# Patient Record
Sex: FEMALE | Race: WHITE | NOT HISPANIC OR LATINO | URBAN - METROPOLITAN AREA
[De-identification: names, ages, dates, MRNs, and addresses within clinical notes are randomized per-mention and may not be internally consistent; named-entity substitution may affect disease eponyms.]

---

## 2017-07-03 ENCOUNTER — EMERGENCY (EMERGENCY)
Facility: HOSPITAL | Age: 57
LOS: 0 days | Discharge: HOME | End: 2017-07-03
Attending: EMERGENCY MEDICINE
Payer: MEDICAID

## 2017-07-03 VITALS
RESPIRATION RATE: 18 BRPM | DIASTOLIC BLOOD PRESSURE: 86 MMHG | SYSTOLIC BLOOD PRESSURE: 136 MMHG | TEMPERATURE: 98 F | OXYGEN SATURATION: 99 % | HEART RATE: 90 BPM

## 2017-07-03 VITALS
WEIGHT: 162.92 LBS | OXYGEN SATURATION: 97 % | SYSTOLIC BLOOD PRESSURE: 131 MMHG | TEMPERATURE: 98 F | HEART RATE: 87 BPM | HEIGHT: 63 IN | RESPIRATION RATE: 16 BRPM

## 2017-07-03 PROCEDURE — 70486 CT MAXILLOFACIAL W/O DYE: CPT | Mod: 26

## 2017-07-03 PROCEDURE — 70450 CT HEAD/BRAIN W/O DYE: CPT | Mod: 26

## 2017-07-03 PROCEDURE — 99284 EMERGENCY DEPT VISIT MOD MDM: CPT

## 2017-07-03 PROCEDURE — 76377 3D RENDER W/INTRP POSTPROCES: CPT | Mod: 26

## 2017-07-03 NOTE — ED ADULT NURSE NOTE - OBJECTIVE STATEMENT
Pt states foot went into pothole causing her to fall. pt denies any dizziness or chest pain prior to falling. Pt denies any loc with fall.

## 2017-07-03 NOTE — ED PROVIDER NOTE - PHYSICAL EXAMINATION
GEN: Alert, NAD  HEAD: atraumatic, EOMI, PERRL, normal lids/conjunctiva  ENT: normal hearing, patent oropharynx without erythema/exudate, uvula midline  NECK: +supple, no tenderness/meningismus, +Trachea midline  PULM: Bilateral BS, normal resp effort, no wheeze/stridor/retractions  CV: RRR, no M/R/G, +dist ext pulses  ABD: soft, NT/ND  BACK: no CVAT, no midline tenderness  MSK: no edema/erythema/cyanosis  SKIN: no rash, abrasions R forehead and L forearm/elbow  NEURO: AAOx3, no sensory/motor deficits, CN 2-12 intact
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
## 2017-07-04 DIAGNOSIS — S09.90XA UNSPECIFIED INJURY OF HEAD, INITIAL ENCOUNTER: ICD-10-CM
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
## 2018-08-08 NOTE — ED PROVIDER NOTE - PRESENTING SYMPTOMS DETAILS
56F w madina aneurysm s/p clip comes in a a head inj after tripping on the sidewalk. hit her forehead, no loc, has abrasions on the forehead and L arm, no neckpain/weakness/numbness  ROS: No fever/chills, No photophobia/eye pain/changes in vision, No ear pain/sore throat/dysphagia, No chest pain/palpitations, no SOB/cough/wheeze/stridor, No abdominal pain/N/V/D, no dysuria/frequency/discharge, No neck/back pain, no rash, no changes in neurological status/function.
Warm

## 2019-03-12 NOTE — ED ADULT TRIAGE NOTE - CCCP TRG CHIEF CMPLNT
Spoke to patient and relayed information to her from Lesly Cheng NP.    Patient stated that she is not breastfeeding.    At this time, she feels that she is on the fence regarding what direction to take in terms of scheduling a EGD and/or taking a PPI.    Patient stated that she would appreciate talking to Lesly Cheng to clarify if she has ulcers.\" If Lesly feels that I have ulcers, I will try the medication\".    Patient also stated:  \"If Lesly is concerned of something else or think there is something more serious, I will do the EGD.\"    Patient seems to be very interested in knowing if she has ulcers and to proceed from there.    Will forward to Lesly Cheng's office to call and advise.   head trauma

## 2019-04-24 ENCOUNTER — APPOINTMENT (OUTPATIENT)
Dept: PHYSICAL MEDICINE AND REHAB | Facility: CLINIC | Age: 59
End: 2019-04-24

## 2019-08-15 ENCOUNTER — APPOINTMENT (OUTPATIENT)
Dept: INTERNAL MEDICINE | Facility: CLINIC | Age: 59
End: 2019-08-15
Payer: MEDICARE

## 2019-08-15 VITALS
SYSTOLIC BLOOD PRESSURE: 110 MMHG | DIASTOLIC BLOOD PRESSURE: 78 MMHG | HEART RATE: 64 BPM | BODY MASS INDEX: 22.02 KG/M2 | HEIGHT: 66 IN | WEIGHT: 137 LBS

## 2019-08-15 DIAGNOSIS — G43.909 MIGRAINE, UNSPECIFIED, NOT INTRACTABLE, W/OUT STATUS MIGRAINOSUS: ICD-10-CM

## 2019-08-15 DIAGNOSIS — G89.29 DORSALGIA, UNSPECIFIED: ICD-10-CM

## 2019-08-15 DIAGNOSIS — J45.998 OTHER ASTHMA: ICD-10-CM

## 2019-08-15 DIAGNOSIS — M54.9 DORSALGIA, UNSPECIFIED: ICD-10-CM

## 2019-08-15 PROCEDURE — 99205 OFFICE O/P NEW HI 60 MIN: CPT

## 2019-08-15 RX ORDER — SUMATRIPTAN 20 MG/1
20 SPRAY NASAL
Refills: 0 | Status: ACTIVE | COMMUNITY

## 2019-08-15 RX ORDER — TRAZODONE HYDROCHLORIDE 50 MG/1
50 TABLET ORAL
Refills: 0 | Status: ACTIVE | COMMUNITY

## 2019-08-15 RX ORDER — OXYCODONE HYDROCHLORIDE 30 MG/1
30 TABLET ORAL
Refills: 0 | Status: ACTIVE | COMMUNITY

## 2019-08-15 RX ORDER — CLONAZEPAM 1 MG/1
1 TABLET ORAL
Refills: 0 | Status: ACTIVE | COMMUNITY

## 2019-08-15 RX ORDER — TIZANIDINE HYDROCHLORIDE 4 MG/1
4 CAPSULE ORAL
Refills: 0 | Status: ACTIVE | COMMUNITY

## 2019-08-15 RX ORDER — CELECOXIB 50 MG/1
CAPSULE ORAL
Refills: 0 | Status: ACTIVE | COMMUNITY

## 2019-08-15 RX ORDER — MONTELUKAST SODIUM 10 MG/1
10 TABLET, FILM COATED ORAL
Refills: 0 | Status: ACTIVE | COMMUNITY

## 2019-08-15 RX ORDER — LEVOTHYROXINE SODIUM 175 UG/1
175 TABLET ORAL
Refills: 0 | Status: ACTIVE | COMMUNITY

## 2019-08-15 NOTE — ASSESSMENT
[FreeTextEntry1] : stop Reglan, restart omeprazole 40mg \par  Gastroparesis likely due to her slew of meds. \par  however constipation is puzzling since it is new and her pain meds are not new. So is this a true change in bowel habits. She had no relief with relistor which should have worked if this was opiod induced constipation\par Colonoscopy full risk and benefits explained\par Upper endoscopy risk and benefits fully explained\par

## 2019-08-15 NOTE — HISTORY OF PRESENT ILLNESS
[FreeTextEntry1] : , PMD  Dr. Phipps Saw Dr. Wilson in March 2019. had problems with constipation. she has a h/o IBS with diarrhea, in past. h/o barretts. She had colonoscopy 7 years .  He put her on relistor, and reglan in march. she stopped relistor, but has continued 10mg of reglan\par  She had an EGD- 3/2019 that showed gastroparesis. . She has been on oxycodone for years . and now has had a true change in bowel movements\par  She has chronic back issues for may years . \par she has early satiety

## 2019-08-15 NOTE — PHYSICAL EXAM
[General Appearance - Alert] : alert [General Appearance - In No Acute Distress] : in no acute distress [PERRL With Normal Accommodation] : pupils were equal in size, round, and reactive to light [Sclera] : the sclera and conjunctiva were normal [Extraocular Movements] : extraocular movements were intact [Outer Ear] : the ears and nose were normal in appearance [Neck Appearance] : the appearance of the neck was normal [Oropharynx] : the oropharynx was normal [Neck Cervical Mass (___cm)] : no neck mass was observed [Jugular Venous Distention Increased] : there was no jugular-venous distention [Thyroid Diffuse Enlargement] : the thyroid was not enlarged [Thyroid Nodule] : there were no palpable thyroid nodules [Auscultation Breath Sounds / Voice Sounds] : lungs were clear to auscultation bilaterally [Heart Sounds] : normal S1 and S2 [Heart Rate And Rhythm] : heart rate was normal and rhythm regular [Heart Sounds Gallop] : no gallops [Murmurs] : no murmurs [Heart Sounds Pericardial Friction Rub] : no pericardial rub [Full Pulse] : the pedal pulses are present [Edema] : there was no peripheral edema [Bowel Sounds] : normal bowel sounds [Breast Appearance] : normal in appearance [Breast Palpation Mass] : no palpable masses [Abdomen Tenderness] : non-tender [Abdomen Soft] : soft [Abdomen Mass (___ Cm)] : no abdominal mass palpated [External Female Genitalia] : normal external genitalia [Cervical Lymph Nodes Enlarged Posterior Bilaterally] : posterior cervical [Urinary Bladder Findings] : the bladder was normal on palpation [Urethral Meatus] : normal urethra [Supraclavicular Lymph Nodes Enlarged Bilaterally] : supraclavicular [Cervical Lymph Nodes Enlarged Anterior Bilaterally] : anterior cervical [Femoral Lymph Nodes Enlarged Bilaterally] : femoral [Axillary Lymph Nodes Enlarged Bilaterally] : axillary [No CVA Tenderness] : no ~M costovertebral angle tenderness [Inguinal Lymph Nodes Enlarged Bilaterally] : inguinal [No Spinal Tenderness] : no spinal tenderness [Nail Clubbing] : no clubbing  or cyanosis of the fingernails [Abnormal Walk] : normal gait [Musculoskeletal - Swelling] : no joint swelling seen [Motor Tone] : muscle strength and tone were normal [Skin Color & Pigmentation] : normal skin color and pigmentation [] : no rash [Skin Turgor] : normal skin turgor [Deep Tendon Reflexes (DTR)] : deep tendon reflexes were 2+ and symmetric [No Focal Deficits] : no focal deficits [Sensation] : the sensory exam was normal to light touch and pinprick [Oriented To Time, Place, And Person] : oriented to person, place, and time [Impaired Insight] : insight and judgment were intact [Affect] : the affect was normal

## 2019-08-15 NOTE — REVIEW OF SYSTEMS
[As Noted in HPI] : as noted in HPI [Constipation] : constipation [Negative] : Endocrine [Heartburn] : no heartburn

## 2019-10-14 ENCOUNTER — RX CHANGE (OUTPATIENT)
Age: 59
End: 2019-10-14

## 2019-10-25 ENCOUNTER — APPOINTMENT (OUTPATIENT)
Dept: INTERNAL MEDICINE | Facility: AMBULATORY MEDICAL SERVICES | Age: 59
End: 2019-10-25
Payer: MEDICARE

## 2019-10-25 PROCEDURE — G0121 COLON CA SCRN NOT HI RSK IND: CPT

## 2019-10-25 PROCEDURE — 43239 EGD BIOPSY SINGLE/MULTIPLE: CPT | Mod: 59

## 2019-11-25 ENCOUNTER — APPOINTMENT (OUTPATIENT)
Dept: INTERNAL MEDICINE | Facility: CLINIC | Age: 59
End: 2019-11-25
Payer: MEDICARE

## 2019-11-25 VITALS — DIASTOLIC BLOOD PRESSURE: 69 MMHG | SYSTOLIC BLOOD PRESSURE: 106 MMHG | HEART RATE: 69 BPM

## 2019-11-25 DIAGNOSIS — K21.9 GASTRO-ESOPHAGEAL REFLUX DISEASE W/OUT ESOPHAGITIS: ICD-10-CM

## 2019-11-25 DIAGNOSIS — T40.2X5A DRUG INDUCED CONSTIPATION: ICD-10-CM

## 2019-11-25 DIAGNOSIS — R19.4 CHANGE IN BOWEL HABIT: ICD-10-CM

## 2019-11-25 DIAGNOSIS — K59.03 DRUG INDUCED CONSTIPATION: ICD-10-CM

## 2019-11-25 DIAGNOSIS — K22.70 BARRETT'S ESOPHAGUS W/OUT DYSPLASIA: ICD-10-CM

## 2019-11-25 DIAGNOSIS — K58.1 IRRITABLE BOWEL SYNDROME WITH CONSTIPATION: ICD-10-CM

## 2019-11-25 PROCEDURE — 99214 OFFICE O/P EST MOD 30 MIN: CPT

## 2019-11-25 RX ORDER — METOCLOPRAMIDE HYDROCHLORIDE 5 MG/5ML
10 SOLUTION ORAL
Refills: 0 | Status: DISCONTINUED | COMMUNITY
End: 2019-11-25

## 2019-11-25 RX ORDER — SODIUM PICOSULFATE, MAGNESIUM OXIDE, AND ANHYDROUS CITRIC ACID 10; 3.5; 12 MG/160ML; G/160ML; G/160ML
10-3.5-12 MG-GM LIQUID ORAL TWICE DAILY
Qty: 1 | Refills: 0 | Status: DISCONTINUED | COMMUNITY
Start: 2019-10-14 | End: 2019-11-25

## 2019-11-25 RX ORDER — LORATADINE 5 MG
17 TABLET,CHEWABLE ORAL
Refills: 0 | Status: DISCONTINUED | COMMUNITY
End: 2019-11-25

## 2019-11-25 RX ORDER — OMEPRAZOLE 40 MG/1
40 CAPSULE, DELAYED RELEASE ORAL
Qty: 90 | Refills: 3 | Status: ACTIVE | COMMUNITY
Start: 1900-01-01 | End: 1900-01-01

## 2019-11-25 RX ORDER — POLYETHYLENE GLYCOL 3350, SODIUM SULFATE ANHYDROUS, SODIUM BICARBONATE, SODIUM CHLORIDE, POTASSIUM CHLORIDE 227.1; 21.5; 6.36; 5.53; .754 G/L; G/L; G/L; G/L; G/L
227.1 POWDER, FOR SOLUTION ORAL
Qty: 1 | Refills: 0 | Status: DISCONTINUED | COMMUNITY
Start: 2019-10-14 | End: 2019-11-25

## 2019-11-25 NOTE — PHYSICAL EXAM
[General Appearance - In No Acute Distress] : in no acute distress [General Appearance - Alert] : alert [Sclera] : the sclera and conjunctiva were normal [PERRL With Normal Accommodation] : pupils were equal in size, round, and reactive to light [Extraocular Movements] : extraocular movements were intact [Outer Ear] : the ears and nose were normal in appearance [Oropharynx] : the oropharynx was normal [Neck Cervical Mass (___cm)] : no neck mass was observed [Neck Appearance] : the appearance of the neck was normal [Jugular Venous Distention Increased] : there was no jugular-venous distention [Thyroid Diffuse Enlargement] : the thyroid was not enlarged [Thyroid Nodule] : there were no palpable thyroid nodules [Auscultation Breath Sounds / Voice Sounds] : lungs were clear to auscultation bilaterally [Heart Rate And Rhythm] : heart rate was normal and rhythm regular [Heart Sounds Gallop] : no gallops [Heart Sounds] : normal S1 and S2 [Murmurs] : no murmurs [Heart Sounds Pericardial Friction Rub] : no pericardial rub [Full Pulse] : the pedal pulses are present [Edema] : there was no peripheral edema [Breast Appearance] : normal in appearance [Bowel Sounds] : normal bowel sounds [Breast Palpation Mass] : no palpable masses [Abdomen Soft] : soft [] : no hepato-splenomegaly [Abdomen Mass (___ Cm)] : no abdominal mass palpated [Abdomen Tenderness] : non-tender [Urethral Meatus] : normal urethra [External Female Genitalia] : normal external genitalia [Urinary Bladder Findings] : the bladder was normal on palpation [No CVA Tenderness] : no ~M costovertebral angle tenderness [No Spinal Tenderness] : no spinal tenderness [Abnormal Walk] : normal gait [Musculoskeletal - Swelling] : no joint swelling seen [Nail Clubbing] : no clubbing  or cyanosis of the fingernails [Impaired Insight] : insight and judgment were intact [Oriented To Time, Place, And Person] : oriented to person, place, and time [Motor Tone] : muscle strength and tone were normal [Affect] : the affect was normal

## 2020-11-24 ENCOUNTER — APPOINTMENT (OUTPATIENT)
Dept: INTERNAL MEDICINE | Facility: CLINIC | Age: 60
End: 2020-11-24

## 2020-12-21 RX ORDER — NALOXEGOL OXALATE 25 MG/1
25 TABLET, FILM COATED ORAL
Qty: 30 | Refills: 0 | Status: ACTIVE | COMMUNITY
Start: 2019-10-25 | End: 1900-01-01

## 2021-04-21 NOTE — ED ADULT NURSE NOTE - CAS EDN INTEG ASSESS
[FreeTextEntry1] : 56 year old female here for follow up of possible scleroderma.\par \par 1. Possible limited systemic sclerosis\par \par -Raynaud's, positive centromere antibody, ?telangiectasias total 8 points (need 9 for definite systemic sclerosis classification)\par -Patient has GERD but episodic depending on what she eats\par -TTE done and normal\par -Overall doing well \par -Will prescribe topical nitroglycerin ointment for Raynaud's. Patient does not want to take PO mediation (amlodipine)\par \par 2. Positive anticardiolipin IgM 39\par \par -No history of VTE or pregnancy morbidity\par -Possible false positive will recheck today\par \par 3. Positive TAMMI\par \par -No symptoms of SLE, likely from SSc possibly. Check dsDNA, C3, C4, CBC, CMP\par \par 
- - -

## 2021-12-07 ENCOUNTER — INPATIENT (INPATIENT)
Facility: HOSPITAL | Age: 61
LOS: 1 days | Discharge: HOME | End: 2021-12-09
Attending: INTERNAL MEDICINE | Admitting: INTERNAL MEDICINE
Payer: MEDICARE

## 2021-12-07 VITALS
WEIGHT: 293 LBS | RESPIRATION RATE: 18 BRPM | DIASTOLIC BLOOD PRESSURE: 114 MMHG | HEART RATE: 62 BPM | TEMPERATURE: 99 F | OXYGEN SATURATION: 96 % | HEIGHT: 63 IN | SYSTOLIC BLOOD PRESSURE: 147 MMHG

## 2021-12-07 DIAGNOSIS — K21.9 GASTRO-ESOPHAGEAL REFLUX DISEASE WITHOUT ESOPHAGITIS: ICD-10-CM

## 2021-12-07 DIAGNOSIS — F31.9 BIPOLAR DISORDER, UNSPECIFIED: ICD-10-CM

## 2021-12-07 DIAGNOSIS — Z79.899 OTHER LONG TERM (CURRENT) DRUG THERAPY: ICD-10-CM

## 2021-12-07 DIAGNOSIS — R41.82 ALTERED MENTAL STATUS, UNSPECIFIED: ICD-10-CM

## 2021-12-07 DIAGNOSIS — E03.9 HYPOTHYROIDISM, UNSPECIFIED: ICD-10-CM

## 2021-12-07 DIAGNOSIS — M54.50 LOW BACK PAIN, UNSPECIFIED: ICD-10-CM

## 2021-12-07 DIAGNOSIS — R00.1 BRADYCARDIA, UNSPECIFIED: ICD-10-CM
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
## 2021-12-07 DIAGNOSIS — F06.30 MOOD DISORDER DUE TO KNOWN PHYSIOLOGICAL CONDITION, UNSPECIFIED: ICD-10-CM

## 2021-12-07 DIAGNOSIS — Z79.890 HORMONE REPLACEMENT THERAPY: ICD-10-CM

## 2021-12-07 DIAGNOSIS — R47.01 APHASIA: ICD-10-CM

## 2021-12-07 DIAGNOSIS — M54.9 DORSALGIA, UNSPECIFIED: ICD-10-CM

## 2021-12-07 DIAGNOSIS — G89.29 OTHER CHRONIC PAIN: ICD-10-CM

## 2021-12-07 DIAGNOSIS — I44.0 ATRIOVENTRICULAR BLOCK, FIRST DEGREE: ICD-10-CM

## 2021-12-07 DIAGNOSIS — T40.601A POISONING BY UNSPECIFIED NARCOTICS, ACCIDENTAL (UNINTENTIONAL), INITIAL ENCOUNTER: ICD-10-CM

## 2021-12-07 DIAGNOSIS — Z79.1 LONG TERM (CURRENT) USE OF NON-STEROIDAL ANTI-INFLAMMATORIES (NSAID): ICD-10-CM

## 2021-12-07 DIAGNOSIS — E78.5 HYPERLIPIDEMIA, UNSPECIFIED: ICD-10-CM

## 2021-12-07 DIAGNOSIS — G92.8 OTHER TOXIC ENCEPHALOPATHY: ICD-10-CM

## 2021-12-07 DIAGNOSIS — M54.10 RADICULOPATHY, SITE UNSPECIFIED: ICD-10-CM

## 2021-12-07 LAB
ALBUMIN SERPL ELPH-MCNC: 3.7 G/DL — SIGNIFICANT CHANGE UP (ref 3.3–5)
ALP SERPL-CCNC: 67 U/L — SIGNIFICANT CHANGE UP (ref 40–120)
ALT FLD-CCNC: 18 U/L — SIGNIFICANT CHANGE UP (ref 12–78)
AMMONIA BLD-MCNC: 17 UMOL/L — SIGNIFICANT CHANGE UP (ref 11–32)
AMPHET UR-MCNC: NEGATIVE — SIGNIFICANT CHANGE UP
ANION GAP SERPL CALC-SCNC: 8 MMOL/L — SIGNIFICANT CHANGE UP (ref 5–17)
APAP SERPL-MCNC: < 2 UG/ML (ref 10–30)
APTT BLD: 44.5 SEC — HIGH (ref 27.5–35.5)
AST SERPL-CCNC: 32 U/L — SIGNIFICANT CHANGE UP (ref 15–37)
BARBITURATES UR SCN-MCNC: NEGATIVE — SIGNIFICANT CHANGE UP
BASOPHILS # BLD AUTO: 0.04 K/UL — SIGNIFICANT CHANGE UP (ref 0–0.2)
BASOPHILS NFR BLD AUTO: 0.8 % — SIGNIFICANT CHANGE UP (ref 0–2)
BENZODIAZ UR-MCNC: POSITIVE — SIGNIFICANT CHANGE UP
BILIRUB SERPL-MCNC: 0.8 MG/DL — SIGNIFICANT CHANGE UP (ref 0.2–1.2)
BUN SERPL-MCNC: 17 MG/DL — SIGNIFICANT CHANGE UP (ref 7–23)
CALCIUM SERPL-MCNC: 8.7 MG/DL — SIGNIFICANT CHANGE UP (ref 8.5–10.1)
CHLORIDE SERPL-SCNC: 106 MMOL/L — SIGNIFICANT CHANGE UP (ref 96–108)
CO2 SERPL-SCNC: 23 MMOL/L — SIGNIFICANT CHANGE UP (ref 22–31)
COCAINE METAB.OTHER UR-MCNC: NEGATIVE — SIGNIFICANT CHANGE UP
CREAT SERPL-MCNC: 0.92 MG/DL — SIGNIFICANT CHANGE UP (ref 0.5–1.3)
EOSINOPHIL # BLD AUTO: 0.01 K/UL — SIGNIFICANT CHANGE UP (ref 0–0.5)
EOSINOPHIL NFR BLD AUTO: 0.2 % — SIGNIFICANT CHANGE UP (ref 0–6)
ETHANOL SERPL-MCNC: <10 MG/DL — SIGNIFICANT CHANGE UP (ref 0–10)
FLUAV AG NPH QL: SIGNIFICANT CHANGE UP
FLUBV AG NPH QL: SIGNIFICANT CHANGE UP
FOLATE SERPL-MCNC: 6.8 NG/ML — SIGNIFICANT CHANGE UP
GLUCOSE BLDC GLUCOMTR-MCNC: 88 MG/DL — SIGNIFICANT CHANGE UP (ref 70–99)
GLUCOSE SERPL-MCNC: 102 MG/DL — HIGH (ref 70–99)
HCT VFR BLD CALC: 39.5 % — SIGNIFICANT CHANGE UP (ref 34.5–45)
HGB BLD-MCNC: 13.3 G/DL — SIGNIFICANT CHANGE UP (ref 11.5–15.5)
IMM GRANULOCYTES NFR BLD AUTO: 0.4 % — SIGNIFICANT CHANGE UP (ref 0–1.5)
INR BLD: 1.07 RATIO — SIGNIFICANT CHANGE UP (ref 0.88–1.16)
LACTATE SERPL-SCNC: 1.2 MMOL/L — SIGNIFICANT CHANGE UP (ref 0.7–2)
LYMPHOCYTES # BLD AUTO: 1.05 K/UL — SIGNIFICANT CHANGE UP (ref 1–3.3)
LYMPHOCYTES # BLD AUTO: 22.2 % — SIGNIFICANT CHANGE UP (ref 13–44)
MCHC RBC-ENTMCNC: 30.7 PG — SIGNIFICANT CHANGE UP (ref 27–34)
MCHC RBC-ENTMCNC: 33.7 GM/DL — SIGNIFICANT CHANGE UP (ref 32–36)
MCV RBC AUTO: 91.2 FL — SIGNIFICANT CHANGE UP (ref 80–100)
METHADONE UR-MCNC: NEGATIVE — SIGNIFICANT CHANGE UP
MONOCYTES # BLD AUTO: 0.29 K/UL — SIGNIFICANT CHANGE UP (ref 0–0.9)
MONOCYTES NFR BLD AUTO: 6.1 % — SIGNIFICANT CHANGE UP (ref 2–14)
NEUTROPHILS # BLD AUTO: 3.31 K/UL — SIGNIFICANT CHANGE UP (ref 1.8–7.4)
NEUTROPHILS NFR BLD AUTO: 70.3 % — SIGNIFICANT CHANGE UP (ref 43–77)
NRBC # BLD: 0 /100 WBCS — SIGNIFICANT CHANGE UP (ref 0–0)
OPIATES UR-MCNC: NEGATIVE — SIGNIFICANT CHANGE UP
PCP SPEC-MCNC: SIGNIFICANT CHANGE UP
PCP UR-MCNC: NEGATIVE — SIGNIFICANT CHANGE UP
PLATELET # BLD AUTO: 185 K/UL — SIGNIFICANT CHANGE UP (ref 150–400)
POTASSIUM SERPL-MCNC: 4.5 MMOL/L — SIGNIFICANT CHANGE UP (ref 3.5–5.3)
POTASSIUM SERPL-SCNC: 4.5 MMOL/L — SIGNIFICANT CHANGE UP (ref 3.5–5.3)
PROT SERPL-MCNC: 7.4 GM/DL — SIGNIFICANT CHANGE UP (ref 6–8.3)
PROTHROM AB SERPL-ACNC: 12.4 SEC — SIGNIFICANT CHANGE UP (ref 10.6–13.6)
RBC # BLD: 4.33 M/UL — SIGNIFICANT CHANGE UP (ref 3.8–5.2)
RBC # FLD: 12.4 % — SIGNIFICANT CHANGE UP (ref 10.3–14.5)
SALICYLATES SERPL-MCNC: <1.7 MG/DL — LOW (ref 2.8–20)
SARS-COV-2 RNA SPEC QL NAA+PROBE: SIGNIFICANT CHANGE UP
SODIUM SERPL-SCNC: 137 MMOL/L — SIGNIFICANT CHANGE UP (ref 135–145)
T PALLIDUM AB TITR SER: NEGATIVE — SIGNIFICANT CHANGE UP
T3 SERPL-MCNC: 61 NG/DL — LOW (ref 80–200)
T4 FREE SERPL-MCNC: 1.3 NG/DL — SIGNIFICANT CHANGE UP (ref 0.9–1.8)
THC UR QL: POSITIVE — SIGNIFICANT CHANGE UP
TROPONIN I, HIGH SENSITIVITY RESULT: 6.8 NG/L — SIGNIFICANT CHANGE UP
TSH SERPL-MCNC: 8.39 UU/ML — HIGH (ref 0.36–3.74)
VIT B12 SERPL-MCNC: 466 PG/ML — SIGNIFICANT CHANGE UP (ref 232–1245)
WBC # BLD: 4.72 K/UL — SIGNIFICANT CHANGE UP (ref 3.8–10.5)
WBC # FLD AUTO: 4.72 K/UL — SIGNIFICANT CHANGE UP (ref 3.8–10.5)

## 2021-12-07 PROCEDURE — 0042T: CPT

## 2021-12-07 PROCEDURE — 95816 EEG AWAKE AND DROWSY: CPT | Mod: 26

## 2021-12-07 PROCEDURE — 99232 SBSQ HOSP IP/OBS MODERATE 35: CPT

## 2021-12-07 PROCEDURE — 70496 CT ANGIOGRAPHY HEAD: CPT | Mod: 26,MA

## 2021-12-07 PROCEDURE — 90792 PSYCH DIAG EVAL W/MED SRVCS: CPT

## 2021-12-07 PROCEDURE — 99285 EMERGENCY DEPT VISIT HI MDM: CPT

## 2021-12-07 PROCEDURE — 99223 1ST HOSP IP/OBS HIGH 75: CPT

## 2021-12-07 PROCEDURE — 93010 ELECTROCARDIOGRAM REPORT: CPT

## 2021-12-07 PROCEDURE — 70498 CT ANGIOGRAPHY NECK: CPT | Mod: 26,MA

## 2021-12-07 RX ORDER — DEXTROSE 50 % IN WATER 50 %
25 SYRINGE (ML) INTRAVENOUS ONCE
Refills: 0 | Status: DISCONTINUED | OUTPATIENT
Start: 2021-12-07 | End: 2021-12-09

## 2021-12-07 RX ORDER — ENOXAPARIN SODIUM 100 MG/ML
40 INJECTION SUBCUTANEOUS DAILY
Refills: 0 | Status: DISCONTINUED | OUTPATIENT
Start: 2021-12-07 | End: 2021-12-09

## 2021-12-07 RX ORDER — ETOMIDATE 2 MG/ML
5 INJECTION INTRAVENOUS ONCE
Refills: 0 | Status: DISCONTINUED | OUTPATIENT
Start: 2021-12-07 | End: 2021-12-07

## 2021-12-07 RX ORDER — LANOLIN ALCOHOL/MO/W.PET/CERES
3 CREAM (GRAM) TOPICAL AT BEDTIME
Refills: 0 | Status: DISCONTINUED | OUTPATIENT
Start: 2021-12-07 | End: 2021-12-09

## 2021-12-07 RX ORDER — ETOMIDATE 2 MG/ML
10 INJECTION INTRAVENOUS ONCE
Refills: 0 | Status: COMPLETED | OUTPATIENT
Start: 2021-12-07 | End: 2021-12-07

## 2021-12-07 RX ORDER — ATORVASTATIN CALCIUM 80 MG/1
40 TABLET, FILM COATED ORAL AT BEDTIME
Refills: 0 | Status: DISCONTINUED | OUTPATIENT
Start: 2021-12-07 | End: 2021-12-09

## 2021-12-07 RX ORDER — PANTOPRAZOLE SODIUM 20 MG/1
40 TABLET, DELAYED RELEASE ORAL
Refills: 0 | Status: DISCONTINUED | OUTPATIENT
Start: 2021-12-07 | End: 2021-12-09

## 2021-12-07 RX ORDER — DEXTROSE 50 % IN WATER 50 %
12.5 SYRINGE (ML) INTRAVENOUS ONCE
Refills: 0 | Status: DISCONTINUED | OUTPATIENT
Start: 2021-12-07 | End: 2021-12-09

## 2021-12-07 RX ORDER — MIDAZOLAM HYDROCHLORIDE 1 MG/ML
2 INJECTION, SOLUTION INTRAMUSCULAR; INTRAVENOUS ONCE
Refills: 0 | Status: DISCONTINUED | OUTPATIENT
Start: 2021-12-07 | End: 2021-12-07

## 2021-12-07 RX ORDER — HALOPERIDOL DECANOATE 100 MG/ML
5 INJECTION INTRAMUSCULAR ONCE
Refills: 0 | Status: COMPLETED | OUTPATIENT
Start: 2021-12-07 | End: 2021-12-07

## 2021-12-07 RX ORDER — OXYCODONE HYDROCHLORIDE 5 MG/1
30 TABLET ORAL EVERY 6 HOURS
Refills: 0 | Status: DISCONTINUED | OUTPATIENT
Start: 2021-12-07 | End: 2021-12-09

## 2021-12-07 RX ORDER — ASPIRIN/CALCIUM CARB/MAGNESIUM 324 MG
81 TABLET ORAL DAILY
Refills: 0 | Status: DISCONTINUED | OUTPATIENT
Start: 2021-12-07 | End: 2021-12-09

## 2021-12-07 RX ORDER — ETOMIDATE 2 MG/ML
12.5 INJECTION INTRAVENOUS ONCE
Refills: 0 | Status: COMPLETED | OUTPATIENT
Start: 2021-12-07 | End: 2021-12-07

## 2021-12-07 RX ORDER — DEXTROSE 50 % IN WATER 50 %
15 SYRINGE (ML) INTRAVENOUS ONCE
Refills: 0 | Status: DISCONTINUED | OUTPATIENT
Start: 2021-12-07 | End: 2021-12-09

## 2021-12-07 RX ORDER — MIDAZOLAM HYDROCHLORIDE 1 MG/ML
1 INJECTION, SOLUTION INTRAMUSCULAR; INTRAVENOUS ONCE
Refills: 0 | Status: DISCONTINUED | OUTPATIENT
Start: 2021-12-07 | End: 2021-12-07

## 2021-12-07 RX ORDER — GLUCAGON INJECTION, SOLUTION 0.5 MG/.1ML
1 INJECTION, SOLUTION SUBCUTANEOUS ONCE
Refills: 0 | Status: DISCONTINUED | OUTPATIENT
Start: 2021-12-07 | End: 2021-12-09

## 2021-12-07 RX ORDER — ONDANSETRON 8 MG/1
4 TABLET, FILM COATED ORAL EVERY 8 HOURS
Refills: 0 | Status: DISCONTINUED | OUTPATIENT
Start: 2021-12-07 | End: 2021-12-09

## 2021-12-07 RX ORDER — LEVOTHYROXINE SODIUM 125 MCG
75 TABLET ORAL DAILY
Refills: 0 | Status: DISCONTINUED | OUTPATIENT
Start: 2021-12-07 | End: 2021-12-08

## 2021-12-07 RX ADMIN — MIDAZOLAM HYDROCHLORIDE 2 MILLIGRAM(S): 1 INJECTION, SOLUTION INTRAMUSCULAR; INTRAVENOUS at 05:37

## 2021-12-07 RX ADMIN — HALOPERIDOL DECANOATE 5 MILLIGRAM(S): 100 INJECTION INTRAMUSCULAR at 06:57

## 2021-12-07 RX ADMIN — MIDAZOLAM HYDROCHLORIDE 2 MILLIGRAM(S): 1 INJECTION, SOLUTION INTRAMUSCULAR; INTRAVENOUS at 05:44

## 2021-12-07 RX ADMIN — ATORVASTATIN CALCIUM 40 MILLIGRAM(S): 80 TABLET, FILM COATED ORAL at 22:15

## 2021-12-07 RX ADMIN — Medication 81 MILLIGRAM(S): at 13:23

## 2021-12-07 RX ADMIN — ETOMIDATE 10 MILLIGRAM(S): 2 INJECTION INTRAVENOUS at 05:55

## 2021-12-07 RX ADMIN — OXYCODONE HYDROCHLORIDE 30 MILLIGRAM(S): 5 TABLET ORAL at 23:16

## 2021-12-07 RX ADMIN — ETOMIDATE 12.5 MILLIGRAM(S): 2 INJECTION INTRAVENOUS at 06:00

## 2021-12-07 RX ADMIN — ENOXAPARIN SODIUM 40 MILLIGRAM(S): 100 INJECTION SUBCUTANEOUS at 13:23

## 2021-12-07 NOTE — ED ADULT NURSE REASSESSMENT NOTE - NS ED NURSE REASSESS COMMENT FT1
Patient continues to be agitated, continues to kick at staff. MD Foster at bedside. No acute distress noted, patient continues to be on cardiac monitor.

## 2021-12-07 NOTE — PHYSICAL THERAPY INITIAL EVALUATION ADULT - TRANSFER TRAINING, PT EVAL
To be able to perform transfers Independently, including bed to chair, chair to bed and chair to chair In order to facilitate safety with appropriate technique in 2 weeks

## 2021-12-07 NOTE — SWALLOW BEDSIDE ASSESSMENT ADULT - SLP GENERAL OBSERVATIONS
alert and oriented to name and place; maintained awareness of eating/swallow context; followed directions; demonstrates expressive language deficits impacting communication over 50% of time

## 2021-12-07 NOTE — BH CONSULTATION LIAISON ASSESSMENT NOTE - NSBHCONSULTFOLLOWAFTERCARE_PSY_A_CORE FT
has follow up with Dr Gillespie for psychiatric medication management; scheduled for spinal fusion surgery with Dr Larkin here at [facility] January 2022.

## 2021-12-07 NOTE — BH CONSULTATION LIAISON ASSESSMENT NOTE - NSBHCHARTREVIEWLAB_PSY_A_CORE FT
12-07    137  |  106  |  17  ----------------------------<  102<H>  4.5   |  23  |  0.92    Ca    8.7      07 Dec 2021 06:27    TPro  7.4  /  Alb  3.7  /  TBili  0.8  /  DBili  x   /  AST  32  /  ALT  18  /  AlkPhos  67  12-07

## 2021-12-07 NOTE — PHYSICAL THERAPY INITIAL EVALUATION ADULT - ADDITIONAL COMMENTS
as per patient she lives with a friend in a  with 5 steps to enter with 2 HR's (widely spread). inside she has her own room. pt was independent, owns a cane but she does not use it.

## 2021-12-07 NOTE — PHYSICAL THERAPY INITIAL EVALUATION ADULT - DID THE PATIENT HAVE SURGERY?
This is the hx of CONCEPCION. a 60 y/o female patient who was admitted [place] [facility] due to complications of CVA affecting medical condition and with subsequent affection on functional mobility./n/a

## 2021-12-07 NOTE — BH CONSULTATION LIAISON ASSESSMENT NOTE - CURRENT MEDICATION
MEDICATIONS  (STANDING):  aspirin  chewable 81 milliGRAM(s) Oral daily  atorvastatin 40 milliGRAM(s) Oral at bedtime  dextrose 40% Gel 15 Gram(s) Oral once  dextrose 50% Injectable 25 Gram(s) IV Push once  dextrose 50% Injectable 12.5 Gram(s) IV Push once  dextrose 50% Injectable 25 Gram(s) IV Push once  enoxaparin Injectable 40 milliGRAM(s) SubCutaneous daily  glucagon  Injectable 1 milliGRAM(s) IntraMuscular once  levothyroxine 75 MICROGram(s) Oral daily  pantoprazole    Tablet 40 milliGRAM(s) Oral before breakfast    MEDICATIONS  (PRN):  melatonin 3 milliGRAM(s) Oral at bedtime PRN Insomnia  ondansetron Injectable 4 milliGRAM(s) IV Push every 8 hours PRN Nausea and/or Vomiting

## 2021-12-07 NOTE — SWALLOW BEDSIDE ASSESSMENT ADULT - COMMENTS
PMH hypothyroidism, bipolar disorder, chronic low back pain (scheduled for surgery  with Dr Larkin in Jan/2022), h/o brain aneurysm ( s/p clipping in her 20s), GERD;   12/7  note Depressive disorder due to separate medical condition pt denied any swallowing difficulties PMH hypothyroidism, bipolar disorder, chronic low back pain (scheduled for surgery  with Dr Larkin in Jan/2022), h/o brain aneurysm ( s/p clipping in her 20s), GERD;   12/7  note Depressive disorder due to separate medical condition  12/7 CT brain Motion limited study. No gross evidence of acute intracranial hemorrhage, midline shift or CT evidence of acute territorial infarct.

## 2021-12-07 NOTE — PHYSICAL THERAPY INITIAL EVALUATION ADULT - GENERAL OBSERVATIONS, REHAB EVAL
Pt was encountered semi-supine in bed; NAD, + telemetry +, primafit +, AXOX4, coherent, comprehension intact, cooperative, c/o pain on back 8/10.

## 2021-12-07 NOTE — PHYSICAL THERAPY INITIAL EVALUATION ADULT - GAIT TRAINING, PT EVAL
To be able to perform ambulation independently using no device for 500 feet, using proper technique using AD, with proper posture and functional distance at home in 2 weeks.

## 2021-12-07 NOTE — ED ADULT NURSE REASSESSMENT NOTE - NS ED NURSE REASSESS COMMENT FT1
Received pt this morning sleeping and arousable to touch- Friend at the bedside stated that she lives next door to the pt and she heard banging on the bathroom wall- she went to check on her and saw that the pt was on the floor and saying "no, no" pt was unable to verbalize needs at the time-EMS was called and pt came in for Stroke work up. Pt medicated for purpose of getting EKG and CT-Scan completed during the prior shift. Pt in no acute distress-pending admission.

## 2021-12-07 NOTE — SPEECH LANGUAGE PATHOLOGY EVALUATION - SLP GENERAL OBSERVATIONS
alert and oriented to name and place; maintained interactions and cooperative for exam; followed directions and demonstrated impaired verbal communication

## 2021-12-07 NOTE — PHYSICAL THERAPY INITIAL EVALUATION ADULT - BALANCE TRAINING, PT EVAL
Pt will improve static/dynamic standing balance to WFL to perform all functional mobility without LOB and increase safety, by 2 weeks

## 2021-12-07 NOTE — BH CONSULTATION LIAISON ASSESSMENT NOTE - SUMMARY
61 female with hx of Mood Disorder, suspected Borderline character traits, hx of SIB, 1 prior psychiatric admission several years ago for cutting with concurrent passive suicidal ideation; no hx of high lethality attempts; currently seeing psychiatrist Dr Gillespie for monthly medication management (trazodone and Cymbalta), denies substance abuse, reports to be medication compliant, has been psychiatrically stable for years); + with Chronic Pain not optimally managed & not seeing a Pain Management Specialist (PMD RX Oxycodone, Xanaflex), scheduled for Spine surgery (fusion) with Dr Larkin 01/22 at , admitted with presenting for AMS preceded by days of increased chronic back pain resulting in inability to get out of bed / ambulate. History and collateral most consistent with unintentional overuse of analgesic in order to combat worsened pain resulting in AMS. Patient still has residual memory/recall deficits on 12/7/21 which should clear by 12/8/21

## 2021-12-07 NOTE — ED PROVIDER NOTE - PROGRESS NOTE DETAILS
patinet altered in CT preventing time sensitive medical workup, sequential versed doses given without imrpvoement. etomidate given with good success. airway status monitored throughout with complication. patinet now awake and moving all extremities.

## 2021-12-07 NOTE — H&P ADULT - HISTORY OF PRESENT ILLNESS
Talked to Holly Mcadams ( [phone]), patient's neighbor for 50 years, who know patient medical history very well.  61 years old female with h/o hypothyroidism, bipolar disorder, chronic low back pain (scheduled for surgery  with Dr Larkin in Jan/2022), h/o brain aneurysm ( s/p clipping in her 20s), GERD present to ED with AMS. Last known normal was last night 9:30 PM. Today at around 4:30AM, neighbor heard banging noise coming from patient's apartment. Patient was seen banging the radiatory cover and appear altered. The only thing patient would response to the neighbor was " no...no" and did not recognizer her. Per neighbor, patient was admitted to a psychiatric hospital several years ago for cutting herself, has since then stable and is following with psych outpatient.   Was altered in ED. Received benzo, etomidate and haldol in ED for CT scan. EKG with juncitional marcelino, VR 49, QTc 431. No leukocytosis, Hb 13.3, Plt 185, Na 137, K 4.5, Cr 0.92, glucose 102, lactate 1.2, ammonia 17, hsTnT 6.8, ETOH < 10. CT head with motion limited study but no gross evidence of acute intracranial hemorrhage or infarct. CTA head/neck with patent intracranial circulation. Right supraclinoid aneurysm clip without evidence of recannulization. No intracranial aneurysm. Patent, ECAs, ICAs, no  hemodynamically significant stenosis at  ICA origins by NASCET criteria. Bilateral vertebral arteries are patent without flow limiting stenosisCT perfusion show areas of elevated Tmax do not follow a vascular distribution and are likely artifactual in nature. ED consulted neurology    SH: drink a glass of wine occasionally  FH: Mother has breast cancer

## 2021-12-07 NOTE — BH CONSULTATION LIAISON ASSESSMENT NOTE - NSBHCONSULTRECOMMENDOTHER_PSY_A_CORE FT
HOLD all psychiatric medications for today; Patient needs a "wash out"; MSE should be back to baseline by tomorrow if she continues this trajectory of improvement  - drank juice today with no issues as per RN; speech normal; DIET changed to full liquid / advance as tolerated

## 2021-12-07 NOTE — SWALLOW BEDSIDE ASSESSMENT ADULT - SWALLOW EVAL: RECOMMENDED FEEDING/EATING TECHNIQUES
allow for swallow between intakes/alternate food with liquid/maintain upright posture during/after eating for 30 mins/oral hygiene/small sips/bites

## 2021-12-07 NOTE — PHYSICAL THERAPY INITIAL EVALUATION ADULT - PLANNED THERAPY INTERVENTIONS, PT EVAL
To be able to perform stair negotiation with no device and R hand rails Independently to improve access and exiting from home and access to bedrooms, basement and bathrooms by 2 weeks/balance training/gait training/strengthening/transfer training

## 2021-12-07 NOTE — H&P ADULT - NSHPPHYSICALEXAM_GEN_ALL_CORE
CONSTITUTIONAL: Well developed, altered and sedated, no acute distress. BP-135/80 , HR- 45 , RR- 16  EYES: PERRL, no scleral icterus  NECK: Neck supple, trachea midline, non-tender, no masses or thyromegaly.  CARDIAC: Normal S1 and S2. Regular rate and rhythms. No murmurs. No Pedal edema. Peripheral pulses intact  LUNGS: Clear to auscultation, equal air entry both lungs. No rales, rhonchi, wheezing. Normal respiratory effort.   ABDOMEN: Soft, nondistended, nontender. No guarding or rebound tenderness. No hepatomegaly or splenomegaly.   MUSCULOSKELETAL: Normocephalic, atraumatic. No clubbing or cyanosis. No significant deformity or joint abnormality  NEUROLOGICAL: AMS, sedated and unable to reliable assess but seems to move all extremities  SKIN: no lesions or eruptions. Normal turgor  PSYCHIATRIC: A&O x 0, altered and sedated

## 2021-12-07 NOTE — PHYSICAL THERAPY INITIAL EVALUATION ADULT - ASR EQUIP NEEDS DISCH PT EVAL
Mint Green, Lavender, Blue, Yellow/Orange blood tubes collected and sent to lab.    Speckled top and Grey top urine tubes collected and sent to lab       DARREN Shipley  12/29/20 3950 Pt owns a cane

## 2021-12-07 NOTE — BH CONSULTATION LIAISON ASSESSMENT NOTE - HPI (INCLUDE ILLNESS QUALITY, SEVERITY, DURATION, TIMING, CONTEXT, MODIFYING FACTORS, ASSOCIATED SIGNS AND SYMPTOMS)
61f pmhx brain aneurysm s/p clip. bipolar disorder, chronic back pain on oxycodone and zanaflex presenting for AMS. last seen normal by her neighbor at 9:30 PM. shortly prior to ED arrival the neighbor heard banging coming from the patient's apartment, when she walked over she saw the patient banging the radiatory cover, the only thing the patinet would respond to the neighbor was "no, no" and did not recognize her. juana was admitted to a psychiatric hospital several years ago for cutting, per the neighbot at bedside the patient has been psychaitrically stable since. juana denies any pain in ED, though is confused.    [facility] no record of patient  ISTOP Reference #: [id number]  11/05/2021	11/10/2021	clonazepam 1 mg tablet	90	30	Teodoro Phipps MD	[id number]	Medicare	[id number]  11/05/2021	11/10/2021	oxycodone hcl 30 mg tablet	120	30	Teodoro Phipps MD	[id number]	Medicare	[facility]   10/11/2021	10/12/2021	oxycodone hcl 30 mg tablet	120	30	Teodoro Phipps MD	[id number]	Medicare	[facility]   10/11/2021	10/12/2021	clonazepam 1 mg tablet	90	30	Teodoro Phipps MD	[id number]	Medicare	[id number]  09/07/2021	09/13/2021	oxycodone hcl 30 mg tablet	120	30	Teodoro Phipps MD	[id number]	Medicare	[facility]   61f pmhx brain aneurysm s/p clip. bipolar disorder, chronic back pain on oxycodone and zanaflex presenting for AMS. last seen normal by her neighbor at 9:30 PM. shortly prior to ED arrival the neighbor heard banging coming from the patient's apartment, when she walked over she saw the patient banging the radiatory cover, the only thing the Patient would respond to the neighbor was "no, no" and did not recognize her. Pt was admitted to a psychiatric hospital several years ago for cutting (no hx found on PSYCKES), per the neighbor at bedside the patient has been psychiatrically stable since. CT Angio unremarkable - no acute pathology;   Right supraclinoid aneurysm clip without evidence of recannulization    < end of copied text >        [facility] no record of patient  ISTOP Reference #: [id number]  11/05/2021	11/10/2021	clonazepam 1 mg tablet	90	30	Teodoro Phipps MD	[id number]	Medicare	[id number]  11/05/2021	11/10/2021	oxycodone hcl 30 mg tablet	120	30	[facility]	[id number]	[facility]   10/11/2021	10/12/2021	oxycodone hcl 30 mg tablet	120	30	Teodoro Phipps MD	[id number]	Medicare	[facility]   10/11/2021	10/12/2021	clonazepam 1 mg tablet	90	30	Teodoro Phipps MD	[id number]	Medicare	[id number]  09/07/2021	09/13/2021	oxycodone hcl 30 mg tablet	120	30	Teodroo Phipps MD	[id number]	Medicare	[facility]   61  female, with hx of Mood Disorder, suspected Borderline character traits, hx of SIB, admitted to a psychiatric hospital several years ago for cutting with concurrent passive suicidal ideation (no hx found on PSYCKES), currently seeing psychiatrist Dr Gillespie for monthly medication management (trazodone and Cymbalta), denies substance abuse, reports to be medication compliant, has been psychiatrically stable for years, + hx of brain aneurysm s/p clip, chronic back pain on oxycodone and zanaflex, scheduled for Spine surgery (fusion) with Dr Larkin 01/22 at , admitted with presenting for AMS. last seen normal by her roommate/best friend at 9:30 PM. shortly prior to ED arrival the neighbor heard banging coming from the patient's apartment, when she walked over she saw the patient banging the radiatory cover, the only thing the Patient would respond to the neighbor was "no, no" and did not recognize her. Pt was , per the neighbor at bedside the patient has been psychiatrically stable since. CT Angio unremarkable - no acute pathology; Right supraclinoid aneurysm clip without evidence of recannulization    EXAM: calm, cooperative, polite, + impaired recall, memory and often says "why can't I remember?" Denies suicidal ideation, intent or plan. Remembers the EMS ride but not events preceding admission. Reports she has been functioning at baseline prior to this and psychiatrically stable. Denies suicidal ideation, inent or plan. Reports chronic baseline depressive sxs directly tied to chronic pain and resulting debility. Best friend / roommate Holly at bedside reports that patient has had increased back pain in the last week or so with the weather fluctuations and often could not even get out of bed due to the pain. Friend suspected patient likely took extra pain medication because of the worsened pain and hence resulting in confusion. Best friend also is Pt's roommate and denies any changes in recent behavior, no psych sxs, and has no acute safety concerns. Offered to be in charge of dispensing Pt's medications once back at home. NO substance use in the home; no access to guns. Best friend does not think this was an intentional overdose or intentional overuse to get high.     [facility]M no record of patient  ISTOP Reference #: [id number]  11/05/2021	11/10/2021	clonazepam 1 mg tablet	90	30	Teodoro Phipps MD	[id number]	Medicare	[id number]  11/05/2021	11/10/2021	oxycodone hcl 30 mg tablet	120	30	Teodoro Phipps MD	[id number]	Medicare	[facility]   10/11/2021	10/12/2021	oxycodone hcl 30 mg tablet	120	30	Teodoro Phipps MD	[id number]	Medicare	[facility]   10/11/2021	10/12/2021	clonazepam 1 mg tablet	90	30	Teodoro Phipps MD	[id number]	Medicare	[id number]  09/07/2021	09/13/2021	oxycodone hcl 30 mg tablet	120	30	Teodoro Phipps MD	[id number]	Medicare	[facility]

## 2021-12-07 NOTE — CONSULT NOTE ADULT - ASSESSMENT
62yo lady with a PMH of hypothyroidism, bipolar disorder, chronic low back pain (scheduled for surgery  with Dr Larkin in Jan/2022), h/o brain aneurysm ( s/p clipping in her 20s), GERD present to ED with AMS. Last known normal was last night 9:30 PM. Today at around 4:30AM, neighbor heard banging noise coming from patient's apartment. Patient was seen banging the radiatory cover and appear altered. The only thing patient would response to the neighbor was " no...no" and did not recognizer her. Per neighbor, patient was admitted to a psychiatric hospital several years ago for cutting herself, has since then stable and is following with psych outpatient.     In the ED, he received benzo, etomidate and haldol for CT scan.   ECG:  sinus marcelino 49bpm; long 1st degree AVB  Labs otherwise unremarkable.  CT head with motion limited study but no gross evidence of acute intracranial hemorrhage or infarct. CTA head/neck with patent intracranial circulation. Right supraclinoid aneurysm clip without evidence of recannulization. No intracranial aneurysm. Patent, ECAs, ICAs, no  hemodynamically significant stenosis at  ICA origins by NASCET criteria. Bilateral vertebral arteries are patent without flow limiting stenosisCT perfusion show areas of elevated Tmax do not follow a vascular distribution and are likely artifactual in nature.    Pt currently oriented to person; confused about location/time but aware she is making mistakes and laughing about it.    -would obtain MRI brain if ok w/ prior clips  -cont asa/statin  -monitor on tele  -check TSH  -avoid all AV gonzales blocking agents  -2D echo  -appears to have had a CVA... can often see bradyarrhythmias in the acute setting, cassandra after sedation as above; will follow

## 2021-12-07 NOTE — BH CONSULTATION LIAISON ASSESSMENT NOTE - MSE UNSTRUCTURED FT
calm, cooperative, polite, + impaired recall, memory and often says "why can't I remember?" Denies suicidal ideation, intent or plan. Remembers the EMS ride but not events preceding admission.

## 2021-12-07 NOTE — H&P ADULT - PROBLEM SELECTOR PLAN 2
ED noted some degree of aphasia  Currently sedated and unable to assess  CT head with motion limited study but no gross evidence of acute intracranial hemorrhage or infarct.   CTA head/neck with patent intracranial circulation. Right supraclinoid aneurysm clip without evidence of recannulization. No intracranial aneurysm. Patent, ECAs, ICAs, no  hemodynamically significant stenosis at  ICA origins by NASCET criteria. Bilateral vertebral arteries are patent without flow limiting stenosis  CT perfusion show areas of elevated Tmax do not follow a vascular distribution and are likely artifactual in nature  Consider repeat CT head after 24 hours as aneurysm clip is not MRI compatible per neighbor  Neurology consulted  Neuro check, aspiration/fall/seizure precaution  EEG

## 2021-12-07 NOTE — H&P ADULT - PROBLEM SELECTOR PLAN 1
present with AMS, last known normal was 9:30 PM 12/06/21  Was found banging the radiator cover with her hands and dose not recognize her neighbor of 50 years  Per neighbor, was say " no" , moving all extremities and was able to walk  On high dose of oxycodone 30mg IR q6hr prn for pain, on Trazodone, clonazepam, tizanidine, baclofen as well  Received IV sedation for CT scan in ED  Still altered, but protecting airway   CT head with motion limited study but no gross evidence of acute intracranial hemorrhage or infarct  CTA head/neck with patent intracranial circulation. Right supraclinoid aneurysm clip without evidence of recannulization. No intracranial aneurysm. Patent, ECAs, ICAs, no  hemodynamically significant stenosis at  ICA origins by NASCET criteria. Bilateral vertebral arteries are patent without flow limiting stenosis  CT perfusion show areas of elevated Tmax do not follow a vascular distribution and are likely artifactual in nature  ED consulted neurology  h/o brain aneurysm ( s/p clipping in her 20s), per neighbor, is not MRI compatible   Neuro check, aspiration/fall/seizure precaution  EEG  Check Utox  consider repeat CT head after 24 hours  aspirin, statin, ECHO, telemetry monitoring  A1c, lipid panel, TSH, B12, folate, RPR  PT/OT/Speech/Swallow ponce  Psych consulted

## 2021-12-07 NOTE — OCCUPATIONAL THERAPY INITIAL EVALUATION ADULT - PERTINENT HX OF CURRENT PROBLEM, REHAB EVAL
60 y/o female with a PMH of hypothyroidism, bipolar disorder, chronic low back pain (scheduled for surgery  with Dr Larkin in Jan/2022), h/o brain aneurysm ( s/p clipping in her 20s), GERD present to ED with AMS. CT scan of head negative for CVA.

## 2021-12-07 NOTE — SWALLOW BEDSIDE ASSESSMENT ADULT - COMMENTS
PMH hypothyroidism, bipolar disorder, chronic low back pain (scheduled for surgery  with Dr Larkin in Jan/2022), h/o brain aneurysm ( s/p clipping in her 20s), GERD Pt was admitted to a psychiatric hospital several years ago for cutting  12/7 CT brain Motion limited study. No gross evidence of acute intracranial hemorrhage, midline shift or CT evidence of acute territorial infarct.   /

## 2021-12-07 NOTE — SPEECH LANGUAGE PATHOLOGY EVALUATION - SLP PERTINENT HISTORY OF CURRENT PROBLEM
AMS; Aphasia; found in apt by neighbor banging the radiator cover and appeared altered. The only thing patient would response to the neighbor was " no...no" and did not recognizer her

## 2021-12-07 NOTE — EEG REPORT - NS EEG TEXT BOX
[facility]  [facility] Ep[facility]  Report of Routine EEG with Video    [facility]: [address], [place] [address], Phone [phone]  [facility] [place]: [address], [place] [address], Phone [phone]  [place] Office: [address], [address], [place] [address] Phone [phone]    [facility]: [address] [place] [address], Phone [phone]  [place] Office: [address] [place] [address], Phone [phone]    Patient Name: PILAR STAFFORD  Age: 61 year  : 1960  EEG #: [id number]    Study Date: 2021     Technical Information:					  On Instrument: -  Placement and Labeling of Electrodes:  The EEG was performed utilizing 20 channels referential EEG connections (coronal over temporal over parasagittal montage) using all standard 10-20 electrode placements with EKG.  Recording was at a sampling rate of 256 samples per second per channel.  Time synchronized digital video recording was done simultaneously with EEG recording.  A low light infrared camera was used for low light recording.  Wicho and seizure detection algorithms were utilized.    History:   ROUTINE PERFORMED IN LAB  COR: AWAKE, DROWSY, RESTLESS  NO HV DUE COVID  PHOTIC Performed  60 Y/O FEMALE P/W AMS  Hx: HYPOTHYROIDISM, BIPOLAR, BRAIN ANEURYSM s/p CLIPPING IN HER 20s, GERD,CHRONIC BACK PAIN. WHEN ASKED PT IF SHE'S IN PAIN, HER ANSWER IS NO.  BUT UNABLE TO STAY STILL.  R/O SEIZURE    Pertinent Medication  Lovenox  Aspirin    Study Interpretation:  Findings: The background was continuous and reactive. During wakefulness, the posterior dominant rhythm consisted of 7-8 Hz activity, with amplitude to 30 uV, that attenuated to eye opening.     Background Slowing:  Diffuse theta and polymorphic delta slowing.    Focal Slowing:   None were present.  Sleep Background:  Drowsiness was characterized by fragmentation, attenuation, and slowing of the background activity.    Stage II sleep transients were not recorded.    Other Non-Epileptiform Findings:  None were present.    Interictal Epileptiform Activity:   None were present.    Events:  Clinical events: None recorded.  Seizures: None recorded.    Activation Procedures:   Hyperventilation was not performed.    Photic stimulation was not performed.     Artifacts:  Abundant myogenic and movement artifacts were noted that impaired interpretation of the study.    EEG Summary / Classification:  Abnormal EEG   - Moderate generalized slowing.    EEG Impression / Clinical Correlate:  Abnormal technically limited EEG study.  Moderate nonspecific diffuse or multifocal cerebral dysfunction.   No epileptiform pattern or seizure seen.  Abundant muscle and movement artifact impaired interpretation of the recording.    ________________________________________    Gómez Tariq MD  Attending Physician, [facility] [place]

## 2021-12-07 NOTE — BH CONSULTATION LIAISON ASSESSMENT NOTE - RISK ASSESSMENT
Chronic risk factors: single, hx of mood disorder, suspected Cluster B, hx of SIB; Chronic Pain (not optimally managed). Protective factors: young; female gender;  medication and treatment compliant; no hx of high lethality suicide attempts; no hx of substance abuse; stable domicile; no hx of aggression/violence; no legal issues; motivated for help; articulate; + social  support; access to health services. No acute risk factors identified

## 2021-12-07 NOTE — OCCUPATIONAL THERAPY INITIAL EVALUATION ADULT - NS ASR FOLLOW COMMAND OT EVAL
expressive speech aphasia./100% of the time/able to follow multistep instructions/able to follow single-step instructions

## 2021-12-07 NOTE — ED ADULT TRIAGE NOTE - CHIEF COMPLAINT QUOTE
As per EMS pt's roommate called stating pt banging/hitting objects in apartment and incoherent. States pt complained of dizziness and nausea. As per EMS pt's roommate called stating pt banging/hitting objects in apartment and incoherent. States pt complained of dizziness and nausea. Pt awake not answering questions appropriately.

## 2021-12-07 NOTE — BH CONSULTATION LIAISON ASSESSMENT NOTE - NSBHCHARTREVIEWVS_PSY_A_CORE FT
Vital Signs Last 24 Hrs  T(C): 36.2 (07 Dec 2021 10:39), Max: 37.1 (07 Dec 2021 05:06)  T(F): 97.1 (07 Dec 2021 10:39), Max: 98.8 (07 Dec 2021 05:06)  HR: 66 (07 Dec 2021 10:39) (45 - 66)  BP: 133/76 (07 Dec 2021 10:39) (130/72 - 147/114)  BP(mean): --  RR: 22 (07 Dec 2021 10:39) (12 - 22)  SpO2: 98% (07 Dec 2021 10:39) (93% - 98%)

## 2021-12-07 NOTE — SPEECH LANGUAGE PATHOLOGY EVALUATION - SLP CONVERSATIONAL SPEECH
limited to single word and short phrase responses; effortful productions with groping behaviors at spontaneous output levels

## 2021-12-07 NOTE — SPEECH LANGUAGE PATHOLOGY EVALUATION - SLP DIAGNOSIS
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

## 2021-12-07 NOTE — BH CONSULTATION LIAISON ASSESSMENT NOTE - DETAILS
admitted to a psychiatric hospital several years ago for cutting with concurrent passive suicidal ideation; no hx of high lethality attempts

## 2021-12-07 NOTE — ED PROVIDER NOTE - OBJECTIVE STATEMENT
61f pmhx brain aneurysm s/p clip. bipolar disorder, chronic back pain on oxycodone and zanaflex presenting for AMS. last seen normal by her neighbor at 9:30 PM. shortly prior to ED arrival the neighbor heard banging coming from the patient's apartment, when she walked over she saw the patient banging the radiatory cover, the only thing the juana would respond to the neighbor was "no, no" and did not recognize her. juana was admitted to a psychiatric hospital several years ago for cutting, per the neighbot at bedside the patient has been psychaitrically stable since. juana denies any pain in ED, though is confused.

## 2021-12-07 NOTE — ED ADULT NURSE NOTE - NSIMPLEMENTINTERV_GEN_ALL_ED
Implemented All Fall Risk Interventions:  [place] to call system. Call bell, personal items and telephone within reach. Instruct patient to call for assistance. Room bathroom lighting operational. Non-slip footwear when patient is off stretcher. Physically safe environment: no spills, clutter or unnecessary equipment. Stretcher in lowest position, wheels locked, appropriate side rails in place. Provide visual cue, wrist band, yellow gown, etc. Monitor gait and stability. Monitor for mental status changes and reorient to person, place, and time. Review medications for side effects contributing to fall risk. Reinforce activity limits and safety measures with patient and family.

## 2021-12-07 NOTE — OCCUPATIONAL THERAPY INITIAL EVALUATION ADULT - ADDITIONAL COMMENTS
As per patient, pt lives with friend in a private house with 5 steps to enter with a rail. Once inside, the pt main bedroom and bathroom is on that floor when entering. The pts bathroom has a tub/shower combination, regular toilet and no DME. The pt ambulates with no device and does not own any device for ambulation.

## 2021-12-07 NOTE — ED ADULT NURSE NOTE - CHIEF COMPLAINT QUOTE
As per EMS pt's roommate called stating pt banging/hitting objects in apartment and incoherent. States pt complained of dizziness and nausea.

## 2021-12-07 NOTE — OCCUPATIONAL THERAPY INITIAL EVALUATION ADULT - GENERAL OBSERVATIONS, REHAB EVAL
Pt was encountered semi-supine in bed; NAD, portable telemetry +, primafit +, AXOX2, expressive aphasia, coherent, comprehension intact, cooperative, followed commands.

## 2021-12-07 NOTE — H&P ADULT - PROBLEM SELECTOR PLAN 3
EKG with junctional marcelino with HR in high 40s to low 50s  Avoid AB blocking agents  Cardiology consult- Dr Nugent

## 2021-12-07 NOTE — H&P ADULT - ASSESSMENT
Talked to Holly Mcadams ( [phone]), patient's neighbor for 50 years, who know patient medical history very well.  61 years old female with h/o hypothyroidism, bipolar disorder, chronic low back pain (scheduled for surgery  with Dr Larkin in Jan/2022), h/o brain aneurysm ( s/p clipping in her 20s), GERD present to ED with AMS  Was altered in ED. Received benzo, etomidate and haldol in ED for CT scan. EKG with junctional marcelino, VR 49, QTc 431. No leukocytosis, Hb 13.3, Plt 185, Na 137, K 4.5, Cr 0.92, glucose 102, lactate 1.2, ammonia 17, hsTnT 6.8, ETOH < 10. CT head with motion limited study but no gross evidence of acute intracranial hemorrhage or infarct. CTA head/neck with patent intracranial circulation. Right supraclinoid aneurysm clip without evidence of recannulization. No intracranial aneurysm. Patent, ECAs, ICAs, no  hemodynamically significant stenosis at  ICA origins by NASCET criteria. Bilateral vertebral arteries are patent without flow limiting stenosis, CT perfusion show areas of elevated Tmax do not follow a vascular distribution and are likely artifactual in nature.ED consulted neurology      Admitted with AMS

## 2021-12-07 NOTE — BH CONSULTATION LIAISON ASSESSMENT NOTE - NSBHCHARTREVIEWINVESTIGATE_PSY_A_CORE FT
CT Angio unremarkable - no acute pathology; Right supraclinoid aneurysm clip without evidence of recannulization

## 2021-12-07 NOTE — SWALLOW BEDSIDE ASSESSMENT ADULT - SPECIFY REASON(S)
Clinical assessment of swallow function
Clinical assessment of swallow function;  slurred speech, dysphagia screening

## 2021-12-07 NOTE — CONSULT NOTE ADULT - SUBJECTIVE AND OBJECTIVE BOX
To be completed
CARDIOLOGY CONSULT NOTE    Patient is a 61y Female with a known history of :  Altered mental status [R41.82]    Aphasia [R47.01]    Hypothyroidism, unspecified [E03.9]    Bipolar disorder [F31.9]    Chronic back pain [M54.9]    Junctional bradycardia [R00.1]      HPI:  60yo lady with a PMH of hypothyroidism, bipolar disorder, chronic low back pain (scheduled for surgery  with Dr Larkin in Jan/2022), h/o brain aneurysm ( s/p clipping in her 20s), GERD present to ED with AMS. Last known normal was last night 9:30 PM. Today at around 4:30AM, neighbor heard banging noise coming from patient's apartment. Patient was seen banging the radiatory cover and appear altered. The only thing patient would response to the neighbor was " no...no" and did not recognizer her. Per neighbor, patient was admitted to a psychiatric hospital several years ago for cutting herself, has since then stable and is following with psych outpatient.     In the ED, he received benzo, etomidate and haldol for CT scan.   ECG:  sinus marcelino 49bpm; long 1st degree AVB  Labs otherwise unremarkable.  CT head with motion limited study but no gross evidence of acute intracranial hemorrhage or infarct. CTA head/neck with patent intracranial circulation. Right supraclinoid aneurysm clip without evidence of recannulization. No intracranial aneurysm. Patent, ECAs, ICAs, no  hemodynamically significant stenosis at  ICA origins by NASCET criteria. Bilateral vertebral arteries are patent without flow limiting stenosisCT perfusion show areas of elevated Tmax do not follow a vascular distribution and are likely artifactual in nature.        REVIEW OF SYSTEMS:    CONSTITUTIONAL: No fever, weight loss, or fatigue  EYES: No eye pain, visual disturbances, or discharge  ENMT:  No difficulty hearing, tinnitus, vertigo; No sinus or throat pain  NECK: No pain or stiffness  BREASTS: No pain, masses, or nipple discharge  RESPIRATORY: No cough, wheezing, chills or hemoptysis; No shortness of breath  CARDIOVASCULAR: No chest pain, palpitations, dizziness, or leg swelling  GASTROINTESTINAL: No abdominal or epigastric pain. No nausea, vomiting, or hematemesis; No diarrhea or constipation. No melena or hematochezia.  GENITOURINARY: No dysuria, frequency, hematuria, or incontinence  NEUROLOGICAL: +confusion  SKIN: No itching, burning, rashes, or lesions   LYMPH NODES: No enlarged glands  ENDOCRINE: No heat or cold intolerance; No hair loss  MUSCULOSKELETAL: No joint pain or swelling; No muscle, back, or extremity pain  PSYCHIATRIC: No depression, anxiety, mood swings, or difficulty sleeping  HEME/LYMPH: No easy bruising, or bleeding gums  ALLERGY AND IMMUNOLOGIC: No hives or eczema    MEDICATIONS  (STANDING):  aspirin  chewable 81 milliGRAM(s) Oral daily  atorvastatin 40 milliGRAM(s) Oral at bedtime  enoxaparin Injectable 40 milliGRAM(s) SubCutaneous daily  glucagon  Injectable 1 milliGRAM(s) IntraMuscular once  levothyroxine 75 MICROGram(s) Oral daily  pantoprazole    Tablet 40 milliGRAM(s) Oral before breakfast    MEDICATIONS  (PRN):  melatonin 3 milliGRAM(s) Oral at bedtime PRN Insomnia  ondansetron Injectable 4 milliGRAM(s) IV Push every 8 hours PRN Nausea and/or Vomiting      ALLERGIES: No Known Allergies      FAMILY HISTORY:  FH: breast cancer (Mother)        PHYSICAL EXAMINATION:  -----------------------------  T(C): 36.4 (12-07-21 @ 12:47), Max: 37.1 (12-07-21 @ 05:06)  HR: 67 (12-07-21 @ 12:47) (45 - 67)  BP: 153/69 (12-07-21 @ 12:47) (130/72 - 153/69)  RR: 22 (12-07-21 @ 12:47) (12 - 22)  SpO2: 96% (12-07-21 @ 12:47) (93% - 98%)    Constitutional: no acute distress.   Eyes: the conjunctiva exhibited no abnormalities and the eyelids demonstrated no xanthelasmas.   HEENT: normal oral mucosa, no oral pallor and no oral cyanosis.   Neck: normal jugular venous A waves present, normal jugular venous V waves present and no jugular venous kyle A waves.   Pulmonary: no respiratory distress, normal respiratory rhythm and effort, no accessory muscle use and lungs were clear to auscultation bilaterally.   Cardiovascular: heart rate and rhythm were normal, normal S1 and S2 and no murmur, gallop, rub, heave or thrill are present.   Abdomen: soft, non-tender, no hepato-splenomegaly and no abdominal mass palpated.   Musculoskeletal: the gait could not be assessed..   Extremities: no clubbing of the fingernails, no localized cyanosis, no petechial hemorrhages and no ischemic changes.   Skin: normal skin color and pigmentation, no rash, no venous stasis, no skin lesions, no skin ulcer and no xanthoma was observed.   Psychiatric: oriented to person; confused about location/time but aware she is making mistakes and laughing about it        LABS:   --------  12-07    137  |  106  |  17  ----------------------------<  102<H>  4.5   |  23  |  0.92    Ca    8.7      07 Dec 2021 06:27    TPro  7.4  /  Alb  3.7  /  TBili  0.8  /  DBili  x   /  AST  32  /  ALT  18  /  AlkPhos  67  12-07                         13.3   4.72  )-----------( 185      ( 07 Dec 2021 06:27 )             39.5     PT/INR - ( 07 Dec 2021 06:27 )   PT: 12.4 sec;   INR: 1.07 ratio         PTT - ( 07 Dec 2021 06:27 )  PTT:44.5 sec            RADIOLOGY:  -----------------    ECG:  sinus marcelino 49bpm; long 1st degree AVB

## 2021-12-07 NOTE — PHYSICAL THERAPY INITIAL EVALUATION ADULT - PERTINENT HX OF CURRENT PROBLEM, REHAB EVAL
This is the hx of CONCEPCION. a 60 y/o female patient who was admitted to [facility] due to complications of CVA affecting medical condition and with subsequent affection on functional mobility. CT Brain stroke 12/7/21: No acute intracranial Hemorrhage.

## 2021-12-07 NOTE — PHYSICAL THERAPY INITIAL EVALUATION ADULT - CRITERIA FOR SKILLED THERAPEUTIC INTERVENTIONS
home with outpatient/impairments found/functional limitations in following categories/risk reduction/prevention/rehab potential/therapy frequency/predicted duration of therapy intervention/anticipated discharge recommendation

## 2021-12-07 NOTE — ED PROVIDER NOTE - PHYSICAL EXAMINATION
General: Awake, alert and oriented. No acute distress. Well developed, hydrated and nourished. Appears stated age.   Skin: Skin in warm, dry and intact without rashes or lesions. Appropriate color for ethnicity  HENMT: head normocephalic and atraumatic; bilateral external ears without swelling. no nasal discharge. moist oral mucosa. supple neck, trachea midline  EYES: Conjunctiva clear. nonicteric sclera. EOM intact, Eyelids are normal in appearance without swelling or lesions.  Cardiac: well perfused, s1, s2, rrr  Respiratory: breathing comfortably on room air. no audible wheezing or stridor  Abdominal: nondistended  MSK: Neck and back are without deformity, visible external skin changes, or signs of trauma. Curvature of the cervical, thoracic, and lumbar spine are within normal limits. no external signs of trauma. no apparent deficits in ROM of any extremity  Neurological: see stroke section for detailed exam  Psychiatric: Appropriate mood and affect. No evidence of visual or auditory hallucinations.

## 2021-12-07 NOTE — SPEECH LANGUAGE PATHOLOGY EVALUATION - COMMENTS
encourage use of voice and words during pt care; provide two verbal choices to facilitate responses could not name familiar events, places not assessed adequate for communication Not assessed PMH hypothyroidism, bipolar disorder, chronic low back pain (scheduled for surgery  with Dr Larkin in Jan/2022), h/o brain aneurysm ( s/p clipping in her 20s), GERD;   12/7  note Depressive disorder due to separate medical condition  12/7 CT brain Motion limited study. No gross evidence of acute intracranial hemorrhage, midline shift or CT evidence of acute territorial infarct. could not name familiar events, places; during naming function task also limited to 1-2 word responses; pt unable to elaborate

## 2021-12-07 NOTE — ED ADULT NURSE NOTE - OBJECTIVE STATEMENT
Patient presents to ED awake, alert and oriented to self in bed 1. As per roommate, patient last seen normal at 930 PM and states patient was banging on her door and could only say "no". States that patient is usually co Patient presents to ED awake, alert and oriented to self in bed 1. As per roommate, patient last seen normal at 930 PM and states patient was banging on her door and could only say "no". Roommate states that patient "is usually coherent". When patient is interviewed, patient only responds her own name to "what year is it", "where are you". Endorses patient has been taking pain killers for chronic back pain, but also "snorts painkillers". Code stroke activate by MD Foster. Placed on cardiac monitor. Roommate endorses not pertinent PMH. PSH brain clips. Endorses NKA. Given 4mg Zofran by EMS. No active nausea noted.

## 2021-12-07 NOTE — OCCUPATIONAL THERAPY INITIAL EVALUATION ADULT - TRANSFER TRAINING, PT EVAL
Patient will be able to perform functional transfers, using least restrictive device, independently within 1 week.

## 2021-12-07 NOTE — CONSULT NOTE ADULT - ASSESSMENT
To be completed    Acute alteration in mental status.  Bizarre episode at home (lives alone) banging on radiator cover.    UTOX + for benzos and THC.    Suspect acute toxic metabolic encephalopathy.      Cognitive deficits on mental status examination, raising concern that in addition to any acute alteration she may be demented.      Generalized weakness.      Psych recommendations noted.      RECOMMENDATIONS    Non-con MR brain.    If not already ordered:  B12, folate, methylmalonic acid, homocysteine, TSH, ESR, CRP, LUPIS, SPEP, RPR, copper, zinc, B1 level.        To be completed    Acute alteration in mental status.  Bizarre episode at home (lives alone) banging on radiator cover.    UTOX + for benzos and THC.    Suspect acute toxic metabolic encephalopathy.      Cognitive deficits on mental status examination, raising concern that in addition to any acute alteration she may be demented.      Generalized weakness.      Psych recommendations noted.      RECOMMENDATIONS    Non-con MR brain.    If not already ordered:  B12, folate, methylmalonic acid, homocysteine, TSH, ESR, CRP, LUPIS, SPEP, RPR, copper, zinc, B1 level.       The assay for B12 does not measure B12 level directly.  False elevations (to or above the normal range) occur with pernicious anemia due to intrinsic factor antibodies, which may or may not be detected by the antibody test.  B12-like compounds of vegetable origin (a problem with vegans) without cobalamin activity also can lead to falsely elevated determinations.  Methylmalonic acid (MMA) and homocysteine (Hcy) determinations are necessary to elucidate what is happening.    Irrespective of B12 and folate levels:       high Hcy w normal MMA implies folate deficiency;        high MMA and Hcy implies B12 deficiency +/- folate deficiency.

## 2021-12-07 NOTE — ED PROVIDER NOTE - CADM POA CENTRAL LINE
[Examination Of The Breasts] : a normal appearance [Normal] : normal [No Masses] : no breast masses were palpable No

## 2021-12-07 NOTE — PATIENT PROFILE ADULT - FALL HARM RISK - HARM RISK INTERVENTIONS
Assistance with ambulation/Assistance OOB with selected safe patient handling equipment/Communicate Risk of Fall with Harm to all staff/Discuss with provider need for PT consult/Monitor gait and stability/Reinforce activity limits and safety measures with patient and family/Tailored Fall Risk Interventions/Visual Cue: Yellow wristband and red socks/Bed in lowest position, wheels locked, appropriate side rails in place/Call bell, personal items and telephone in reach/Instruct patient to call for assistance before getting out of bed or chair/Non-slip footwear when patient is out of bed/[place] to call system/Physically safe environment - no spills, clutter or unnecessary equipment/Purposeful Proactive Rounding/Room/bathroom lighting operational, light cord in reach

## 2021-12-07 NOTE — ED PROVIDER NOTE - CLINICAL SUMMARY MEDICAL DECISION MAKING FREE TEXT BOX
patient with aphasia in ED, repeating several words several times and not answering questions appropriately, otherwise neurologically intact. last known normal 9:30 Pm, no in window for tpa, stroke code called for evaluation of possible stroke, will pursue concurrent infectious, metabolic, hematologic and toxicologic workup. patient with aphasia in ED, repeating several words several times and not answering questions appropriately, otherwise neurologically intact. last known normal 9:30 Pm, no in window for tpa, stroke code called for evaluation of possible stroke, will pursue concurrent infectious, metabolic, hematologic and toxicologic workup. afebrile, no leukocytosis, no evidence of infectious etiology of symptoms

## 2021-12-07 NOTE — SWALLOW BEDSIDE ASSESSMENT ADULT - SLP PERTINENT HISTORY OF CURRENT PROBLEM
AMSAphasia.
AMS; Aphasia; found in apt by neighbor banging the radiator cover and appear altered. The only thing patient would response to the neighbor was " no...no" and did not recognizer her

## 2021-12-07 NOTE — SPEECH LANGUAGE PATHOLOGY EVALUATION - SLP PATIENT/FAMILY GOALS STATEMENT
pt stated " what's wrong with me, why did this happen, I just can't say it" ; demonstrated awareness of communication impairment

## 2021-12-07 NOTE — H&P ADULT - NSHPSOURCEINFOTX_GEN_ALL_CORE
Talked to Holly Mcadams ( [phone]), patient's neighbor for 50 years, who know patient medical history very well.

## 2021-12-08 LAB
A1C WITH ESTIMATED AVERAGE GLUCOSE RESULT: 5 % — SIGNIFICANT CHANGE UP (ref 4–5.6)
ALBUMIN SERPL ELPH-MCNC: 3.3 G/DL — SIGNIFICANT CHANGE UP (ref 3.3–5)
ALP SERPL-CCNC: 63 U/L — SIGNIFICANT CHANGE UP (ref 40–120)
ALT FLD-CCNC: 17 U/L — SIGNIFICANT CHANGE UP (ref 12–78)
ANION GAP SERPL CALC-SCNC: 7 MMOL/L — SIGNIFICANT CHANGE UP (ref 5–17)
AST SERPL-CCNC: 22 U/L — SIGNIFICANT CHANGE UP (ref 15–37)
BILIRUB SERPL-MCNC: 0.6 MG/DL — SIGNIFICANT CHANGE UP (ref 0.2–1.2)
BUN SERPL-MCNC: 17 MG/DL — SIGNIFICANT CHANGE UP (ref 7–23)
CALCIUM SERPL-MCNC: 8.4 MG/DL — LOW (ref 8.5–10.1)
CHLORIDE SERPL-SCNC: 107 MMOL/L — SIGNIFICANT CHANGE UP (ref 96–108)
CHOLEST SERPL-MCNC: 194 MG/DL — SIGNIFICANT CHANGE UP
CO2 SERPL-SCNC: 27 MMOL/L — SIGNIFICANT CHANGE UP (ref 22–31)
CREAT SERPL-MCNC: 0.77 MG/DL — SIGNIFICANT CHANGE UP (ref 0.5–1.3)
CRP SERPL-MCNC: <3 MG/L — SIGNIFICANT CHANGE UP
ERYTHROCYTE [SEDIMENTATION RATE] IN BLOOD: 7 MM/HR — SIGNIFICANT CHANGE UP (ref 0–20)
ESTIMATED AVERAGE GLUCOSE: 97 MG/DL — SIGNIFICANT CHANGE UP (ref 68–114)
GLUCOSE SERPL-MCNC: 93 MG/DL — SIGNIFICANT CHANGE UP (ref 70–99)
HCT VFR BLD CALC: 38.5 % — SIGNIFICANT CHANGE UP (ref 34.5–45)
HCV AB S/CO SERPL IA: 0.08 S/CO — SIGNIFICANT CHANGE UP (ref 0–0.99)
HCV AB SERPL-IMP: SIGNIFICANT CHANGE UP
HDLC SERPL-MCNC: 74 MG/DL — SIGNIFICANT CHANGE UP
HGB BLD-MCNC: 12.8 G/DL — SIGNIFICANT CHANGE UP (ref 11.5–15.5)
LIPID PNL WITH DIRECT LDL SERPL: 110 MG/DL — HIGH
MAGNESIUM SERPL-MCNC: 2.1 MG/DL — SIGNIFICANT CHANGE UP (ref 1.6–2.6)
MCHC RBC-ENTMCNC: 30.3 PG — SIGNIFICANT CHANGE UP (ref 27–34)
MCHC RBC-ENTMCNC: 33.2 GM/DL — SIGNIFICANT CHANGE UP (ref 32–36)
MCV RBC AUTO: 91.2 FL — SIGNIFICANT CHANGE UP (ref 80–100)
NON HDL CHOLESTEROL: 120 MG/DL — SIGNIFICANT CHANGE UP
NRBC # BLD: 0 /100 WBCS — SIGNIFICANT CHANGE UP (ref 0–0)
PHOSPHATE SERPL-MCNC: 3.9 MG/DL — SIGNIFICANT CHANGE UP (ref 2.5–4.5)
PLATELET # BLD AUTO: 192 K/UL — SIGNIFICANT CHANGE UP (ref 150–400)
POTASSIUM SERPL-MCNC: 3.4 MMOL/L — LOW (ref 3.5–5.3)
POTASSIUM SERPL-SCNC: 3.4 MMOL/L — LOW (ref 3.5–5.3)
PROT SERPL-MCNC: 6.6 G/DL — SIGNIFICANT CHANGE UP (ref 6–8.3)
PROT SERPL-MCNC: 6.6 G/DL — SIGNIFICANT CHANGE UP (ref 6–8.3)
PROT SERPL-MCNC: 6.6 GM/DL — SIGNIFICANT CHANGE UP (ref 6–8.3)
RBC # BLD: 4.22 M/UL — SIGNIFICANT CHANGE UP (ref 3.8–5.2)
RBC # FLD: 12.9 % — SIGNIFICANT CHANGE UP (ref 10.3–14.5)
SODIUM SERPL-SCNC: 141 MMOL/L — SIGNIFICANT CHANGE UP (ref 135–145)
T PALLIDUM AB TITR SER: NEGATIVE — SIGNIFICANT CHANGE UP
TRIGL SERPL-MCNC: 53 MG/DL — SIGNIFICANT CHANGE UP
WBC # BLD: 6.55 K/UL — SIGNIFICANT CHANGE UP (ref 3.8–10.5)
WBC # FLD AUTO: 6.55 K/UL — SIGNIFICANT CHANGE UP (ref 3.8–10.5)

## 2021-12-08 PROCEDURE — 93306 TTE W/DOPPLER COMPLETE: CPT | Mod: 26

## 2021-12-08 PROCEDURE — 99232 SBSQ HOSP IP/OBS MODERATE 35: CPT

## 2021-12-08 PROCEDURE — 99231 SBSQ HOSP IP/OBS SF/LOW 25: CPT

## 2021-12-08 RX ORDER — CLONAZEPAM 1 MG
0 TABLET ORAL
Qty: 0 | Refills: 0 | DISCHARGE

## 2021-12-08 RX ORDER — ESCITALOPRAM OXALATE 10 MG/1
1 TABLET, FILM COATED ORAL
Qty: 0 | Refills: 0 | DISCHARGE

## 2021-12-08 RX ORDER — TRAZODONE HCL 50 MG
50 TABLET ORAL AT BEDTIME
Refills: 0 | Status: DISCONTINUED | OUTPATIENT
Start: 2021-12-08 | End: 2021-12-09

## 2021-12-08 RX ORDER — POTASSIUM CHLORIDE 20 MEQ
40 PACKET (EA) ORAL ONCE
Refills: 0 | Status: COMPLETED | OUTPATIENT
Start: 2021-12-08 | End: 2021-12-08

## 2021-12-08 RX ORDER — LEVOTHYROXINE SODIUM 125 MCG
88 TABLET ORAL DAILY
Refills: 0 | Status: DISCONTINUED | OUTPATIENT
Start: 2021-12-08 | End: 2021-12-09

## 2021-12-08 RX ORDER — ATENOLOL 25 MG/1
0 TABLET ORAL
Qty: 0 | Refills: 0 | DISCHARGE

## 2021-12-08 RX ORDER — SUMATRIPTAN SUCCINATE 4 MG/.5ML
20 INJECTION, SOLUTION SUBCUTANEOUS
Qty: 0 | Refills: 0 | DISCHARGE

## 2021-12-08 RX ORDER — LEVOTHYROXINE SODIUM 125 MCG
1 TABLET ORAL
Qty: 0 | Refills: 0 | DISCHARGE

## 2021-12-08 RX ORDER — DULOXETINE HYDROCHLORIDE 30 MG/1
60 CAPSULE, DELAYED RELEASE ORAL DAILY
Refills: 0 | Status: DISCONTINUED | OUTPATIENT
Start: 2021-12-08 | End: 2021-12-09

## 2021-12-08 RX ADMIN — Medication 81 MILLIGRAM(S): at 11:09

## 2021-12-08 RX ADMIN — PANTOPRAZOLE SODIUM 40 MILLIGRAM(S): 20 TABLET, DELAYED RELEASE ORAL at 05:25

## 2021-12-08 RX ADMIN — ATORVASTATIN CALCIUM 40 MILLIGRAM(S): 80 TABLET, FILM COATED ORAL at 21:11

## 2021-12-08 RX ADMIN — OXYCODONE HYDROCHLORIDE 30 MILLIGRAM(S): 5 TABLET ORAL at 11:11

## 2021-12-08 RX ADMIN — ENOXAPARIN SODIUM 40 MILLIGRAM(S): 100 INJECTION SUBCUTANEOUS at 11:09

## 2021-12-08 RX ADMIN — Medication 75 MICROGRAM(S): at 05:25

## 2021-12-08 RX ADMIN — OXYCODONE HYDROCHLORIDE 30 MILLIGRAM(S): 5 TABLET ORAL at 17:36

## 2021-12-08 RX ADMIN — Medication 40 MILLIEQUIVALENT(S): at 13:15

## 2021-12-08 RX ADMIN — Medication 50 MILLIGRAM(S): at 21:11

## 2021-12-08 RX ADMIN — OXYCODONE HYDROCHLORIDE 30 MILLIGRAM(S): 5 TABLET ORAL at 12:11

## 2021-12-08 RX ADMIN — DULOXETINE HYDROCHLORIDE 60 MILLIGRAM(S): 30 CAPSULE, DELAYED RELEASE ORAL at 18:41

## 2021-12-08 RX ADMIN — OXYCODONE HYDROCHLORIDE 30 MILLIGRAM(S): 5 TABLET ORAL at 18:36

## 2021-12-08 NOTE — PROGRESS NOTE ADULT - ASSESSMENT
62yo F with a PMH of hypothyroidism, bipolar disorder, chronic low back pain (scheduled for surgery  with Dr Larkin in Jan/2022), h/o brain aneurysm ( s/p clipping in her 20s), GERD present to ED with AMS, neighbor heard banging noise coming from patient's apartment. Patient was seen banging the radiatory cover and appear altered. The only thing patient would response to the neighbor was " no...no" and did not recognizer her. Per neighbor, patient was admitted to a psychiatric hospital several years ago for cutting herself, has since then stable and is following with psych outpatient.     In the ED, he received benzo, etomidate and haldol for CT scan.   ECG:  sinus marcelino 49bpm; long 1st degree AVB  Sinus ist degree AV block 60-70s   Labs otherwise unremarkable.  CT head with motion limited study but no gross evidence of acute intracranial hemorrhage or infarct. CTA head/neck with patent intracranial circulation. Right supraclinoid aneurysm clip without evidence of recannulization. No intracranial aneurysm. Patent, ECAs, ICAs, no  hemodynamically significant stenosis at  ICA origins by NASCET criteria. Bilateral vertebral arteries are patent without flow limiting stenosisCT perfusion show areas of elevated Tmax do not follow a vascular distribution and are likely artifactual in nature.    -monitor on tele  -Neuro following,  planning for MRI brain if ok w/ prior clips  -cont asa/statin  -TSH 8.390 with total T3 61 ( normal ), on synthroid   -avoid all AV gonzales blocking agents,  HR 60-70s first degree AV block, in 40s overnight  -2D echo pending   60yo F with a PMH of hypothyroidism, bipolar disorder, chronic low back pain (scheduled for surgery  with Dr Larkin in Jan/2022), h/o brain aneurysm ( s/p clipping in her 20s), GERD present to ED with AMS, neighbor heard banging noise coming from patient's apartment. Patient was seen banging the radiatory cover and appear altered. The only thing patient would response to the neighbor was " no...no" and did not recognizer her. Per neighbor, patient was admitted to a psychiatric hospital several years ago for cutting herself, has since then stable and is following with psych outpatient.     In the ED, he received benzo, etomidate and haldol for CT scan.   ECG:  sinus marcelino 49bpm; long 1st degree AVB  Sinus ist degree AV block 60-70s   Labs otherwise unremarkable.  CT head with motion limited study but no gross evidence of acute intracranial hemorrhage or infarct. CTA head/neck with patent intracranial circulation. Right supraclinoid aneurysm clip without evidence of recannulization. No intracranial aneurysm. Patent, ECAs, ICAs, no  hemodynamically significant stenosis at  ICA origins by NASCET criteria. Bilateral vertebral arteries are patent without flow limiting stenosisCT perfusion show areas of elevated Tmax do not follow a vascular distribution and are likely artifactual in nature.  Pt now awake, responsive, and talking to his friend at bedside, c/o some Rt leg discomfort     -monitor on tele  -Neuro following,  planning for MRI brain if ok w/ prior clips  -cont asa/statin  -TSH 8.390 with total T3 61 ( normal ),  synthroid dose adjusted  -avoid all AV gonzales blocking agents,  HR 60-70s first degree AV block, in 40s overnight  -2D echo pending

## 2021-12-08 NOTE — CHART NOTE - NSCHARTNOTEFT_GEN_A_CORE
Phone call attempt made to patient's neurosurgeon Dr. JOSHUA Maxwell [phone]. Message left for patient's doctor in regards to patient's clip if it is MRI compatible. Phone call attempt made to patient's neurosurgeon Dr. JOSHUA Maxwell [phone]. Message left for patient's doctor in regards to patient's clip if it is MRI compatible.    addendum:     Patient says patient's clip is not MRI compatible since 1987, which she got her clip.

## 2021-12-08 NOTE — PROGRESS NOTE ADULT - ASSESSMENT
Holly Pema ( [phone]), patient's neighbor for 50 years     61 years old female    ith h/o hypothyroidism, bipolar disorder, chronic low back pain (scheduled for surgery  with Dr Larkin in Jan/2022), h/o brain aneurysm ( s/p clipping in her 20s), GERD    present to ED with AMS   in ED. Received benzo, etomidate and haldol in ED for CT scan.     EKG with junctional marcelino, VR 49, QTc 431.  Llactate 1.2, ammonia 17, hsTnT 6.8, ETOH < 10.    CT head, no   hemorrhage or infarct.    CTA head/neck with patent intracranial circulation. Right supraclinoid aneurysm clip without evidence of recannulization. No intracranial aneurysm. Patent, ECAs, ICAs, no  hemodynamically significant stenosis at  ICA origins by NASCET criteria. Bilateral vertebral arteries are patent without flow limiting stenosis,    * AMS,  from  psychoses  pt leyva s not  have  a stroke    ct head,  no  infarct     neuro  called on arrival  * HLD, on asa.  lipitor  seen by card /   bradycardia, not significant  *  hypothyroid, on Synthroid   *  h/o  psychoses,    per  osych, pt is  off  her meds, cymbalta/  trazadone    [facility] Psych f/p   on  dvt ppx/  PT  eval.  check cxr               Admitted with AMS  Holly Pema ( [phone]), patient's neighbor for 50 years     61 years old female    ith h/o hypothyroidism, bipolar disorder, chronic low back pain (scheduled for surgery  with Dr Larkin in Jan/2022), h/o brain aneurysm ( s/p clipping in her 20s), GERD    present to ED with AMS   in ED. Received benzo, etomidate and haldol in ED for CT scan.     EKG with junctional marcelino, VR 49, QTc 431.  Llactate 1.2, ammonia 17, hsTnT 6.8, ETOH < 10.    CT head, no   hemorrhage or infarct.    CTA head/neck with patent intracranial circulation. Right supraclinoid aneurysm clip without evidence of recannulization. No intracranial aneurysm. Patent, ECAs, ICAs, no  hemodynamically significant stenosis at  ICA origins by NASCET criteria. Bilateral vertebral arteries are patent without flow limiting stenosis,    * AMS,  from  psychoses  pt leyva s not  have  a stroke    ct head,  no  infarct     neuro  called on arrival  * HLD, on asa.  lipitor  seen by card /   bradycardia, not significant  *  hypothyroid, on Synthroid   *  h/o  psychoses,    per  psych, pt is  off  her meds, cymbalta/  trazadone    [facility] Psych f/p   on  dvt ppx/   check cxr  pt alert,  had lunch.  affect is normal  now    drug screen +  for  opiates/ thc  friend  at bedside               Admitted with AMS

## 2021-12-08 NOTE — BH CONSULTATION LIAISON PROGRESS NOTE - CURRENT MEDICATION
MEDICATIONS  (STANDING):  aspirin  chewable 81 milliGRAM(s) Oral daily  atorvastatin 40 milliGRAM(s) Oral at bedtime  dextrose 40% Gel 15 Gram(s) Oral once  dextrose 50% Injectable 25 Gram(s) IV Push once  dextrose 50% Injectable 12.5 Gram(s) IV Push once  dextrose 50% Injectable 25 Gram(s) IV Push once  enoxaparin Injectable 40 milliGRAM(s) SubCutaneous daily  glucagon  Injectable 1 milliGRAM(s) IntraMuscular once  levothyroxine 75 MICROGram(s) Oral daily  pantoprazole    Tablet 40 milliGRAM(s) Oral before breakfast    MEDICATIONS  (PRN):  melatonin 3 milliGRAM(s) Oral at bedtime PRN Insomnia  ondansetron Injectable 4 milliGRAM(s) IV Push every 8 hours PRN Nausea and/or Vomiting  oxyCODONE    IR 30 milliGRAM(s) Oral every 6 hours PRN Severe Pain (7 - 10)

## 2021-12-08 NOTE — BH CONSULTATION LIAISON PROGRESS NOTE - NSBHFUPINTERVALHXFT_PSY_A_CORE
Patient seen by PT & cleared for home w/ outpatient services. Seen by Speech Eval ("Expressive language deficits consistent with non-fluent Aphasia; word finding difficulties and paucity of verbal output with increased impairments at spontaneous language levels ; receptive skills appear stronger; speech intelligibility is WNL"). Otherwise, no significant interval events.  Patient seen by PT & cleared for home w/ outpatient services. Seen by Speech Eval ("Expressive language deficits consistent with non-fluent Aphasia; word finding difficulties and paucity of verbal output with increased impairments at spontaneous language levels ; receptive skills appear stronger; speech intelligibility is WNL"). Otherwise, no significant interval events. MSE - back to baseline; speech fluent; regular volume, rate. Recalls what happened and confirmed she took extra pills due to worsened pain because she was in distress to the point that she could not get of bed. Denied any suicidality. Endorses stable euthymic mood, regular sleep / appetite / energy level / concentration / bathroom habits. Denies any symptoms of hypomania/stephon/major psychosis/depression/ anxiety/panic. Denies any active or passive suicidal or homicidal ideation. Names protective factors (donta; family; hope for future). Endorses medication compliance. Denies adverse medication side effects

## 2021-12-08 NOTE — BH CONSULTATION LIAISON PROGRESS NOTE - NSBHASSESSMENTFT_PSY_ALL_CORE
61 female with hx of Mood Disorder, suspected Borderline character traits, hx of SIB, 1 prior psychiatric admission several years ago for cutting with concurrent passive suicidal ideation; no hx of high lethality attempts; currently seeing psychiatrist Dr Gillespie for monthly medication management (trazodone and Cymbalta), denies substance abuse, reports to be medication compliant, has been psychiatrically stable for years);  + hx of brain aneurysm s/p clip; + with Chronic Pain not optimally managed & not seeing a Pain Management Specialist (PMD RX Oxycodone, Xanaflex), scheduled for Spine surgery (fusion) with Dr Larkin 01/22 at , admitted with presenting for AMS preceded by days of increased chronic back pain resulting in inability to get out of bed / ambulate. History and collateral most consistent with unintentional overuse of analgesic in order to combat worsened pain resulting in AMS. Patient still has residual memory/recall deficits on 12/7/21 which should clear by 12/8/21   61 female with hx of Mood Disorder, suspected Borderline character traits, hx of SIB, 1 prior psychiatric admission several years ago for cutting with concurrent passive suicidal ideation; no hx of high lethality attempts; currently seeing psychiatrist Dr Gillespie for monthly medication management (trazodone and Cymbalta), denies substance abuse, reports to be medication compliant, has been psychiatrically stable for years);  + hx of brain aneurysm s/p clip; + with Chronic Pain not optimally managed & not seeing a Pain Management Specialist (PMD RX Oxycodone, Xanaflex), scheduled for Spine surgery (fusion) with Dr Larkin 01/22 at , admitted with presenting for AMS preceded by days of increased chronic back pain resulting in inability to get out of bed / ambulate. History and collateral most consistent with unintentional overuse of analgesic in order to combat worsened pain resulting in AMS. Patient still has residual memory/recall deficits on 12/7/21 which cleared by 12/8/21.

## 2021-12-08 NOTE — BH CONSULTATION LIAISON PROGRESS NOTE - NSBHCHARTREVIEWVS_PSY_A_CORE FT
Vital Signs Last 24 Hrs  T(C): 37 (08 Dec 2021 10:41), Max: 37.7 (07 Dec 2021 23:40)  T(F): 98.6 (08 Dec 2021 10:41), Max: 99.9 (07 Dec 2021 23:40)  HR: 67 (08 Dec 2021 10:41) (54 - 96)  BP: 101/68 (08 Dec 2021 10:41) (101/68 - 123/85)  BP(mean): --  RR: 18 (08 Dec 2021 10:41) (18 - 18)  SpO2: 96% (08 Dec 2021 10:41) (94% - 96%)

## 2021-12-08 NOTE — PROGRESS NOTE ADULT - SUBJECTIVE AND OBJECTIVE BOX
Patient is a 61y old  Female who presents with AMS (08 Dec 2021 10:27)    PAST MEDICAL & SURGICAL HISTORY:  Bipolar disorder    Hypothyroidism    Chronic radicular low back pain    GERD    brain aneurysm ( s/p clipping in her 20s)    INTERVAL HISTORY:  	  MEDICATIONS:  MEDICATIONS  (STANDING):  aspirin  chewable 81 milliGRAM(s) Oral daily  atorvastatin 40 milliGRAM(s) Oral at bedtime  enoxaparin Injectable 40 milliGRAM(s) SubCutaneous daily  glucagon  Injectable 1 milliGRAM(s) IntraMuscular once  levothyroxine 75 MICROGram(s) Oral daily  pantoprazole    Tablet 40 milliGRAM(s) Oral before breakfast    MEDICATIONS  (PRN):  melatonin 3 milliGRAM(s) Oral at bedtime PRN Insomnia  ondansetron Injectable 4 milliGRAM(s) IV Push every 8 hours PRN Nausea and/or Vomiting  oxyCODONE    IR 30 milliGRAM(s) Oral every 6 hours PRN Severe Pain (7 - 10)    Vitals:  T(F): 98.6 (12-08-21 @ 10:41), Max: 99.9 (12-07-21 @ 23:40)  HR: 67 (12-08-21 @ 10:41) (54 - 96)  BP: 101/68 (12-08-21 @ 10:41) (101/68 - 153/69)  RR: 18 (12-08-21 @ 10:41) (18 - 22)  SpO2: 96% (12-08-21 @ 10:41) (94% - 96%)    12-07 @ 07:01  -  12-08 @ 07:00  --------------------------------------------------------  IN:    Oral Fluid: 980 mL  Total IN: 980 mL    OUT:  Total OUT: 0 mL    Total NET: 980 mL    Weight (kg): 72.4 (12-07 @ 12:47)  BMI (kg/m2): 25.8 (12-07 @ 12:47)    PHYSICAL EXAM:  Neuro: Awake, responsive  CV: S1 S2 RRR  Lungs: CTABL  GI: Soft, BS +, ND, NT  Extremities: No edema    TELEMETRY: first degree     RADIOLOGY: < from: CT Brain Stroke Protocol (12.07.21 @ 06:04) >  Motion limited study. No gross evidence of acute intracranial hemorrhage, midline shift or CT evidence of acute territorial infarct.    If the patient's symptoms persist, consider short interval follow-up head CT or brain MRI if there are no MRI contraindications.    < end of copied text >    DIAGNOSTIC TESTING:    [p ] Echocardiogram:     LABS:	 	    CARDIAC MARKERS:  Troponin I, High Sensitivity Result: 6.8 ng/L (12-07 @ 06:27)    08 Dec 2021 07:13    141    |  107    |  17     ----------------------------<  93     3.4     |  27     |  0.77   07 Dec 2021 06:27    137    |  106    |  17     ----------------------------<  102    4.5     |  23     |  0.92     Ca    8.4        08 Dec 2021 07:13  Phos  3.9       08 Dec 2021 07:13  Mg     2.1       08 Dec 2021 07:13    TPro  6.6    /  Alb  3.3    /  TBili  0.6    /  DBili  x      /  AST  22     /  ALT  17     /  AlkPhos  63     08 Dec 2021 07:13                        12.8   6.55  )-----------( 192      ( 08 Dec 2021 07:13 )             38.5 ,                       13.3   4.72  )-----------( 185      ( 07 Dec 2021 06:27 )             39.5     TSH: Thyroid Stimulating Hormone, Serum: 8.390 uU/mL (12-07 @ 12:41)  INR: 1.07 ratio (12-07 @ 06:27)           Patient is a 61y old  Female who presents with AMS (08 Dec 2021 10:27)    PAST MEDICAL & SURGICAL HISTORY:  Bipolar disorder    Hypothyroidism    Chronic radicular low back pain    GERD    brain aneurysm ( s/p clipping in her 20s)    INTERVAL HISTORY: resting in bed in no distress  	  MEDICATIONS:  MEDICATIONS  (STANDING):  aspirin  chewable 81 milliGRAM(s) Oral daily  atorvastatin 40 milliGRAM(s) Oral at bedtime  enoxaparin Injectable 40 milliGRAM(s) SubCutaneous daily  glucagon  Injectable 1 milliGRAM(s) IntraMuscular once  levothyroxine 75 MICROGram(s) Oral daily  pantoprazole    Tablet 40 milliGRAM(s) Oral before breakfast    MEDICATIONS  (PRN):  melatonin 3 milliGRAM(s) Oral at bedtime PRN Insomnia  ondansetron Injectable 4 milliGRAM(s) IV Push every 8 hours PRN Nausea and/or Vomiting  oxyCODONE    IR 30 milliGRAM(s) Oral every 6 hours PRN Severe Pain (7 - 10)    Vitals:  T(F): 98.6 (12-08-21 @ 10:41), Max: 99.9 (12-07-21 @ 23:40)  HR: 67 (12-08-21 @ 10:41) (54 - 96)  BP: 101/68 (12-08-21 @ 10:41) (101/68 - 153/69)  RR: 18 (12-08-21 @ 10:41) (18 - 22)  SpO2: 96% (12-08-21 @ 10:41) (94% - 96%)    12-07 @ 07:01  -  12-08 @ 07:00  --------------------------------------------------------  IN:    Oral Fluid: 980 mL  Total IN: 980 mL    OUT:  Total OUT: 0 mL    Total NET: 980 mL    Weight (kg): 72.4 (12-07 @ 12:47)  BMI (kg/m2): 25.8 (12-07 @ 12:47)    PHYSICAL EXAM:  Neuro: Awake, responsive  CV: S1 S2 RRR  Lungs: CTABL  GI: Soft, BS +, ND, NT  Extremities: No edema    TELEMETRY: first degree     RADIOLOGY: < from: CT Brain Stroke Protocol (12.07.21 @ 06:04) >  Motion limited study. No gross evidence of acute intracranial hemorrhage, midline shift or CT evidence of acute territorial infarct.    If the patient's symptoms persist, consider short interval follow-up head CT or brain MRI if there are no MRI contraindications.    < end of copied text >    DIAGNOSTIC TESTING:    [p ] Echocardiogram:     LABS:	 	    CARDIAC MARKERS:  Troponin I, High Sensitivity Result: 6.8 ng/L (12-07 @ 06:27)    08 Dec 2021 07:13    141    |  107    |  17     ----------------------------<  93     3.4     |  27     |  0.77   07 Dec 2021 06:27    137    |  106    |  17     ----------------------------<  102    4.5     |  23     |  0.92     Ca    8.4        08 Dec 2021 07:13  Phos  3.9       08 Dec 2021 07:13  Mg     2.1       08 Dec 2021 07:13    TPro  6.6    /  Alb  3.3    /  TBili  0.6    /  DBili  x      /  AST  22     /  ALT  17     /  AlkPhos  63     08 Dec 2021 07:13                        12.8   6.55  )-----------( 192      ( 08 Dec 2021 07:13 )             38.5 ,                       13.3   4.72  )-----------( 185      ( 07 Dec 2021 06:27 )             39.5     TSH: Thyroid Stimulating Hormone, Serum: 8.390 uU/mL (12-07 @ 12:41)  INR: 1.07 ratio (12-07 @ 06:27)

## 2021-12-09 ENCOUNTER — TRANSCRIPTION ENCOUNTER (OUTPATIENT)
Age: 61
End: 2021-12-09

## 2021-12-09 PROCEDURE — 71045 X-RAY EXAM CHEST 1 VIEW: CPT | Mod: 26

## 2021-12-09 PROCEDURE — 99239 HOSP IP/OBS DSCHRG MGMT >30: CPT

## 2021-12-09 RX ORDER — LEVOTHYROXINE SODIUM 125 MCG
1 TABLET ORAL
Qty: 30 | Refills: 0
Start: 2021-12-09 | End: 2022-01-07

## 2021-12-09 RX ORDER — LEVOTHYROXINE SODIUM 125 MCG
1 TABLET ORAL
Qty: 0 | Refills: 0 | DISCHARGE

## 2021-12-09 RX ADMIN — Medication 81 MILLIGRAM(S): at 11:09

## 2021-12-09 RX ADMIN — DULOXETINE HYDROCHLORIDE 60 MILLIGRAM(S): 30 CAPSULE, DELAYED RELEASE ORAL at 11:09

## 2021-12-09 RX ADMIN — PANTOPRAZOLE SODIUM 40 MILLIGRAM(S): 20 TABLET, DELAYED RELEASE ORAL at 05:32

## 2021-12-09 RX ADMIN — Medication 88 MICROGRAM(S): at 05:32

## 2021-12-09 RX ADMIN — ENOXAPARIN SODIUM 40 MILLIGRAM(S): 100 INJECTION SUBCUTANEOUS at 11:09

## 2021-12-09 RX ADMIN — OXYCODONE HYDROCHLORIDE 30 MILLIGRAM(S): 5 TABLET ORAL at 05:35

## 2021-12-09 NOTE — DISCHARGE NOTE PROVIDER - HOSPITAL COURSE
This is a 61 year old female with a history of hypothyroidism, bipolar disorder, brain aneurysm s/p clipping in her 20s and chronic low back pain (scheduled for surgery with Dr. Larkin in Jan 2022 who presented to the ED on 12/07 with AMS and aphasia. Patient was seen banging the radiator cover and appear altered. In the ED, patient received benzo, etomidate and haldol for CT scan, without evidence of acute intracranial hemorrhage or infarct. CTA head/neck with patent intracranial circulation. Right supraclinoid aneurysm clip without evidence of recannulization. No intracranial aneurysm. Patent, ECAs, ICAs, no  hemodynamically significant stenosis at  ICA origins by NASCET criteria. Bilateral vertebral arteries are patent without flow limiting stenosis. Patient was evaluated by behavioral health, who determined that the patient has been off of her outpatient psychiatric medication regimen, including cymbalta and trazodone. In addition, it was likely that the patient unintentionally overused analgesics to combat worsening pain. Patient denies SI/HI/AVH, and was recommended to resume home medication. Over the course of hospital admission, patient has been alert with improved affect. Recommendations were made for MRI brain by neurology, but the patients prior clips from brain aneurysm are incompatible with MRI. Patient is to follow up with Dr Gillespie for psychiatric medication management; scheduled for spinal fusion surgery with Dr Larkin here at [facility] January 2022.

## 2021-12-09 NOTE — DISCHARGE NOTE NURSING/CASE MANAGEMENT/SOCIAL WORK - NSDCPEWEB_GEN_ALL_CORE
[facility] for Tobacco Control website --- [email or web address] website --- [email or web address]

## 2021-12-09 NOTE — DISCHARGE NOTE NURSING/CASE MANAGEMENT/SOCIAL WORK - NSDCPEFALRISK_GEN_ALL_CORE
For information on Fall & Injury Prevention, visit: [email or web address] OR  [email or web address] OR  https://www.cdc.gov/steadi/patient.html

## 2021-12-09 NOTE — PROGRESS NOTE ADULT - ASSESSMENT
60yo F with a PMH of hypothyroidism, bipolar disorder, chronic low back pain (scheduled for surgery  with Dr Larkin in Jan/2022), h/o brain aneurysm ( s/p clipping in her 20s), GERD present to ED with AMS, neighbor heard banging noise coming from patient's apartment. Patient was seen banging the radiatory cover and appear altered. The only thing patient would response to the neighbor was " no...no" and did not recognizer her. Per neighbor, patient was admitted to a psychiatric hospital several years ago for cutting herself, has since then stable and is following with psych outpatient.     In the ED, he received benzo, etomidate and haldol for CT scan.   ECG:  sinus marcelino 49bpm; long 1st degree AVB  Labs otherwise unremarkable.  CT head with motion limited study but no gross evidence of acute intracranial hemorrhage or infarct. CTA head/neck with patent intracranial circulation. Right supraclinoid aneurysm clip without evidence of recannulization. No intracranial aneurysm. Patent, ECAs, ICAs, no  hemodynamically significant stenosis at  ICA origins by NASCET criteria. Bilateral vertebral arteries are patent without flow limiting stenosisCT perfusion show areas of elevated Tmax do not follow a vascular distribution and are likely artifactual in nature.    -monitor on tele  -Neuro following,  planning for MRI brain if brainr clips compatible   -cont asa/statin  -TSH 8.390 with total T3 61 ( normal ),  synthroid dose adjusted  -avoid all AV gonzales blocking agents,  HR 60-70s first degree AV block, in 40s -50s overnight  -2D echo unremarkable    62yo F with a PMH of hypothyroidism, bipolar disorder, chronic low back pain (scheduled for surgery  with Dr Larkin in Jan/2022), h/o brain aneurysm ( s/p clipping in her 20s), GERD present to ED with AMS, neighbor heard banging noise coming from patient's apartment. Patient was seen banging the radiatory cover and appear altered. The only thing patient would response to the neighbor was " no...no" and did not recognizer her. Per neighbor, patient was admitted to a psychiatric hospital several years ago for cutting herself, has since then stable and is following with psych outpatient.     In the ED, he received benzo, etomidate and haldol for CT scan.   ECG:  sinus marcelino 49bpm; long 1st degree AVB  Labs otherwise unremarkable.  CT head with motion limited study but no gross evidence of acute intracranial hemorrhage or infarct. CTA head/neck with patent intracranial circulation. Right supraclinoid aneurysm clip without evidence of recannulization. No intracranial aneurysm. Patent, ECAs, ICAs, no  hemodynamically significant stenosis at  ICA origins by NASCET criteria. Bilateral vertebral arteries are patent without flow limiting stenosisCT perfusion show areas of elevated Tmax do not follow a vascular distribution and are likely artifactual in nature.    -sinus on tele first degree AV block 60-70s, in 50s while asleep  -Neuro following, planning for MRI brain if brainr clips compatible   -cont asa/statin  -TSH 8.390 with total T3 61 ( normal ),  synthroid dose adjusted  -avoid all AV gonzales blocking agents  -2D echo unremarkable

## 2021-12-09 NOTE — DISCHARGE NOTE PROVIDER - NSDCFUADDAPPT_GEN_ALL_CORE_FT
Patient is to follow up with Dr Gillespie for psychiatric medication management; scheduled for spinal fusion surgery with Dr Larkin here at [facility] January 2022.

## 2021-12-09 NOTE — DISCHARGE NOTE NURSING/CASE MANAGEMENT/SOCIAL WORK - PATIENT PORTAL LINK FT
You can access the FollowMyHealth Patient Portal offered by [facility] by registering at the following website: [email or web address]. By joining [employer] FollowMyHealth portal, you will also be able to view your health information using other applications (apps) compatible with our system.

## 2021-12-09 NOTE — PROGRESS NOTE ADULT - SUBJECTIVE AND OBJECTIVE BOX
Patient is a 61y old  Female who presents with AMS (08 Dec 2021 11:10)    PAST MEDICAL & SURGICAL HISTORY:  Bipolar disorder    Hypothyroidism    Chronic radicular low back pain    GERD    brain aneurysm ( s/p clipping in her 20s)    INTERVAL HISTORY:  	  MEDICATIONS:  MEDICATIONS  (STANDING):  aspirin  chewable 81 milliGRAM(s) Oral daily  atorvastatin 40 milliGRAM(s) Oral at bedtime  DULoxetine 60 milliGRAM(s) Oral daily  enoxaparin Injectable 40 milliGRAM(s) SubCutaneous daily  levothyroxine 88 MICROGram(s) Oral daily  pantoprazole    Tablet 40 milliGRAM(s) Oral before breakfast  traZODone 50 milliGRAM(s) Oral at bedtime    MEDICATIONS  (PRN):  melatonin 3 milliGRAM(s) Oral at bedtime PRN Insomnia  ondansetron Injectable 4 milliGRAM(s) IV Push every 8 hours PRN Nausea and/or Vomiting  oxyCODONE    IR 30 milliGRAM(s) Oral every 6 hours PRN Severe Pain (7 - 10)    Vitals:  T(F): 98.3 (12-09-21 @ 05:35), Max: 98.6 (12-08-21 @ 10:41)  HR: 57 (12-09-21 @ 05:35) (55 - 76)  BP: 139/84 (12-09-21 @ 05:35) (101/68 - 139/84)  RR: 18 (12-09-21 @ 05:35) (18 - 18)  SpO2: 96% (12-09-21 @ 05:35) (96% - 98%)    Weight (kg): 72.4 (12-07 @ 12:47)  BMI (kg/m2): 25.8 (12-07 @ 12:47)    PHYSICAL EXAM:  Neuro: Awake, responsive  CV: S1 S2 RRR  Lungs: CTABL  GI: Soft, BS +, ND, NT  Extremities: No edema    TELEMETRY: Sinus first degree      RADIOLOGY: < from: CT Brain Stroke Protocol (12.07.21 @ 06:04) >  Motion limited study. No gross evidence of acute intracranial hemorrhage, midline shift or CT evidence of acute territorial infarct.    If the patient's symptoms persist, consider short interval follow-up head CT or brain MRI if there are no MRI contraindications.    < end of copied text >    DIAGNOSTIC TESTING:    [x ] Echocardiogram: < from: TTE Echo Complete w/o Contrast w/ Doppler (12.08.21 @ 11:55) >  Left Ventricle: Normal left ventricular size and wall thicknesses, with   normal systolic and diastolic function.  Global LV systolic function was normal. Left ventricular ejection   fraction, by visual estimation, is 60 to 65%.  Right Ventricle: Normal right ventricular size and function.  Left Atrium: The left atrium is normal in size.  Right Atrium: The right atrium is normal in size.  Pericardium: There is no evidence of pericardial effusion.  Mitral Valve: Thickening of the anterior and posterior mitral valve   leaflets. No evidence of mitral valve regurgitation is seen.  Tricuspid Valve: Structurally normal tricuspid valve, with normal leaflet   excursion. Mild tricuspid regurgitation is visualized.  Aortic Valve: Normal trileaflet aortic valve with normal opening. No   evidence of aortic valve regurgitation is seen.  Pulmonic Valve: Structurally normal pulmonic valve, with normal leaflet   excursion. Trace pulmonic valve regurgitation.  Aorta: The aortic root and ascending aorta are structurally normal, with   no evidence of dilitation.  Pulmonary Artery: The main pulmonary artery is normal in size.      Summary:   1. Left ventricular ejection fraction, by visual estimation, is 60 to   65%.   2. Normal global left ventricular systolic function.   3. No evidence of mitral valve regurgitation.   4. Thickening of the anterior and posterior mitral valve leaflets.   5. Mild tricuspid regurgitation.   6. No evidence of any thrombus.    < end of copied text >    LABS:	 	    CARDIAC MARKERS:  Troponin I, High Sensitivity Result: 6.8 ng/L (12-07 @ 06:27)    08 Dec 2021 07:13    141    |  107    |  17     ----------------------------<  93     3.4     |  27     |  0.77   07 Dec 2021 06:27    137    |  106    |  17     ----------------------------<  102    4.5     |  23     |  0.92     Ca    8.4        08 Dec 2021 07:13  Phos  3.9       08 Dec 2021 07:13  Mg     2.1       08 Dec 2021 07:13    TPro  6.6    /  Alb  x      /  TBili  x      /  DBili  x      /  AST  x      /  ALT  x      /  AlkPhos  x      08 Dec 2021 16:34                        12.8   6.55  )-----------( 192      ( 08 Dec 2021 07:13 )             38.5 ,                       13.3   4.72  )-----------( 185      ( 07 Dec 2021 06:27 )             39.5     Lipid Profile: 12-08 @ 11:50  HDL/Total Cholesterol: --  HDL Chol:              74 mg/dL  Serum Chol:            194 mg/dL  Direct LDL:            --  Triglycerides:         53 mg/dL    TSH: Thyroid Stimulating Hormone, Serum: 8.390 uU/mL (12-07 @ 12:41)    INR: 1.07 ratio (12-07 @ 06:27)           Patient is a 61y old  Female who presents with AMS (08 Dec 2021 11:10)    PAST MEDICAL & SURGICAL HISTORY:  Bipolar disorder    Hypothyroidism    Chronic radicular low back pain    GERD    brain aneurysm ( s/p clipping in her 20s)    INTERVAL HISTORY: resting comfortably in no distress   	  MEDICATIONS:  MEDICATIONS  (STANDING):  aspirin  chewable 81 milliGRAM(s) Oral daily  atorvastatin 40 milliGRAM(s) Oral at bedtime  DULoxetine 60 milliGRAM(s) Oral daily  enoxaparin Injectable 40 milliGRAM(s) SubCutaneous daily  levothyroxine 88 MICROGram(s) Oral daily  pantoprazole    Tablet 40 milliGRAM(s) Oral before breakfast  traZODone 50 milliGRAM(s) Oral at bedtime    MEDICATIONS  (PRN):  melatonin 3 milliGRAM(s) Oral at bedtime PRN Insomnia  ondansetron Injectable 4 milliGRAM(s) IV Push every 8 hours PRN Nausea and/or Vomiting  oxyCODONE    IR 30 milliGRAM(s) Oral every 6 hours PRN Severe Pain (7 - 10)    Vitals:  T(F): 98.3 (12-09-21 @ 05:35), Max: 98.6 (12-08-21 @ 10:41)  HR: 57 (12-09-21 @ 05:35) (55 - 76)  BP: 139/84 (12-09-21 @ 05:35) (101/68 - 139/84)  RR: 18 (12-09-21 @ 05:35) (18 - 18)  SpO2: 96% (12-09-21 @ 05:35) (96% - 98%)    Weight (kg): 72.4 (12-07 @ 12:47)  BMI (kg/m2): 25.8 (12-07 @ 12:47)    PHYSICAL EXAM:  Neuro: Awake, responsive  CV: S1 S2 RRR  Lungs: CTABL  GI: Soft, BS +, ND, NT  Extremities: No edema    TELEMETRY: Sinus first degree      RADIOLOGY: < from: CT Brain Stroke Protocol (12.07.21 @ 06:04) >  Motion limited study. No gross evidence of acute intracranial hemorrhage, midline shift or CT evidence of acute territorial infarct.    If the patient's symptoms persist, consider short interval follow-up head CT or brain MRI if there are no MRI contraindications.    < end of copied text >    DIAGNOSTIC TESTING:    [x ] Echocardiogram: < from: TTE Echo Complete w/o Contrast w/ Doppler (12.08.21 @ 11:55) >  Left Ventricle: Normal left ventricular size and wall thicknesses, with   normal systolic and diastolic function.  Global LV systolic function was normal. Left ventricular ejection   fraction, by visual estimation, is 60 to 65%.  Right Ventricle: Normal right ventricular size and function.  Left Atrium: The left atrium is normal in size.  Right Atrium: The right atrium is normal in size.  Pericardium: There is no evidence of pericardial effusion.  Mitral Valve: Thickening of the anterior and posterior mitral valve   leaflets. No evidence of mitral valve regurgitation is seen.  Tricuspid Valve: Structurally normal tricuspid valve, with normal leaflet   excursion. Mild tricuspid regurgitation is visualized.  Aortic Valve: Normal trileaflet aortic valve with normal opening. No   evidence of aortic valve regurgitation is seen.  Pulmonic Valve: Structurally normal pulmonic valve, with normal leaflet   excursion. Trace pulmonic valve regurgitation.  Aorta: The aortic root and ascending aorta are structurally normal, with   no evidence of dilitation.  Pulmonary Artery: The main pulmonary artery is normal in size.      Summary:   1. Left ventricular ejection fraction, by visual estimation, is 60 to   65%.   2. Normal global left ventricular systolic function.   3. No evidence of mitral valve regurgitation.   4. Thickening of the anterior and posterior mitral valve leaflets.   5. Mild tricuspid regurgitation.   6. No evidence of any thrombus.    < end of copied text >    LABS:	 	    CARDIAC MARKERS:  Troponin I, High Sensitivity Result: 6.8 ng/L (12-07 @ 06:27)    08 Dec 2021 07:13    141    |  107    |  17     ----------------------------<  93     3.4     |  27     |  0.77   07 Dec 2021 06:27    137    |  106    |  17     ----------------------------<  102    4.5     |  23     |  0.92     Ca    8.4        08 Dec 2021 07:13  Phos  3.9       08 Dec 2021 07:13  Mg     2.1       08 Dec 2021 07:13    TPro  6.6    /  Alb  x      /  TBili  x      /  DBili  x      /  AST  x      /  ALT  x      /  AlkPhos  x      08 Dec 2021 16:34                        12.8   6.55  )-----------( 192      ( 08 Dec 2021 07:13 )             38.5 ,                       13.3   4.72  )-----------( 185      ( 07 Dec 2021 06:27 )             39.5     Lipid Profile: 12-08 @ 11:50  HDL/Total Cholesterol: --  HDL Chol:              74 mg/dL  Serum Chol:            194 mg/dL  Direct LDL:            --  Triglycerides:         53 mg/dL    TSH: Thyroid Stimulating Hormone, Serum: 8.390 uU/mL (12-07 @ 12:41)    INR: 1.07 ratio (12-07 @ 06:27)

## 2021-12-09 NOTE — DISCHARGE NOTE PROVIDER - NSDCMRMEDTOKEN_GEN_ALL_CORE_FT
clonazePAM 1 mg oral tablet: 1 tab(s) orally 3 times a day, As Needed  Cymbalta 60 mg oral delayed release capsule: 1 cap(s) orally once a day  diclofenac sodium 100 mg oral tablet, extended release: 1 tab(s) orally once a day  levothyroxine 88 mcg (0.088 mg) oral tablet: 1 tab(s) orally once a day  omeprazole 40 mg oral delayed release capsule: 1 cap(s) orally once a day  oxyCODONE 30 mg oral tablet: 1 tab(s) orally every 6 hours, As Needed  traZODone 50 mg oral tablet:  orally once a day  Zanaflex 4 mg oral capsule: 1 cap(s) orally 3 times a day, As Needed

## 2021-12-09 NOTE — DISCHARGE NOTE NURSING/CASE MANAGEMENT/SOCIAL WORK - NSDCPEEMAIL_GEN_ALL_CORE
[facility] email [email or web address]

## 2021-12-10 VITALS
DIASTOLIC BLOOD PRESSURE: 68 MMHG | SYSTOLIC BLOOD PRESSURE: 108 MMHG | HEART RATE: 65 BPM | OXYGEN SATURATION: 94 % | RESPIRATION RATE: 18 BRPM | TEMPERATURE: 98 F

## 2021-12-10 LAB — ANA TITR SER: NEGATIVE — SIGNIFICANT CHANGE UP

## 2021-12-11 LAB — COPPER SERPL-MCNC: 109 UG/DL — SIGNIFICANT CHANGE UP (ref 80–158)

## 2021-12-12 LAB
% ALBUMIN: 65.9 % — SIGNIFICANT CHANGE UP
% ALPHA 1: 4 % — SIGNIFICANT CHANGE UP
% ALPHA 2: 8.5 % — SIGNIFICANT CHANGE UP
% BETA: 9.8 % — SIGNIFICANT CHANGE UP
% GAMMA: 11.8 % — SIGNIFICANT CHANGE UP
ALBUMIN SERPL ELPH-MCNC: 4.3 G/DL — SIGNIFICANT CHANGE UP (ref 3.6–5.5)
ALBUMIN/GLOB SERPL ELPH: 1.9 RATIO — SIGNIFICANT CHANGE UP
ALPHA1 GLOB SERPL ELPH-MCNC: 0.3 G/DL — SIGNIFICANT CHANGE UP (ref 0.1–0.4)
ALPHA2 GLOB SERPL ELPH-MCNC: 0.6 G/DL — SIGNIFICANT CHANGE UP (ref 0.5–1)
B-GLOBULIN SERPL ELPH-MCNC: 0.6 G/DL — SIGNIFICANT CHANGE UP (ref 0.5–1)
GAMMA GLOBULIN: 0.8 G/DL — SIGNIFICANT CHANGE UP (ref 0.6–1.6)
PROT PATTERN SERPL ELPH-IMP: SIGNIFICANT CHANGE UP
ZINC SERPL-MCNC: 68 UG/DL — SIGNIFICANT CHANGE UP (ref 44–115)

## 2021-12-15 LAB — METHYLMALONATE SERPL-SCNC: 337 NMOL/L — SIGNIFICANT CHANGE UP (ref 0–378)

## 2022-03-27 PROBLEM — E03.9 HYPOTHYROIDISM, UNSPECIFIED: Chronic | Status: ACTIVE | Noted: 2021-12-07

## 2022-03-27 PROBLEM — F31.9 BIPOLAR DISORDER, UNSPECIFIED: Chronic | Status: ACTIVE | Noted: 2021-12-07

## 2022-03-27 PROBLEM — M54.16 RADICULOPATHY, LUMBAR REGION: Chronic | Status: ACTIVE | Noted: 2021-12-07

## 2022-04-01 ENCOUNTER — OUTPATIENT (OUTPATIENT)
Dept: OUTPATIENT SERVICES | Facility: HOSPITAL | Age: 62
LOS: 1 days | Discharge: HOME | End: 2022-04-01
Payer: MEDICARE

## 2022-04-01 VITALS
RESPIRATION RATE: 17 BRPM | OXYGEN SATURATION: 98 % | HEIGHT: 66 IN | WEIGHT: 157.85 LBS | DIASTOLIC BLOOD PRESSURE: 83 MMHG | TEMPERATURE: 98 F | SYSTOLIC BLOOD PRESSURE: 116 MMHG | HEART RATE: 75 BPM

## 2022-04-01 DIAGNOSIS — Z01.818 ENCOUNTER FOR OTHER PREPROCEDURAL EXAMINATION: ICD-10-CM

## 2022-04-01 DIAGNOSIS — M62.830 MUSCLE SPASM OF BACK: ICD-10-CM

## 2022-04-01 DIAGNOSIS — E03.9 HYPOTHYROIDISM, UNSPECIFIED: ICD-10-CM

## 2022-04-01 DIAGNOSIS — K22.70 BARRETT'S ESOPHAGUS WITHOUT DYSPLASIA: ICD-10-CM

## 2022-04-01 DIAGNOSIS — Z87.39 PERSONAL HISTORY OF OTHER DISEASES OF THE MUSCULOSKELETAL SYSTEM AND CONNECTIVE TISSUE: ICD-10-CM

## 2022-04-01 DIAGNOSIS — F41.9 ANXIETY DISORDER, UNSPECIFIED: ICD-10-CM

## 2022-04-01 DIAGNOSIS — M25.551 PAIN IN RIGHT HIP: ICD-10-CM

## 2022-04-01 DIAGNOSIS — M16.11 UNILATERAL PRIMARY OSTEOARTHRITIS, RIGHT HIP: ICD-10-CM

## 2022-04-01 LAB
A1C WITH ESTIMATED AVERAGE GLUCOSE RESULT: 4.8 % — SIGNIFICANT CHANGE UP (ref 4–5.6)
ANION GAP SERPL CALC-SCNC: 3 MMOL/L — LOW (ref 5–17)
APTT BLD: 46.4 SEC — HIGH (ref 27.5–35.5)
BLD GP AB SCN SERPL QL: SIGNIFICANT CHANGE UP
BUN SERPL-MCNC: 21 MG/DL — SIGNIFICANT CHANGE UP (ref 7–23)
CALCIUM SERPL-MCNC: 10.2 MG/DL — HIGH (ref 8.5–10.1)
CHLORIDE SERPL-SCNC: 109 MMOL/L — HIGH (ref 96–108)
CO2 SERPL-SCNC: 31 MMOL/L — SIGNIFICANT CHANGE UP (ref 22–31)
CREAT SERPL-MCNC: 1 MG/DL — SIGNIFICANT CHANGE UP (ref 0.5–1.3)
EGFR: 64 ML/MIN/1.73M2 — SIGNIFICANT CHANGE UP
ESTIMATED AVERAGE GLUCOSE: 91 MG/DL — SIGNIFICANT CHANGE UP (ref 68–114)
GLUCOSE SERPL-MCNC: 86 MG/DL — SIGNIFICANT CHANGE UP (ref 70–99)
HCT VFR BLD CALC: 42.4 % — SIGNIFICANT CHANGE UP (ref 34.5–45)
HGB BLD-MCNC: 14.1 G/DL — SIGNIFICANT CHANGE UP (ref 11.5–15.5)
INR BLD: 0.94 RATIO — SIGNIFICANT CHANGE UP (ref 0.88–1.16)
MCHC RBC-ENTMCNC: 31.1 PG — SIGNIFICANT CHANGE UP (ref 27–34)
MCHC RBC-ENTMCNC: 33.3 G/DL — SIGNIFICANT CHANGE UP (ref 32–36)
MCV RBC AUTO: 93.6 FL — SIGNIFICANT CHANGE UP (ref 80–100)
MRSA PCR RESULT.: SIGNIFICANT CHANGE UP
NRBC # BLD: 0 /100 WBCS — SIGNIFICANT CHANGE UP (ref 0–0)
PLATELET # BLD AUTO: 183 K/UL — SIGNIFICANT CHANGE UP (ref 150–400)
POTASSIUM SERPL-MCNC: 4.2 MMOL/L — SIGNIFICANT CHANGE UP (ref 3.5–5.3)
POTASSIUM SERPL-SCNC: 4.2 MMOL/L — SIGNIFICANT CHANGE UP (ref 3.5–5.3)
PROTHROM AB SERPL-ACNC: 11.3 SEC — SIGNIFICANT CHANGE UP (ref 10.5–13.4)
RBC # BLD: 4.53 M/UL — SIGNIFICANT CHANGE UP (ref 3.8–5.2)
RBC # FLD: 12.7 % — SIGNIFICANT CHANGE UP (ref 10.3–14.5)
S AUREUS DNA NOSE QL NAA+PROBE: SIGNIFICANT CHANGE UP
SODIUM SERPL-SCNC: 143 MMOL/L — SIGNIFICANT CHANGE UP (ref 135–145)
WBC # BLD: 8.93 K/UL — SIGNIFICANT CHANGE UP (ref 3.8–10.5)
WBC # FLD AUTO: 8.93 K/UL — SIGNIFICANT CHANGE UP (ref 3.8–10.5)

## 2022-04-01 PROCEDURE — 93010 ELECTROCARDIOGRAM REPORT: CPT

## 2022-04-01 NOTE — H&P PST ADULT - HISTORY OF PRESENT ILLNESS
61 year old male presents to PST.  Patient has a PMHX of hypothyroidism, Migraine Cesar Esophagus anxiety/depression and pain. Patient presents with Right hip pain 2/2 right Hip osteoarthritis and has a scheduled Right total Hip Replacement planned on 4/19/2022 with Dr. Cortez.    Covid Vaccination completed pfizer plus booster 11/2021.  Covid swab will be scheduled 3 days prior to surgery      Patient denies any flu like symptoms, cough, SOB, or any recent travel outside the [place] in the past 30 days.   61 year old male presents to PST.  Patient has a PMHX of hypothyroidism, Migraine Cesar Esophagus anxiety/depression and chonic pain. Patient presents with Right hip pain 2/2 right Hip osteoarthritis and has a scheduled Right total Hip Replacement planned on 4/19/2022 with Dr. Cortez.    Covid Vaccination completed pfizer plus booster 11/2021.  Covid swab will be scheduled 3 days prior to surgery      Patient denies any flu like symptoms, cough, SOB, or any recent travel outside the [place] in the past 30 days.

## 2022-04-01 NOTE — OCCUPATIONAL THERAPY INITIAL EVALUATION ADULT - ANTICIPATED DISCHARGE DISPOSITION, OT EVAL
Home with home OT for home safety evaluation and to improve functional ADLs and transfers/mobility. Pt prefers to go to short term rehab.

## 2022-04-01 NOTE — OCCUPATIONAL THERAPY INITIAL EVALUATION ADULT - PERTINENT HX OF CURRENT PROBLEM, REHAB EVAL
R hip OA which impacts pts ability to perform functional tasks/transfers and mobility. Pt is scheduled for R THR anterior approach on 4/19/22.

## 2022-04-01 NOTE — H&P PST ADULT - ASSESSMENT
61 year old male presents to PST.  Patient has a PMHX of hypothyroidism, Migraine Cesar Esophagus anxiety/depression and pain. Patient presents with Right hip pain 2/2 right Hip osteoarthritis and has a scheduled Right total Hip Replacement planned on 2022 with Dr. Cortez.    STEPHYI SCORE [CLOT]    AGE RELATED RISK FACTORS                                                       MOBILITY RELATED FACTORS  [ ] Age 41-60 years                                            (1 Point)                  [ ] Bed rest                                                        (1 Point)  [ ] Age: 61-74 years                                           (2 Points)                 [ ] Plaster cast                                                   (2 Points)  [ ] Age= 75 years                                              (3 Points)                 [ ] Bed bound for more than 72 hours                 (2 Points)    DISEASE RELATED RISK FACTORS                                               GENDER SPECIFIC FACTORS  [ ] Edema in the lower extremities                       (1 Point)                  [ ] Pregnancy                                                     (1 Point)  [ ] Varicose veins                                               (1 Point)                  [ ] Post-partum < 6 weeks                                   (1 Point)             [ ] BMI > 25 Kg/m2                                            (1 Point)                  [ ] Hormonal therapy  or oral contraception          (1 Point)                 [ ] Sepsis (in the previous month)                        (1 Point)                  [ ] History of pregnancy complications                 (1 point)  [ ] Pneumonia or serious lung disease                                               [ ] Unexplained or recurrent                     (1 Point)           (in the previous month)                               (1 Point)  [ ] Abnormal pulmonary function test                     (1 Point)                 SURGERY RELATED RISK FACTORS  [ ] Acute myocardial infarction                              (1 Point)                 [ ]  Section                                             (1 Point)  [ ] Congestive heart failure (in the previous month)  (1 Point)               [ ] Minor surgery                                                  (1 Point)   [ ] Inflammatory bowel disease                             (1 Point)                 [ ] Arthroscopic surgery                                        (2 Points)  [ ] Central venous access                                      (2 Points)                [ ] General surgery lasting more than 45 minutes   (2 Points)       [ ] Stroke (in the previous month)                          (5 Points)               [ ] Elective arthroplasty                                         (5 Points)                                                                                                                                               HEMATOLOGY RELATED FACTORS                                                 TRAUMA RELATED RISK FACTORS  [ ] Prior episodes of VTE                                     (3 Points)                [ ] Fracture of the hip, pelvis, or leg                       (5 Points)  [ ] Positive family history for VTE                         (3 Points)                 [ ] Acute spinal cord injury (in the previous month)  (5 Points)  [ ] Prothrombin 07035 A                                     (3 Points)                 [ ] Paralysis  (less than 1 month)                             (5 Points)  [ ] Factor V Leiden                                             (3 Points)                  [ ] Multiple Trauma within 1 month                        (5 Points)  [ ] Lupus anticoagulants                                     (3 Points)                                                           [ ] Anticardiolipin antibodies                               (3 Points)                                                       [ ] High homocysteine in the blood                      (3 Points)                                             [ ] Other congenital or acquired thrombophilia      (3 Points)                                                [ ] Heparin induced thrombocytopenia                  (3 Points)                                          Total Score [       7   ]    Caprini Score 0 - 2:  Low Risk, No VTE Prophylaxis required for most patients, encourage ambulation  Caprini Score 3 - 6:  At Risk, pharmacologic VTE prophylaxis is indicated for most patients (in the absence of a contraindication)  Caprini Score Greater than or = 7:  High Risk, pharmacologic VTE prophylaxis is indicated for most patients (in the absence of a contraindication)

## 2022-04-01 NOTE — H&P PST ADULT - PROBLEM SELECTOR PLAN 1
labs - cbc, pt/ptt, BMP, Type and screen, Hemoglbin A1c, EKG,     Medical clearance, neurology note/clearance, and pain management recommendations  preop 3 day hibiclens instruction reviewed and given. instructed on if nose cx positive use mupirocin 5 days and checklist given   take routine meds DOS with sips of water. avoid NSAID and OTC supplements. verbalized understanding   information on proper nutrition, increase protein and better food choices provided in packet   ensure clear  patient instructed on having covid19 swab 3 days prior to surgery   anesthesiologist to review pst labs, ekg, medical clearances and optimization for surgery

## 2022-04-01 NOTE — PHYSICAL THERAPY INITIAL EVALUATION ADULT - DIAGNOSIS, PT EVAL
difficulty walking, decreased strength, decreased balance
Pulses equal bilaterally, no edema present.

## 2022-04-01 NOTE — H&P PST ADULT - NSANTHOSAYNRD_GEN_A_CORE
No. ALLEGRA screening performed.  STOP BANG Legend: 0-2 = LOW Risk; 3-4 = INTERMEDIATE Risk; 5-8 = HIGH Risk

## 2022-04-01 NOTE — H&P PST ADULT - NSICDXPASTMEDICALHX_GEN_ALL_CORE_FT
PAST MEDICAL HISTORY:  Arthritis of right hip     Bipolar disorder     Chronic radicular low back pain     Hypothyroidism

## 2022-04-01 NOTE — PHYSICAL THERAPY INITIAL EVALUATION ADULT - ADDITIONAL COMMENTS
Pt lives with her friend (whom can provide assist upon D/C home) in a private home, 5 entry steps c B/L rails(far apart), all amenities on the 1st floor.  Pt has a tub/shower combo with a fixed shower head, standard toilet seat height, & + grab bar. Pt states she is currently independent with all functional mobility including community ambulation without device. Pt has own rolling walker(in good working condition & easily accessible). Pt states she is independent with ADL's as well. Pt reports daily 5/10 pain at rest & states it is worse with any activity 10/10. Pt is right hand dominant, wears eye glasses, drives, & is out of work on disability.  Pt reports she has the most difficulty time "getting up & moving around" after prolonged sitting. Pt endorses taking oxycodone for pain management. Goal of therapy: manage pain & improve functional mobility.

## 2022-04-01 NOTE — PHYSICAL THERAPY INITIAL EVALUATION ADULT - PERTINENT HX OF CURRENT PROBLEM, REHAB EVAL
Patient attends pre-op testing today following consult c Dr. Cortez due to chronic pain to right hip. Elective R MEGAN(anterior approach) is now scheduled in this facility for 4/19/2022.

## 2022-04-06 PROBLEM — M16.11 UNILATERAL PRIMARY OSTEOARTHRITIS, RIGHT HIP: Chronic | Status: ACTIVE | Noted: 2022-04-01

## 2022-04-19 ENCOUNTER — APPOINTMENT (OUTPATIENT)
Dept: ORTHOPEDIC SURGERY | Facility: HOSPITAL | Age: 62
End: 2022-04-19

## 2022-04-20 ENCOUNTER — APPOINTMENT (OUTPATIENT)
Dept: NEUROSURGERY | Facility: CLINIC | Age: 62
End: 2022-04-20
Payer: MEDICARE

## 2022-04-20 VITALS
HEIGHT: 66 IN | OXYGEN SATURATION: 92 % | HEART RATE: 64 BPM | SYSTOLIC BLOOD PRESSURE: 126 MMHG | DIASTOLIC BLOOD PRESSURE: 87 MMHG | BODY MASS INDEX: 26.03 KG/M2 | WEIGHT: 162 LBS | TEMPERATURE: 98 F

## 2022-04-20 DIAGNOSIS — Z87.891 PERSONAL HISTORY OF NICOTINE DEPENDENCE: ICD-10-CM

## 2022-04-20 DIAGNOSIS — I67.1 CEREBRAL ANEURYSM, NONRUPTURED: ICD-10-CM

## 2022-04-20 PROCEDURE — 99204 OFFICE O/P NEW MOD 45 MIN: CPT

## 2022-04-20 NOTE — ASSESSMENT
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 Chencho Maxwell. Neurologist: Dr. Melina Redmond. \par \par Patient is planned for elective hip surgery and we are asked for clearance given her history of SAH. The patient has not had any follow up with her surgeon since rupture but has not had any issues outside of typical migraines. The only imaging studies are CTAs which are extremely limited in terms of assessing for aneurysm recurrence due to artifact from the clip. I therefore recommend a formal angiogram to make sure the aneurysm is completely treated prior to proceeding with anesthesia and hip replacement. \par \par Educated patient on formal follow up protocol s/p aneurysm treatment.\par Recommend diagnostic cerebral angiogram as the initial step to evaluate prior aneurysm clipping and for other aneurysms. The risks, benefits, alternatives, complications and personnel associated with the procedure were discussed with the patient and family in great detail. They request that we proceed. \par \par \par Plan:\par PST and COVID PCR\par Diagnostic cerebral angiogram 4/28/2022\par Should be cleared for hip replacement after angio if the study looks good \par

## 2022-04-20 NOTE — HISTORY OF PRESENT ILLNESS
[de-identified] : Marisol Yanez is a 61 year old female with PMH of HTN, GERD, hypothyroidism, Barretts esophagus, bipolar disorder, depression, migraines, osteoarthritis, chronic low back pain, lumbar radiculopathy, PSH of right supraclinoid aneurysm clipping by Dr. Chencho Maxwell in March 1987 s/p aneurysm rupture. Had a follow up cerebral angiogram right after surgery. Has not followed up with Dr. Maxwell since as per patient. Presented to [facility] with AMS 12/7/21, CTH, CTA head and neck done, no gross evidence of acute intracranial hemorrhage, mass effect or midline shift. No CT evidence of acute large territory vascular infarct. The ventricles and cortical sulci are within normal limits for age. S/p fall, associated pain in right hip, plans to get right anterior total hip replacement with orthopaedic surgeon Dr. Rigoberto Cortez. Followed up with neurologist, Dr. Melina Redmond.

## 2022-04-25 ENCOUNTER — OUTPATIENT (OUTPATIENT)
Dept: OUTPATIENT SERVICES | Facility: HOSPITAL | Age: 62
LOS: 1 days | End: 2022-04-25
Payer: MEDICARE

## 2022-04-25 DIAGNOSIS — Z11.52 ENCOUNTER FOR SCREENING FOR COVID-19: ICD-10-CM

## 2022-04-25 LAB — SARS-COV-2 RNA SPEC QL NAA+PROBE: SIGNIFICANT CHANGE UP

## 2022-04-25 PROCEDURE — U0003: CPT

## 2022-04-25 PROCEDURE — U0005: CPT

## 2022-04-25 PROCEDURE — C9803: CPT

## 2022-04-26 ENCOUNTER — OUTPATIENT (OUTPATIENT)
Dept: OUTPATIENT SERVICES | Facility: HOSPITAL | Age: 62
LOS: 1 days | End: 2022-04-26
Payer: MEDICARE

## 2022-04-26 VITALS
OXYGEN SATURATION: 97 % | TEMPERATURE: 98 F | SYSTOLIC BLOOD PRESSURE: 147 MMHG | DIASTOLIC BLOOD PRESSURE: 85 MMHG | RESPIRATION RATE: 16 BRPM | HEART RATE: 58 BPM | WEIGHT: 164.91 LBS | HEIGHT: 66 IN

## 2022-04-26 DIAGNOSIS — Z98.890 OTHER SPECIFIED POSTPROCEDURAL STATES: Chronic | ICD-10-CM

## 2022-04-26 DIAGNOSIS — Z86.79 PERSONAL HISTORY OF OTHER DISEASES OF THE CIRCULATORY SYSTEM: Chronic | ICD-10-CM

## 2022-04-26 DIAGNOSIS — F41.9 ANXIETY DISORDER, UNSPECIFIED: ICD-10-CM

## 2022-04-26 DIAGNOSIS — Z01.818 ENCOUNTER FOR OTHER PREPROCEDURAL EXAMINATION: ICD-10-CM

## 2022-04-26 DIAGNOSIS — Z87.19 PERSONAL HISTORY OF OTHER DISEASES OF THE DIGESTIVE SYSTEM: ICD-10-CM

## 2022-04-26 DIAGNOSIS — M54.16 RADICULOPATHY, LUMBAR REGION: ICD-10-CM

## 2022-04-26 DIAGNOSIS — I67.1 CEREBRAL ANEURYSM, NONRUPTURED: ICD-10-CM

## 2022-04-26 DIAGNOSIS — I67.1 CEREBRAL ANEURYSM, NONRUPTURED: Chronic | ICD-10-CM

## 2022-04-26 DIAGNOSIS — E03.9 HYPOTHYROIDISM, UNSPECIFIED: ICD-10-CM

## 2022-04-26 LAB
BLD GP AB SCN SERPL QL: NEGATIVE — SIGNIFICANT CHANGE UP
RH IG SCN BLD-IMP: POSITIVE — SIGNIFICANT CHANGE UP

## 2022-04-26 PROCEDURE — 86850 RBC ANTIBODY SCREEN: CPT

## 2022-04-26 PROCEDURE — 86901 BLOOD TYPING SEROLOGIC RH(D): CPT

## 2022-04-26 PROCEDURE — G0463: CPT

## 2022-04-26 PROCEDURE — 86900 BLOOD TYPING SEROLOGIC ABO: CPT

## 2022-04-26 RX ORDER — TIZANIDINE 4 MG/1
1 TABLET ORAL
Qty: 0 | Refills: 0 | DISCHARGE

## 2022-04-26 RX ORDER — DICLOFENAC SODIUM 75 MG/1
1 TABLET, DELAYED RELEASE ORAL
Qty: 0 | Refills: 0 | DISCHARGE

## 2022-04-26 RX ORDER — TRAZODONE HCL 50 MG
0 TABLET ORAL
Qty: 0 | Refills: 0 | DISCHARGE

## 2022-04-26 RX ORDER — DULOXETINE HYDROCHLORIDE 30 MG/1
1 CAPSULE, DELAYED RELEASE ORAL
Qty: 0 | Refills: 0 | DISCHARGE

## 2022-04-26 NOTE — H&P PST ADULT - HISTORY OF PRESENT ILLNESS
61 year old female with PMHX of hypothyroidism, Migraine s/p Lumbar puncture [id number], Cesar Esophagus, anxiety/depression and chronic pain low back pain, Subarachnoid hemorrhage s/p clipping 1989. Pt reports needing a neurology clearance for right THR, neurology found abnormal findings in previous CT scans. Pt denies n/v, headaches, dizziness or  diplopia at this time. Pt evaluated by Dr. Vora for a scheduled Cerebral angiogram on 4/28/22. Pt denies recent travel, sick contact, Covid infection + on 1/3/22 with minor s/s.      **Covid swab on 4/26/22 at [facility]

## 2022-04-26 NOTE — H&P PST ADULT - ALLERGIC/IMMUNOLOGIC
[facility] OUTPATIENT PHYSICAL THERAPY  [phone]                    Lorna Lo   2017  9:15 AM   Therapy Treatment    Dept Phone:  [phone]   Encounter #:  [id number]    Provider:  Dayami Baca PT   Department:  [facility] OUTPATIENT PHYSICAL THERAPY                Your Full Care Plan              Your Updated Medication List      ASK your doctor about these medications     amLODIPine 5 MG tablet   Commonly known as:  NORVASC       aspirin 81 MG EC tablet       atenolol 100 MG tablet   Commonly known as:  TENORMIN       CALTRATE 600 PO       cholecalciferol 1000 UNITS tablet   Commonly known as:  VITAMIN D3       folic acid 1 MG tablet   Commonly known as:  FOLVITE       furosemide 20 MG tablet   Commonly known as:  LASIX       levothyroxine 88 MCG tablet   Commonly known as:  SYNTHROID, LEVOTHROID       pantoprazole 40 MG EC tablet   Commonly known as:  PROTONIX       potassium chloride 10 MEQ CR tablet   Commonly known as:  K-DUR       predniSONE 5 MG tablet   Commonly known as:  DELTASONE               Instructions     None    Patient Instructions History      Upcoming Appointments     Visit Type Date Time Department    TREATMENT 2017  9:15 AM  [facility]    TREATMENT 2017  9:30 AM  [facility]      MyChart Signup     [facility] MobileHelp allows you to send messages to your doctor, view your test results, renew your prescriptions, schedule appointments, and more. To sign up, go to [employer] and click on the Sign Up Now link in the New User? box. Enter your MobileHelp Activation Code exactly as it appears below along with the last four digits of your Social Security Number and your Date of Birth () to complete the sign-up process. If you do not sign up before the expiration date, you must request a new code.    MobileHelp Activation Code: [email or web address]  Expires: 2017 10:02 AM    If you have questions, you can  email [email or web address] or call [phone] to talk to our [facility] staff. Remember, [employer] is NOT to be used for urgent needs. For medical emergencies, dial 911.               Other Info from Your Visit           Your Appointments     Jan 27, 2017  9:30 AM EST   Therapy Treatment with Alis Johnson, PT   [facility] OUTPATIENT PHYSICAL THERAPY ([place])    [address] [place] [id number] [phone]              Allergies     Amoxicillin      Contrast Dye      INTRAVENOUS CONTRAST DYE.     Fosamax [Alendronate]      Lisinopril      Lortab [Hydrocodone-acetaminophen]      Methotrexate Derivatives      Other      CHLOROTHALIDONE    Simvastatin        Vital Signs     Smoking Status                   Former Smoker              negative

## 2022-04-26 NOTE — H&P PST ADULT - ACTIVITY
Pt able to walk 1 block, climb 1 FOS, Pt able to tolerate mooderate house work, limited due to right hip and other joint pains, w/out s/s of C/P or SOB

## 2022-04-26 NOTE — H&P PST ADULT - FALL HARM RISK - UNIVERSAL INTERVENTIONS
Bed in lowest position, wheels locked, appropriate side rails in place/Call bell, personal items and telephone in reach/Instruct patient to call for assistance before getting out of bed or chair/Non-slip footwear when patient is out of bed/[place] to call system/Physically safe environment - no spills, clutter or unnecessary equipment/Purposeful Proactive Rounding/Room/bathroom lighting operational, light cord in reach

## 2022-04-26 NOTE — H&P PST ADULT - PRIMARY CARE PROVIDER
"      Chief Complaint   Patient presents with   • Abnormal Lab     DISCUSS ABNORMAL LABS   • ADHD       Gato Infante male 18  y.o. 1  m.o.    History was provided by the mother.    Child on accutane. Dermatologist mike labs and apparently some were abnormal . Now they want us to interpert them. Also here for adhd check        The following portions of the patient's history were reviewed and updated as appropriate: allergies, current medications, past family history, past medical history, past social history, past surgical history and problem list.    Current Outpatient Medications   Medication Sig Dispense Refill   • methylphenidate (RITALIN) 10 MG tablet Take 1 tablet by mouth Daily. 90 tablet 0     No current facility-administered medications for this visit.        No Known Allergies        Review of Systems   Constitutional: Negative for appetite change, fatigue and fever.   HENT: Negative for congestion, ear pain, hearing loss, rhinorrhea, sneezing and sore throat.    Eyes: Negative for discharge, redness and visual disturbance.   Respiratory: Negative for cough and wheezing.    Cardiovascular: Negative for chest pain and palpitations.   Gastrointestinal: Negative for abdominal pain, constipation, diarrhea, nausea and vomiting.   Genitourinary: Negative for dysuria, frequency and hematuria.   Musculoskeletal: Negative for arthralgias and myalgias.   Skin: Negative for rash.   Neurological: Negative for headache.   Hematological: Negative for adenopathy.   Psychiatric/Behavioral: Negative for behavioral problems and sleep disturbance.              /71   Temp 99.1 °F (37.3 °C) (Temporal)   Ht 177.8 cm (70\")   Wt 95.8 kg (211 lb 3.2 oz)   BMI 30.30 kg/m²     Physical Exam   Constitutional: He is oriented to person, place, and time. He appears well-developed and well-nourished. He is cooperative.   HENT:   Head: Normocephalic.   Nose: Nose normal.   Eyes: Pupils are equal, round, and reactive to light. " Conjunctivae are normal. Right eye exhibits no discharge. Left eye exhibits no discharge.   Neck: Normal range of motion.   Cardiovascular: Normal rate, regular rhythm and normal heart sounds.   No murmur heard.  Pulmonary/Chest: Effort normal and breath sounds normal.   Abdominal: Soft. Bowel sounds are normal. He exhibits no distension and no mass. There is no hepatosplenomegaly. There is no tenderness. There is no rigidity, no rebound, no guarding and no CVA tenderness.   Musculoskeletal: Normal range of motion.   Lymphadenopathy:     He has no cervical adenopathy.   Neurological: He is alert and oriented to person, place, and time.   Skin: Skin is warm and dry. No rash noted.   Psychiatric: He has a normal mood and affect. His speech is normal and behavior is normal. Thought content normal.         Assessment/Plan     Diagnoses and all orders for this visit:    1. Attention deficit hyperactivity disorder (ADHD), unspecified ADHD type (Primary)      Doing well on medicine. Still have not received fax. Mom to  and bring or fax to us    Return in about 6 months (around 7/23/2020).                     Dr. Aragon [phone] (last visit 4/2022: for [facility] for THR)

## 2022-04-26 NOTE — H&P PST ADULT - NSICDXPASTSURGICALHX_GEN_ALL_CORE_FT
PAST SURGICAL HISTORY:  H/O colonoscopy 2018    H/O subarachnoid hemorrhage 1989: geri clip: no MRI's    History of hysteroscopy s/p uterine ablation    History of lumbar puncture 1989, 1990, 1991, 1992    S/P endoscopy 2018

## 2022-04-26 NOTE — H&P PST ADULT - PROBLEM SELECTOR PLAN 1
scheduled Cerebral angiogram on 4/28/22.   -preop instructions given  -Labs: CBC, BMP in [place] 4/1/22, T&S done in PST

## 2022-04-26 NOTE — H&P PST ADULT - NSICDXPASTMEDICALHX_GEN_ALL_CORE_FT
PAST MEDICAL HISTORY:  Arthritis     Arthritis of right hip     Bipolar disorder     Chronic radicular low back pain     COVID-19 fatigue, fever, never hospitalized    History of Castro's esophagus     Hypothyroidism     Migraines     Osteoarthritis right hip,

## 2022-04-28 ENCOUNTER — OUTPATIENT (OUTPATIENT)
Dept: OUTPATIENT SERVICES | Facility: HOSPITAL | Age: 62
LOS: 1 days | End: 2022-04-28
Payer: MEDICARE

## 2022-04-28 ENCOUNTER — APPOINTMENT (OUTPATIENT)
Dept: NEUROSURGERY | Facility: HOSPITAL | Age: 62
End: 2022-04-28

## 2022-04-28 ENCOUNTER — TRANSCRIPTION ENCOUNTER (OUTPATIENT)
Age: 62
End: 2022-04-28

## 2022-04-28 VITALS
SYSTOLIC BLOOD PRESSURE: 137 MMHG | OXYGEN SATURATION: 96 % | TEMPERATURE: 98 F | RESPIRATION RATE: 15 BRPM | DIASTOLIC BLOOD PRESSURE: 68 MMHG | HEIGHT: 66 IN | WEIGHT: 162.04 LBS | HEART RATE: 56 BPM

## 2022-04-28 VITALS — OXYGEN SATURATION: 100 % | HEART RATE: 56 BPM

## 2022-04-28 DIAGNOSIS — Z86.79 PERSONAL HISTORY OF OTHER DISEASES OF THE CIRCULATORY SYSTEM: Chronic | ICD-10-CM

## 2022-04-28 DIAGNOSIS — I67.1 CEREBRAL ANEURYSM, NONRUPTURED: ICD-10-CM

## 2022-04-28 DIAGNOSIS — Z98.890 OTHER SPECIFIED POSTPROCEDURAL STATES: Chronic | ICD-10-CM

## 2022-04-28 PROBLEM — Z87.19 PERSONAL HISTORY OF OTHER DISEASES OF THE DIGESTIVE SYSTEM: Chronic | Status: ACTIVE | Noted: 2022-04-26

## 2022-04-28 PROBLEM — M19.90 UNSPECIFIED OSTEOARTHRITIS, UNSPECIFIED SITE: Chronic | Status: ACTIVE | Noted: 2022-04-26

## 2022-04-28 PROBLEM — U07.1 COVID-19: Chronic | Status: ACTIVE | Noted: 2022-04-26

## 2022-04-28 PROBLEM — G43.909 MIGRAINE, UNSPECIFIED, NOT INTRACTABLE, WITHOUT STATUS MIGRAINOSUS: Chronic | Status: ACTIVE | Noted: 2022-04-26

## 2022-04-28 PROCEDURE — 36227 PLACE CATH XTRNL CAROTID: CPT | Mod: RT

## 2022-04-28 PROCEDURE — 36224 PLACE CATH CAROTD ART: CPT | Mod: RT

## 2022-04-28 PROCEDURE — C1887: CPT

## 2022-04-28 PROCEDURE — 36226 PLACE CATH VERTEBRAL ART: CPT

## 2022-04-28 PROCEDURE — 36227 PLACE CATH XTRNL CAROTID: CPT

## 2022-04-28 PROCEDURE — C1769: CPT

## 2022-04-28 PROCEDURE — 36223 PLACE CATH CAROTID/INOM ART: CPT | Mod: 59

## 2022-04-28 PROCEDURE — 36224 PLACE CATH CAROTD ART: CPT

## 2022-04-28 PROCEDURE — 36223 PLACE CATH CAROTID/INOM ART: CPT | Mod: 59,LT

## 2022-04-28 PROCEDURE — C1894: CPT

## 2022-04-28 PROCEDURE — 76377 3D RENDER W/INTRP POSTPROCES: CPT | Mod: 26

## 2022-04-28 PROCEDURE — 36226 PLACE CATH VERTEBRAL ART: CPT | Mod: LT

## 2022-04-28 NOTE — CHART NOTE - NSCHARTNOTEFT_GEN_A_CORE
Interventional Neuro Radiology  Pre-Procedure Note    This is a 61y ____ hand dominant Female      HPI:      Neuro Exam: Awake and alert, oriented x3, fluent, normal naming and repetition, follows 3 step commands. Extraocular movements intact, no nystagmus, visual fields full, face symmetric, tongue midline. No drift, 5/5 power x 4 extremities. Normal sensation to LT. Normal finger-to-nose and rapid alternating movements.    PAST MEDICAL & SURGICAL HISTORY:  Bipolar disorder    Hypothyroidism    Chronic radicular low back pain    Arthritis of right hip    Migraines    History of Castro&#x27;s esophagus    Osteoarthritis  right hip,    Arthritis    COVID-19  fatigue, fever, never hospitalized    History of hysteroscopy  s/p uterine ablation    H/O subarachnoid hemorrhage  1989: yasergil clip: no MRI&#x27;s    History of lumbar puncture  1989, 1990, 1991, 1992    H/O colonoscopy  2018    S/P endoscopy  2018        Social History:   Denies tobacco use    FAMILY HISTORY:  FH: breast cancer (Mother)      No pertinent family history    Allergies: No Known Allergies      Current Medications:     Labs:                 HCG:     Blood Bank: 04-26-22  O  --  Positive      Assessment/Plan:   This is a 61y ____ hand dominant Female  presents with ______. Patient presents to neuro-IR for selective cerebral angiography. Procedure/ risks/ benefits/ goals/ alternatives were explained. Risks include but are not limited to stroke/ vessel injury/ hemorrhage/ groin hematoma. All questions answered. Informed content obtained from patient____. Consent placed in chart. Interventional Neuro Radiology  Pre-Procedure Note    This is a 61 year old female with PMHX of hypothyroidism, Migraine s/p Lumbar puncture [id number], Cesar Esophagus, anxiety/depression and chronic pain low back pain, Subarachnoid hemorrhage s/p clipping 1989. Pt reports needing a neurology clearance for right THR, neurology found abnormal findings in previous CT scans. Pt denies n/v, headaches, dizziness or diplopia at this time. Pt denies recent travel, sick contact, Covid infection + on 1/3/22 with minor s/s.  Pt presents today for cerebral angiogram.      Neuro Exam: Awake and alert, oriented x3, fluent, normal naming and repetition, follows 3 step commands. Extraocular movements intact, no nystagmus, visual fields full, face symmetric, tongue midline. No drift, 5/5 power x 4 extremities. Normal sensation to LT. Normal finger-to-nose and rapid alternating movements.    PAST MEDICAL & SURGICAL HISTORY:  Bipolar disorder    Hypothyroidism    Chronic radicular low back pain    Arthritis of right hip    Migraines    History of Castro&#x27;s esophagus    Osteoarthritis  right hip,    Arthritis    COVID-19  fatigue, fever, never hospitalized    History of hysteroscopy  s/p uterine ablation    H/O subarachnoid hemorrhage  1989: yasergil clip: no MRI&#x27;s    History of lumbar puncture  1989, 1990, 1991, 1992    H/O colonoscopy  2018    S/P endoscopy  2018        Social History:   Denies tobacco use    FAMILY HISTORY:  FH: breast cancer (Mother)      No pertinent family history    Allergies: No Known Allergies      Current Medications:   · 	levothyroxine 88 mcg (0.088 mg) oral tablet: Last Dose Taken:  , 1 tab(s) orally once a day  · 	Cymbalta 60 mg oral delayed release capsule: Last Dose Taken:  , 1 cap(s) orally once a day (at bedtime)  · 	traZODone 50 mg oral tablet: Last Dose Taken:  , orally once a day (at bedtime)  · 	baclofen 20 mg oral tablet: Last Dose Taken:  , 1 tab(s) orally 2 times a day  · 	CeleBREX 100 mg oral capsule: Last Dose Taken:  , 1 cap(s) orally 2 times a day  · 	gabapentin 300 mg oral capsule: Last Dose Taken:  , 1 cap(s) orally once a day (at bedtime)  · 	omeprazole 40 mg oral delayed release capsule: Last Dose Taken:  , 1 cap(s) orally once a day, As Needed  · 	clonazePAM 1 mg oral tablet: Last Dose Taken:  , 1 tab(s) orally 3 times a day, As Needed  · 	oxyCODONE 30 mg oral tablet: Last Dose Taken:  , 1 tab(s) orally every 6 hours, As Needed      Labs: seen and reviewed in [facility]    Blood Bank: 04-26-22  O  --  Positive      Assessment/Plan:   This is a 62y/o Female who presents with previously clipped acomm artery aneurysm. Patient presents to neuro-IR for selective cerebral angiography. Procedure/ risks/ benefits/ goals/ alternatives were explained. Risks include but are not limited to stroke/ vessel injury/ hemorrhage/ groin hematoma. All questions answered. Informed content obtained from patient. Consent placed in chart.    Lavonne Lassiter PA-C

## 2022-04-28 NOTE — ASU DISCHARGE PLAN (ADULT/PEDIATRIC) - NS MD DC FALL RISK RISK
For information on Fall & Injury Prevention, visit: [email or web address] OR  [email or web address] OR  https://www.cdc.gov/steadi/patient.html

## 2022-04-28 NOTE — ASU PATIENT PROFILE, ADULT - WILL THE PATIENT ACCEPT THE PFIZER COVID-19 VACCINE IF ELIGIBLE AND IT IS AVAILABLE?

## 2022-04-28 NOTE — ASU PATIENT PROFILE, ADULT - FALL HARM RISK - UNIVERSAL INTERVENTIONS
Bed in lowest position, wheels locked, appropriate side rails in place/Call bell, personal items and telephone in reach/Instruct patient to call for assistance before getting out of bed or chair/Non-slip footwear when patient is out of bed/[place] to call system/Physically safe environment - no spills, clutter or unnecessary equipment/Purposeful Proactive Rounding/Room/bathroom lighting operational, light cord in reach

## 2022-04-28 NOTE — ASU DISCHARGE PLAN (ADULT/PEDIATRIC) - CARE PROVIDER_API CALL
Mason Vora (MD; MS)  Unallocated  [address] [facility], [address] [place] [id number]  Phone: [phone]  Fax: [phone]  Follow Up Time:

## 2022-04-28 NOTE — CHART NOTE - NSCHARTNOTEFT_GEN_A_CORE
Interventional Neuro- Radiology   Procedure Note      Procedure: Selective Cerebral Angiography   Pre- Procedure Diagnosis:  Post- Procedure Diagnosis:    : Dr. Diony MD  Fellow: Dr. Blum  Physician Assistant: Lavonne Lassiter PA-C    RN: Lulu  Tech: Sukumar/ Quinton/ Mihai    Anesthesia: Dr. Jolynn MD (MAC)    I/Os:  Fluids:  Magana: DTV  Contrast:  Estimated Blood Loss: <10cc    Preliminary Report:  Under MAC, using a 5/4Fr radial sheath to the right wrist examination of left vertebral artery/ left internal carotid artery/ left external carotid artery/ right internal carotid artery/ right external carotid artery via selective cerebral angiography demonstrates ________. (Official note to follow).    Patient tolerated procedure well, vital signs stable, hemodynamically stable, no change in neurological status compared to baseline. Results discussed with neurosurgery/ patient and their family. Radial sheath d/c'd, TR band applied at 11:45h with 12cc of air; no bleeding, no hematoma. Patient transferred to PACU for further care/ monitoring.     Lavonne Lassiter PA-C Interventional Neuro- Radiology   Procedure Note      Procedure: Selective Cerebral Angiography   Pre- Procedure Diagnosis: acomm artery aneurysm s/p clipping  Post- Procedure Diagnosis: complete occlusion of previously clipped acomm artery aneurysm; no other aneurysms noted    : Dr. Diony MD  Fellow: Dr. Blum  Physician Assistant: Lavonne Lassiter PA-C    RN: Lulu  Tech: Sukumar/ Quinton/ Mihai    Anesthesia: Dr. Jolynn MD (MAC)    I/Os:  Fluids: 100 cc  Magana: DTV  Contrast: 81 cc  Estimated Blood Loss: <10cc    Preliminary Report:  Under MAC, using a 5/4Fr radial sheath to the right wrist examination of left vertebral artery/ left internal carotid artery/ left external carotid artery/ right internal carotid artery/ right external carotid artery via selective cerebral angiography demonstrates complete occlusion of previously clipped acomm artery aneurysm; no other aneurysms noted. (Official note to follow).    Patient tolerated procedure well, vital signs stable, hemodynamically stable, no change in neurological status compared to baseline. Results discussed with neurosurgery/ patient and their family. Radial sheath d/c'd, TR band applied at 11:45h with 12cc of air; no bleeding, no hematoma. Patient transferred to PACU for further care/ monitoring.     Lavonne Lassiter PA-C

## 2022-04-29 ENCOUNTER — TRANSCRIPTION ENCOUNTER (OUTPATIENT)
Age: 62
End: 2022-04-29

## 2022-05-02 ENCOUNTER — APPOINTMENT (OUTPATIENT)
Dept: ORTHOPEDIC SURGERY | Facility: CLINIC | Age: 62
End: 2022-05-02

## 2022-05-10 DIAGNOSIS — Z09 ENCOUNTER FOR FOLLOW-UP EXAMINATION AFTER COMPLETED TREATMENT FOR CONDITIONS OTHER THAN MALIGNANT NEOPLASM: ICD-10-CM

## 2022-06-01 ENCOUNTER — APPOINTMENT (OUTPATIENT)
Dept: ORTHOPEDIC SURGERY | Facility: CLINIC | Age: 62
End: 2022-06-01
Payer: MEDICARE

## 2022-06-01 VITALS — HEIGHT: 66 IN | BODY MASS INDEX: 26.03 KG/M2 | WEIGHT: 162 LBS

## 2022-06-01 PROCEDURE — 20610 DRAIN/INJ JOINT/BURSA W/O US: CPT

## 2022-06-01 PROCEDURE — 99214 OFFICE O/P EST MOD 30 MIN: CPT | Mod: 25

## 2022-06-01 PROCEDURE — J3490M: CUSTOM

## 2022-06-01 NOTE — PROCEDURE
[FreeTextEntry3] : \par Injection Procedure Note:\par \par The risks, benefits, and alternatives to corticosteroid injection were reviewed with the patient.  Risks outlined include but are not limited to infection, sepsis, bleeding, scarring, skin discoloration, temporary increase in pain, syncopal episode, failure to resolve symptoms, symptoms recurrence, allergic reaction, flare reaction, and elevation of blood sugar in diabetics.  Patient understood the risks and asked to proceed with this treatment course.\par  \par Patient Identification\par Name/: Verbal with patient and/or family\par  \par Procedure Verification:\par Procedure confirmed with patient or family/designee\par Consent for procedure: Verbal Consent Given\par Relevant documentation completed, reviewed, and signed\par Clinical indications for procedure confirmed\par \par Time-out with all members of procedure team immediately prior to procedure:\par Correct patient identified. Agreement on procedure. Correct side and site.\par \par KNEE INJECTION (STEROID) - RIGHT\par After verbal consent and identification of the correct patient and correct site, the superolateral right knee was prepped using alcohol swabs and betadine. This was allowed time to air dry. A mixture of 1cc DepoMedrol 40mg/ml, 3cc Lidocaine 1%, and 3cc Bupivacaine 0.5% was injected into the suprapatellar pouch using a sterile 22G needle after ethyl chloride spray for skin anesthesia. The patient tolerated the procedure well. After-care instructions were provided and included instructions to ice the area and to call if redness, pain, or fever develop.\par

## 2022-06-01 NOTE — IMAGING

## 2022-06-01 NOTE — ASSESSMENT
[FreeTextEntry1] : xrays 6/16/21 - mod med and PF deg\par \par - We discussed their diagnosis and treatment options at length including surgical and non-surgical options.\par - We will continue conservative treatment with activity modification, PT, icing, weight loss, and anti-inflammatory medications.\par - The patient was provided with a PT prescription to work on ROM, hip ER/abductors strengthening, quad/hamstring stretches and strengthening, and other exercises \par - The patient was advised to let pain guide the gradual advancement of activities. \par - We also discussed the possible of a corticosteroid injection in order to help decrease inflammation and pain so that they can perform better therapy.\par - The risks, benefits, and alternatives to corticosteroid injection were reviewed with the patient and they wished to proceed with this treatment course. \par - Follow up joint replcemtn doc if not better\par

## 2022-06-01 NOTE — HISTORY OF PRESENT ILLNESS
[de-identified] : 61 year old female  chronic right knee pain for years, worsening since Jan 2022. Pain is anteriror and medial and worse with activity and better at rest. assoc with clicking snd sense of stiffness along with swelling. has done PT, ice, nsaids, injections in past with some relief.\par \par 3/2/22 - had CSI (2/9) with temp relief, wants to discusss visco\par 3/9/22 - right knee euflexxa #2\par 3/17/22 - right knee euflexxa #3\par 6/1/22 - mild relief from visco, knee pain returns

## 2022-06-03 ENCOUNTER — APPOINTMENT (OUTPATIENT)
Dept: ORTHOPEDIC SURGERY | Facility: CLINIC | Age: 62
End: 2022-06-03
Payer: MEDICARE

## 2022-06-03 PROCEDURE — 99215 OFFICE O/P EST HI 40 MIN: CPT

## 2022-06-03 NOTE — HISTORY OF PRESENT ILLNESS
[9] : 9 [de-identified] : 6/3/22: Here to f/up right hip. Was scheduled for right MEGAN but was not medically cleared, had to get neuro workup due to history or aneurysm. Now she has been rescheduled for September. She reports continuing right hip pain which can be intense at times. \par \par  61F here with chronic right hip pain. Symptoms have worsened in recent months. She reports pain\par in her groin. trouble with socks and shoes. She has difficulty walking, using a walker. Has tried PT in the past with\par minimal relief. Has tried NSAIDs without relief.\par Currently on oxycodone 15mg 4x a day\par PMH: HTN, Thyroid [FreeTextEntry1] : right knee [FreeTextEntry5] : patient has surgery scheduled for September 27, she states she needs the surgery as soon as possible

## 2022-06-03 NOTE — ASSESSMENT
[FreeTextEntry1] : 61f with right hip djd. Now scheduled for MEGAN in September. \par \par We discussed my findings and the natural history of their condition. We talked about the details of the proposed surgery and the recovery. We discussed the material risks, possible benefits and alternatives to surgery. The risks include but are not limited to infection, bleeding and possible need for blood transfusion, fracture, bowel blockage, bladder retention or infection, need for reoperation, stiffness and/or limited range of motion, possible damage to nerves and blood vessels, failure of fixation of components, risk of deep vein thromboses and pulmonary embolism, wound healing problems, dislocation, and possible leg length discrepancy. Although incredibly rare, we also discussed the risks of a cardiac event, stroke and even death during, or following, the surgery. We discussed the type of implants the patient will be receiving and the type of fixation that will be used, as well as whether a robot or computer navigation aide will be used. The patient understands they will need medical clearance and will attend a preoperative joint education class. We also discussed the type of anesthesia they will receive, and the risks associated with hospital or rehab length of stay, obesity, diabetes and smoking.\par

## 2022-06-15 ENCOUNTER — OUTPATIENT (OUTPATIENT)
Dept: OUTPATIENT SERVICES | Facility: HOSPITAL | Age: 62
LOS: 1 days | Discharge: HOME | End: 2022-06-15
Payer: MEDICARE

## 2022-06-15 VITALS
TEMPERATURE: 98 F | HEIGHT: 66 IN | SYSTOLIC BLOOD PRESSURE: 137 MMHG | WEIGHT: 174.61 LBS | HEART RATE: 60 BPM | DIASTOLIC BLOOD PRESSURE: 82 MMHG | OXYGEN SATURATION: 98 % | RESPIRATION RATE: 18 BRPM

## 2022-06-15 DIAGNOSIS — Z98.890 OTHER SPECIFIED POSTPROCEDURAL STATES: ICD-10-CM

## 2022-06-15 DIAGNOSIS — F41.9 ANXIETY DISORDER, UNSPECIFIED: ICD-10-CM

## 2022-06-15 DIAGNOSIS — Z86.79 PERSONAL HISTORY OF OTHER DISEASES OF THE CIRCULATORY SYSTEM: Chronic | ICD-10-CM

## 2022-06-15 DIAGNOSIS — Z98.890 OTHER SPECIFIED POSTPROCEDURAL STATES: Chronic | ICD-10-CM

## 2022-06-15 DIAGNOSIS — Z01.818 ENCOUNTER FOR OTHER PREPROCEDURAL EXAMINATION: ICD-10-CM

## 2022-06-15 DIAGNOSIS — M16.11 UNILATERAL PRIMARY OSTEOARTHRITIS, RIGHT HIP: ICD-10-CM

## 2022-06-15 LAB
A1C WITH ESTIMATED AVERAGE GLUCOSE RESULT: 4.8 % — SIGNIFICANT CHANGE UP (ref 4–5.6)
ALBUMIN SERPL ELPH-MCNC: 4.4 G/DL — SIGNIFICANT CHANGE UP (ref 3.3–5)
ALP SERPL-CCNC: 71 U/L — SIGNIFICANT CHANGE UP (ref 40–120)
ALT FLD-CCNC: 12 U/L — SIGNIFICANT CHANGE UP (ref 12–78)
ANION GAP SERPL CALC-SCNC: 6 MMOL/L — SIGNIFICANT CHANGE UP (ref 5–17)
APTT BLD: 46.9 SEC — HIGH (ref 27.5–35.5)
AST SERPL-CCNC: 16 U/L — SIGNIFICANT CHANGE UP (ref 15–37)
BASOPHILS # BLD AUTO: 0.03 K/UL — SIGNIFICANT CHANGE UP (ref 0–0.2)
BASOPHILS NFR BLD AUTO: 0.5 % — SIGNIFICANT CHANGE UP (ref 0–2)
BILIRUB SERPL-MCNC: 0.7 MG/DL — SIGNIFICANT CHANGE UP (ref 0.2–1.2)
BLD GP AB SCN SERPL QL: SIGNIFICANT CHANGE UP
BUN SERPL-MCNC: 25 MG/DL — HIGH (ref 7–23)
CALCIUM SERPL-MCNC: 9.3 MG/DL — SIGNIFICANT CHANGE UP (ref 8.5–10.1)
CHLORIDE SERPL-SCNC: 104 MMOL/L — SIGNIFICANT CHANGE UP (ref 96–108)
CO2 SERPL-SCNC: 30 MMOL/L — SIGNIFICANT CHANGE UP (ref 22–31)
CREAT SERPL-MCNC: 1.09 MG/DL — SIGNIFICANT CHANGE UP (ref 0.5–1.3)
EGFR: 58 ML/MIN/1.73M2 — LOW
EOSINOPHIL # BLD AUTO: 0.11 K/UL — SIGNIFICANT CHANGE UP (ref 0–0.5)
EOSINOPHIL NFR BLD AUTO: 1.8 % — SIGNIFICANT CHANGE UP (ref 0–6)
ESTIMATED AVERAGE GLUCOSE: 91 MG/DL — SIGNIFICANT CHANGE UP (ref 68–114)
GLUCOSE SERPL-MCNC: 89 MG/DL — SIGNIFICANT CHANGE UP (ref 70–99)
HCT VFR BLD CALC: 42 % — SIGNIFICANT CHANGE UP (ref 34.5–45)
HGB BLD-MCNC: 13.6 G/DL — SIGNIFICANT CHANGE UP (ref 11.5–15.5)
IMM GRANULOCYTES NFR BLD AUTO: 0.5 % — SIGNIFICANT CHANGE UP (ref 0–1.5)
INR BLD: 0.93 RATIO — SIGNIFICANT CHANGE UP (ref 0.88–1.16)
LYMPHOCYTES # BLD AUTO: 1.44 K/UL — SIGNIFICANT CHANGE UP (ref 1–3.3)
LYMPHOCYTES # BLD AUTO: 23 % — SIGNIFICANT CHANGE UP (ref 13–44)
MCHC RBC-ENTMCNC: 30.5 PG — SIGNIFICANT CHANGE UP (ref 27–34)
MCHC RBC-ENTMCNC: 32.4 G/DL — SIGNIFICANT CHANGE UP (ref 32–36)
MCV RBC AUTO: 94.2 FL — SIGNIFICANT CHANGE UP (ref 80–100)
MONOCYTES # BLD AUTO: 0.44 K/UL — SIGNIFICANT CHANGE UP (ref 0–0.9)
MONOCYTES NFR BLD AUTO: 7 % — SIGNIFICANT CHANGE UP (ref 2–14)
NEUTROPHILS # BLD AUTO: 4.2 K/UL — SIGNIFICANT CHANGE UP (ref 1.8–7.4)
NEUTROPHILS NFR BLD AUTO: 67.2 % — SIGNIFICANT CHANGE UP (ref 43–77)
NRBC # BLD: 0 /100 WBCS — SIGNIFICANT CHANGE UP (ref 0–0)
PLATELET # BLD AUTO: 167 K/UL — SIGNIFICANT CHANGE UP (ref 150–400)
POTASSIUM SERPL-MCNC: 4.2 MMOL/L — SIGNIFICANT CHANGE UP (ref 3.5–5.3)
POTASSIUM SERPL-SCNC: 4.2 MMOL/L — SIGNIFICANT CHANGE UP (ref 3.5–5.3)
PROT SERPL-MCNC: 7.7 GM/DL — SIGNIFICANT CHANGE UP (ref 6–8.3)
PROTHROM AB SERPL-ACNC: 11.2 SEC — SIGNIFICANT CHANGE UP (ref 10.5–13.4)
RBC # BLD: 4.46 M/UL — SIGNIFICANT CHANGE UP (ref 3.8–5.2)
RBC # FLD: 13.1 % — SIGNIFICANT CHANGE UP (ref 10.3–14.5)
SODIUM SERPL-SCNC: 140 MMOL/L — SIGNIFICANT CHANGE UP (ref 135–145)
WBC # BLD: 6.25 K/UL — SIGNIFICANT CHANGE UP (ref 3.8–10.5)
WBC # FLD AUTO: 6.25 K/UL — SIGNIFICANT CHANGE UP (ref 3.8–10.5)

## 2022-06-15 PROCEDURE — 93010 ELECTROCARDIOGRAM REPORT: CPT

## 2022-06-15 RX ORDER — TRAZODONE HCL 50 MG
0 TABLET ORAL
Qty: 0 | Refills: 0 | DISCHARGE

## 2022-06-15 RX ORDER — GABAPENTIN 400 MG/1
1 CAPSULE ORAL
Qty: 0 | Refills: 0 | DISCHARGE

## 2022-06-15 RX ORDER — CELECOXIB 200 MG/1
1 CAPSULE ORAL
Qty: 0 | Refills: 0 | DISCHARGE

## 2022-06-15 NOTE — H&P PST ADULT - ASSESSMENT
61 year old female with PMHX of hypothyroidism, Migraine s/p Lumbar puncture [id number], Cesar Esophagus, anxiety/depression and chronic pain low back pain, Subarachnoid hemorrhage s/p clipping  c/o pain and limited ROM to right hip secondary to ostearthritis She is scheduled for a right anterior total hip replacement with image on 2022.      CAPRINI SCORE [CLOT]    AGE RELATED RISK FACTORS                                                       MOBILITY RELATED FACTORS  [x ] Age 41-60 years                                            (1 Point)                  [ ] Bed rest                                                        (1 Point)  [ ] Age: 61-74 years                                           (2 Points)                 [ ] Plaster cast                                                   (2 Points)  [ ] Age= 75 years                                              (3 Points)                 [ ] Bed bound for more than 72 hours                 (2 Points)    DISEASE RELATED RISK FACTORS                                               GENDER SPECIFIC FACTORS  [ ] Edema in the lower extremities                       (1 Point)                  [ ] Pregnancy                                                     (1 Point)  [ ] Varicose veins                                               (1 Point)                  [ ] Post-partum < 6 weeks                                   (1 Point)             [ x] BMI > 25 Kg/m2                                            (1 Point)                  [ ] Hormonal therapy  or oral contraception          (1 Point)                 [ ] Sepsis (in the previous month)                        (1 Point)                  [ ] History of pregnancy complications                 (1 point)  [ ] Pneumonia or serious lung disease                                               [ ] Unexplained or recurrent                     (1 Point)           (in the previous month)                               (1 Point)  [ ] Abnormal pulmonary function test                     (1 Point)                 SURGERY RELATED RISK FACTORS  [ ] Acute myocardial infarction                              (1 Point)                 [ ]  Section                                             (1 Point)  [ ] Congestive heart failure (in the previous month)  (1 Point)               [ ] Minor surgery                                                  (1 Point)   [ ] Inflammatory bowel disease                             (1 Point)                 [ ] Arthroscopic surgery                                        (2 Points)  [ ] Central venous access                                      (2 Points)                [ ] General surgery lasting more than 45 minutes   (2 Points)       [ ] Stroke (in the previous month)                          (5 Points)               [x ] Elective arthroplasty                                         (5 Points)                                                                                                                                               HEMATOLOGY RELATED FACTORS                                                 TRAUMA RELATED RISK FACTORS  [ ] Prior episodes of VTE                                     (3 Points)                [ ] Fracture of the hip, pelvis, or leg                       (5 Points)  [ ] Positive family history for VTE                         (3 Points)                 [ ] Acute spinal cord injury (in the previous month)  (5 Points)  [ ] Prothrombin 12142 A                                     (3 Points)                 [ ] Paralysis  (less than 1 month)                             (5 Points)  [ ] Factor V Leiden                                             (3 Points)                  [ ] Multiple Trauma within 1 month                        (5 Points)  [ ] Lupus anticoagulants                                     (3 Points)                                                           [ ] Anticardiolipin antibodies                               (3 Points)                                                       [ ] High homocysteine in the blood                      (3 Points)                                             [ ] Other congenital or acquired thrombophilia      (3 Points)                                                [ ] Heparin induced thrombocytopenia                  (3 Points)                                          Total Score [       7   ]    Caprini Score 0 - 2:  Low Risk, No VTE Prophylaxis required for most patients, encourage ambulation  Caprini Score 3 - 6:  At Risk, pharmacologic VTE prophylaxis is indicated for most patients (in the absence of a contraindication)  Caprini Score Greater than or = 7:  High Risk, pharmacologic VTE prophylaxis is indicated for most patients (in the absence of a contraindication)    Caprini score indicates that the patient is high risk for VTE event ( score 6 or greater). Surgical patient's in this group will benefit from both pharmacologic prophylaxis and intermittent compression devices . Surgical team will determine the balance between VTE  risk and bleeding risk and other clinical considerations

## 2022-06-15 NOTE — H&P PST ADULT - PROBLEM SELECTOR PLAN 2
labs - cbc,pt/ptt,bmp,t&s,nose cx,ekg  M/C and neurosurgery clearance required  preop 3 day Hibiclens instruction reviewed and given .instructed on if  nose cx positive use mupuricin 5 days and checklist given  take routine meds DOS with sips of water. avoid NSAID and OTC supplements. verbalized understanding  information on proper nutrition , increase protein and better food choices provided in packet

## 2022-06-15 NOTE — H&P PST ADULT - HISTORY OF PRESENT ILLNESS
61 year old female with PMHX of hypothyroidism, Migraine s/p Lumbar puncture [phone], Cesar Esophagus, anxiety/depression and chronic pain low back pain, Subarachnoid hemorrhage s/p clipping 1989 c/o pain and limited ROM to right hip secondary to ostearthritis She is scheduled for a right anterior total hip replacement with image on 6/29/2022.    She denies fever, cough, SOB., recent travels, and sick contacts.    Goal: to walk without pain.

## 2022-06-15 NOTE — H&P PST ADULT - LIVING CHILDREN, OB PROFILE
Impression: Myopia, bilateral: H52.13 Bilateral. Condition: mild. Plan: Discussed diagnosis in detail with patient. New glasses Rx was given today.
1

## 2022-06-15 NOTE — H&P PST ADULT - ANESTHESIA, PREVIOUS REACTION, PROFILE
Anticipated Discharge Disposition: Pending medical team.    Action: LSW spoke with pt caregiver Radha who reports that her sister and herself take care of the pt checking on her 4-5 times a day each. Pt  also helps out. Pt downstairs of home is converted into a bedroom for the pt . The pt has  Hospital bed, WC and is incontinent pt uses adult diapers. Pt has had [facility] in the past.     Barriers to Discharge: None    Plan: f/u in IDT rounds for medical team collaboration on DC recommendations.      none

## 2022-06-16 LAB
APTT BLD: 46.3 SEC — HIGH (ref 27.5–35.5)
MRSA PCR RESULT.: SIGNIFICANT CHANGE UP
S AUREUS DNA NOSE QL NAA+PROBE: SIGNIFICANT CHANGE UP
VIT D25+D1,25 OH+D1,25 PNL SERPL-MCNC: 61.1 PG/ML — SIGNIFICANT CHANGE UP (ref 19.9–79.3)

## 2022-06-28 ENCOUNTER — TRANSCRIPTION ENCOUNTER (OUTPATIENT)
Age: 62
End: 2022-06-28

## 2022-06-29 ENCOUNTER — APPOINTMENT (OUTPATIENT)
Dept: ORTHOPEDIC SURGERY | Facility: HOSPITAL | Age: 62
End: 2022-06-29

## 2022-06-29 ENCOUNTER — OUTPATIENT (OUTPATIENT)
Dept: OUTPATIENT SERVICES | Facility: HOSPITAL | Age: 62
LOS: 1 days | Discharge: HOME HEALTH | End: 2022-06-29

## 2022-06-29 ENCOUNTER — TRANSCRIPTION ENCOUNTER (OUTPATIENT)
Age: 62
End: 2022-06-29

## 2022-06-29 DIAGNOSIS — Z98.890 OTHER SPECIFIED POSTPROCEDURAL STATES: Chronic | ICD-10-CM

## 2022-06-29 DIAGNOSIS — Z86.79 PERSONAL HISTORY OF OTHER DISEASES OF THE CIRCULATORY SYSTEM: Chronic | ICD-10-CM

## 2022-06-29 PROCEDURE — 27130 TOTAL HIP ARTHROPLASTY: CPT | Mod: RT

## 2022-06-30 RX ORDER — HYLAN G-F 20 16MG/2ML
0 SYRINGE (ML) INTRAARTICULAR
Qty: 0 | Refills: 0 | DISCHARGE

## 2022-06-30 RX ORDER — OXYCODONE HYDROCHLORIDE 5 MG/1
1 TABLET ORAL
Qty: 0 | Refills: 0 | DISCHARGE

## 2022-06-30 RX ORDER — CELECOXIB 200 MG/1
0 CAPSULE ORAL
Qty: 0 | Refills: 0 | DISCHARGE

## 2022-06-30 RX ORDER — MILK THISTLE 180 MG
0 CAPSULE ORAL
Qty: 0 | Refills: 0 | DISCHARGE

## 2022-06-30 RX ORDER — UBROGEPANT 100 MG/1
0 TABLET ORAL
Qty: 0 | Refills: 0 | DISCHARGE

## 2022-07-07 DIAGNOSIS — E03.9 HYPOTHYROIDISM, UNSPECIFIED: ICD-10-CM

## 2022-07-07 DIAGNOSIS — F41.9 ANXIETY DISORDER, UNSPECIFIED: ICD-10-CM

## 2022-07-07 DIAGNOSIS — M16.11 UNILATERAL PRIMARY OSTEOARTHRITIS, RIGHT HIP: ICD-10-CM

## 2022-07-07 DIAGNOSIS — Z79.1 LONG TERM (CURRENT) USE OF NON-STEROIDAL ANTI-INFLAMMATORIES (NSAID): ICD-10-CM

## 2022-07-07 DIAGNOSIS — Z86.16 PERSONAL HISTORY OF COVID-19: ICD-10-CM

## 2022-07-07 DIAGNOSIS — I10 ESSENTIAL (PRIMARY) HYPERTENSION: ICD-10-CM

## 2022-07-07 DIAGNOSIS — Z87.891 PERSONAL HISTORY OF NICOTINE DEPENDENCE: ICD-10-CM

## 2022-07-07 DIAGNOSIS — R79.1 ABNORMAL COAGULATION PROFILE: ICD-10-CM

## 2022-07-07 DIAGNOSIS — F31.9 BIPOLAR DISORDER, UNSPECIFIED: ICD-10-CM

## 2022-07-07 DIAGNOSIS — G89.29 OTHER CHRONIC PAIN: ICD-10-CM

## 2022-07-07 DIAGNOSIS — Z79.891 LONG TERM (CURRENT) USE OF OPIATE ANALGESIC: ICD-10-CM

## 2022-07-07 DIAGNOSIS — K21.9 GASTRO-ESOPHAGEAL REFLUX DISEASE WITHOUT ESOPHAGITIS: ICD-10-CM

## 2022-07-07 DIAGNOSIS — J45.909 UNSPECIFIED ASTHMA, UNCOMPLICATED: ICD-10-CM

## 2022-07-11 ENCOUNTER — OUTPATIENT (OUTPATIENT)
Dept: OUTPATIENT SERVICES | Facility: HOSPITAL | Age: 62
LOS: 1 days | Discharge: HOME | End: 2022-07-11

## 2022-07-11 ENCOUNTER — INPATIENT (INPATIENT)
Facility: HOSPITAL | Age: 62
LOS: 6 days | Discharge: HOME HEALTH | End: 2022-07-18
Attending: FAMILY MEDICINE | Admitting: FAMILY MEDICINE

## 2022-07-11 VITALS
SYSTOLIC BLOOD PRESSURE: 113 MMHG | DIASTOLIC BLOOD PRESSURE: 73 MMHG | HEART RATE: 120 BPM | WEIGHT: 166.45 LBS | HEIGHT: 64 IN | TEMPERATURE: 103 F | RESPIRATION RATE: 24 BRPM | OXYGEN SATURATION: 97 %

## 2022-07-11 DIAGNOSIS — Z98.890 OTHER SPECIFIED POSTPROCEDURAL STATES: Chronic | ICD-10-CM

## 2022-07-11 DIAGNOSIS — Z86.79 PERSONAL HISTORY OF OTHER DISEASES OF THE CIRCULATORY SYSTEM: Chronic | ICD-10-CM

## 2022-07-11 DIAGNOSIS — Z96.641 PRESENCE OF RIGHT ARTIFICIAL HIP JOINT: ICD-10-CM

## 2022-07-11 LAB
ALBUMIN SERPL ELPH-MCNC: 3 G/DL — LOW (ref 3.3–5)
ALP SERPL-CCNC: 72 U/L — SIGNIFICANT CHANGE UP (ref 40–120)
ALT FLD-CCNC: 14 U/L — SIGNIFICANT CHANGE UP (ref 12–78)
ANION GAP SERPL CALC-SCNC: 10 MMOL/L — SIGNIFICANT CHANGE UP (ref 5–17)
APPEARANCE UR: CLEAR — SIGNIFICANT CHANGE UP
APTT BLD: 35.2 SEC — SIGNIFICANT CHANGE UP (ref 27.5–35.5)
AST SERPL-CCNC: 15 U/L — SIGNIFICANT CHANGE UP (ref 15–37)
BASOPHILS # BLD AUTO: 0.03 K/UL — SIGNIFICANT CHANGE UP (ref 0–0.2)
BASOPHILS NFR BLD AUTO: 0.2 % — SIGNIFICANT CHANGE UP (ref 0–2)
BILIRUB SERPL-MCNC: 0.4 MG/DL — SIGNIFICANT CHANGE UP (ref 0.2–1.2)
BILIRUB UR-MCNC: NEGATIVE — SIGNIFICANT CHANGE UP
BUN SERPL-MCNC: 34 MG/DL — HIGH (ref 7–23)
CALCIUM SERPL-MCNC: 8.3 MG/DL — LOW (ref 8.5–10.1)
CHLORIDE SERPL-SCNC: 102 MMOL/L — SIGNIFICANT CHANGE UP (ref 96–108)
CO2 SERPL-SCNC: 25 MMOL/L — SIGNIFICANT CHANGE UP (ref 22–31)
COLOR SPEC: YELLOW — SIGNIFICANT CHANGE UP
CREAT SERPL-MCNC: 1.03 MG/DL — SIGNIFICANT CHANGE UP (ref 0.5–1.3)
DIFF PNL FLD: ABNORMAL
EGFR: 62 ML/MIN/1.73M2 — SIGNIFICANT CHANGE UP
EOSINOPHIL # BLD AUTO: 0.17 K/UL — SIGNIFICANT CHANGE UP (ref 0–0.5)
EOSINOPHIL NFR BLD AUTO: 1.1 % — SIGNIFICANT CHANGE UP (ref 0–6)
EPI CELLS # UR: SIGNIFICANT CHANGE UP
ERYTHROCYTE [SEDIMENTATION RATE] IN BLOOD: 25 MM/HR — HIGH (ref 0–20)
FLUAV AG NPH QL: SIGNIFICANT CHANGE UP
FLUBV AG NPH QL: SIGNIFICANT CHANGE UP
GLUCOSE SERPL-MCNC: 93 MG/DL — SIGNIFICANT CHANGE UP (ref 70–99)
GLUCOSE UR QL: NEGATIVE MG/DL — SIGNIFICANT CHANGE UP
HCT VFR BLD CALC: 31.5 % — LOW (ref 34.5–45)
HGB BLD-MCNC: 10.2 G/DL — LOW (ref 11.5–15.5)
IMM GRANULOCYTES NFR BLD AUTO: 0.5 % — SIGNIFICANT CHANGE UP (ref 0–1.5)
INR BLD: 1.15 RATIO — SIGNIFICANT CHANGE UP (ref 0.88–1.16)
KETONES UR-MCNC: NEGATIVE — SIGNIFICANT CHANGE UP
LACTATE SERPL-SCNC: 1.3 MMOL/L — SIGNIFICANT CHANGE UP (ref 0.7–2)
LEUKOCYTE ESTERASE UR-ACNC: NEGATIVE — SIGNIFICANT CHANGE UP
LYMPHOCYTES # BLD AUTO: 0.85 K/UL — LOW (ref 1–3.3)
LYMPHOCYTES # BLD AUTO: 5.5 % — LOW (ref 13–44)
MCHC RBC-ENTMCNC: 30.9 PG — SIGNIFICANT CHANGE UP (ref 27–34)
MCHC RBC-ENTMCNC: 32.4 G/DL — SIGNIFICANT CHANGE UP (ref 32–36)
MCV RBC AUTO: 95.5 FL — SIGNIFICANT CHANGE UP (ref 80–100)
MONOCYTES # BLD AUTO: 0.38 K/UL — SIGNIFICANT CHANGE UP (ref 0–0.9)
MONOCYTES NFR BLD AUTO: 2.5 % — SIGNIFICANT CHANGE UP (ref 2–14)
NEUTROPHILS # BLD AUTO: 14 K/UL — HIGH (ref 1.8–7.4)
NEUTROPHILS NFR BLD AUTO: 90.2 % — HIGH (ref 43–77)
NITRITE UR-MCNC: NEGATIVE — SIGNIFICANT CHANGE UP
NRBC # BLD: 0 /100 WBCS — SIGNIFICANT CHANGE UP (ref 0–0)
PH UR: 5 — SIGNIFICANT CHANGE UP (ref 5–8)
PLATELET # BLD AUTO: 264 K/UL — SIGNIFICANT CHANGE UP (ref 150–400)
POTASSIUM SERPL-MCNC: 4.5 MMOL/L — SIGNIFICANT CHANGE UP (ref 3.5–5.3)
POTASSIUM SERPL-SCNC: 4.5 MMOL/L — SIGNIFICANT CHANGE UP (ref 3.5–5.3)
PROT SERPL-MCNC: 6.4 GM/DL — SIGNIFICANT CHANGE UP (ref 6–8.3)
PROT UR-MCNC: NEGATIVE MG/DL — SIGNIFICANT CHANGE UP
PROTHROM AB SERPL-ACNC: 13.8 SEC — HIGH (ref 10.5–13.4)
RBC # BLD: 3.3 M/UL — LOW (ref 3.8–5.2)
RBC # FLD: 13.5 % — SIGNIFICANT CHANGE UP (ref 10.3–14.5)
RBC CASTS # UR COMP ASSIST: SIGNIFICANT CHANGE UP /HPF (ref 0–4)
SARS-COV-2 RNA SPEC QL NAA+PROBE: SIGNIFICANT CHANGE UP
SODIUM SERPL-SCNC: 137 MMOL/L — SIGNIFICANT CHANGE UP (ref 135–145)
SP GR SPEC: 1.01 — SIGNIFICANT CHANGE UP (ref 1.01–1.02)
UROBILINOGEN FLD QL: NEGATIVE MG/DL — SIGNIFICANT CHANGE UP
WBC # BLD: 15.5 K/UL — HIGH (ref 3.8–10.5)
WBC # FLD AUTO: 15.5 K/UL — HIGH (ref 3.8–10.5)
WBC UR QL: NEGATIVE — SIGNIFICANT CHANGE UP

## 2022-07-11 PROCEDURE — 99285 EMERGENCY DEPT VISIT HI MDM: CPT

## 2022-07-11 PROCEDURE — 74177 CT ABD & PELVIS W/CONTRAST: CPT | Mod: 26,MA

## 2022-07-11 PROCEDURE — 76882 US LMTD JT/FCL EVL NVASC XTR: CPT | Mod: 26,RT

## 2022-07-11 PROCEDURE — 93010 ELECTROCARDIOGRAM REPORT: CPT

## 2022-07-11 PROCEDURE — 71045 X-RAY EXAM CHEST 1 VIEW: CPT | Mod: 26

## 2022-07-11 PROCEDURE — 73502 X-RAY EXAM HIP UNI 2-3 VIEWS: CPT | Mod: 26,RT

## 2022-07-11 RX ORDER — SODIUM CHLORIDE 9 MG/ML
2300 INJECTION, SOLUTION INTRAVENOUS ONCE
Refills: 0 | Status: COMPLETED | OUTPATIENT
Start: 2022-07-11 | End: 2022-07-11

## 2022-07-11 RX ORDER — ACETAMINOPHEN 500 MG
650 TABLET ORAL ONCE
Refills: 0 | Status: DISCONTINUED | OUTPATIENT
Start: 2022-07-11 | End: 2022-07-11

## 2022-07-11 RX ORDER — ACETAMINOPHEN 500 MG
650 TABLET ORAL ONCE
Refills: 0 | Status: COMPLETED | OUTPATIENT
Start: 2022-07-11 | End: 2022-07-11

## 2022-07-11 RX ORDER — IBUPROFEN 200 MG
600 TABLET ORAL ONCE
Refills: 0 | Status: COMPLETED | OUTPATIENT
Start: 2022-07-11 | End: 2022-07-11

## 2022-07-11 RX ORDER — PIPERACILLIN AND TAZOBACTAM 4; .5 G/20ML; G/20ML
3.38 INJECTION, POWDER, LYOPHILIZED, FOR SOLUTION INTRAVENOUS ONCE
Refills: 0 | Status: COMPLETED | OUTPATIENT
Start: 2022-07-11 | End: 2022-07-11

## 2022-07-11 RX ORDER — VANCOMYCIN HCL 1 G
1000 VIAL (EA) INTRAVENOUS ONCE
Refills: 0 | Status: COMPLETED | OUTPATIENT
Start: 2022-07-11 | End: 2022-07-11

## 2022-07-11 RX ADMIN — Medication 250 MILLIGRAM(S): at 22:42

## 2022-07-11 RX ADMIN — Medication 650 MILLIGRAM(S): at 21:29

## 2022-07-11 RX ADMIN — SODIUM CHLORIDE 2300 MILLILITER(S): 9 INJECTION, SOLUTION INTRAVENOUS at 23:33

## 2022-07-11 RX ADMIN — PIPERACILLIN AND TAZOBACTAM 200 GRAM(S): 4; .5 INJECTION, POWDER, LYOPHILIZED, FOR SOLUTION INTRAVENOUS at 21:22

## 2022-07-11 RX ADMIN — Medication 1000 MILLIGRAM(S): at 23:58

## 2022-07-11 RX ADMIN — Medication 650 MILLIGRAM(S): at 22:47

## 2022-07-11 RX ADMIN — SODIUM CHLORIDE 2300 MILLILITER(S): 9 INJECTION, SOLUTION INTRAVENOUS at 21:08

## 2022-07-11 RX ADMIN — Medication 600 MILLIGRAM(S): at 23:32

## 2022-07-11 RX ADMIN — PIPERACILLIN AND TAZOBACTAM 3.38 GRAM(S): 4; .5 INJECTION, POWDER, LYOPHILIZED, FOR SOLUTION INTRAVENOUS at 22:47

## 2022-07-11 NOTE — CONSULT NOTE ADULT - SUBJECTIVE AND OBJECTIVE BOX
Orthopedics Consult Note:    This is a 62y Female who presents to the ED with right hip pain, progressive difficulty bearing weight for 4 days s/p R Sagar w/ Dr. Cortez on 6/29/22 associated with fevers today as well as increasing chills x4 days. Pt states she has been doing well, ambulating with a walker during her rehabilitation at [facility]. Pt denies any recent fall, injury or trauma. Pt denies head trauma and LOC. Pt denies numbness, tingling or paresthesias. Patient states that her temperature at rehab was around 102 and she had "chills so bad she was shaking" and "couldn't get warm" under 4 blankets. Patient also reports that her roommate was defecating all over the room and bathroom, for which she had to have her room changed.     Of note, patient reports approximately 2 weeks prior she had a GYN appointment, for which she was found to have bacteria in her urine, and she was given a one time dose of antibiotics, as well as had a colposcopy competed. Patient reports mild burning with urination at present.       PAST MEDICAL & SURGICAL HISTORY:  ELEVATED TEMPERATURE    FH: breast cancer (Mother)    MEWS Score    Bipolar disorder    Hypothyroidism    Chronic radicular low back pain    Arthritis of right hip    Migraines    History of Castro&#x27;s esophagus    Osteoarthritis    Arthritis    COVID-19    History of hysteroscopy    S/P brain surgery    Brain aneurysm    H/O subarachnoid hemorrhage    History of lumbar puncture    H/O colonoscopy    S/P endoscopy    ELEVATED TEMPERATURE        Allergies:  No Known Allergies      Medications:  ibuprofen  Tablet. 600 milliGRAM(s) Oral Once      Vital Signs:  T(C): 37.8 (07-11-22 @ 22:45), Max: 39.6 (07-11-22 @ 20:48)  HR: 120 (07-11-22 @ 20:48) (120 - 120)  BP: 113/73 (07-11-22 @ 20:48) (113/73 - 113/73)  RR: 24 (07-11-22 @ 20:48) (24 - 24)  SpO2: 97% (07-11-22 @ 20:48) (97% - 97%)    Labs:      X-rays of the right hip demonstrate total hip prosthesis with proper fit, fill and alignment; no evidence of fracture or dislocation.   CT of the abd/pelvis: Pending official read    Physical Exam:  Right hip:     No dressing in place  Healing Surgical incision site  +mild-moderate swelling  no palpable effusion,  mild erythema proximally and distally  no ecchymosis  warm, however entire body warm to touch  +TTP over GT  NTTP over the bony prominences of the knee/ankle/foot  painless active ROM of the knee/ankle/foot  Unable to SLR  pROM R Hip limited 2' pain  No pain with axial loading or log roll  Calves are soft and nontender  Compartments soft and compressible  +flexors/quads/hams/TA/EHL/FHL/GSC  SILT L3-S1  DP pulses palpable  Extremity warm / well perfused      SECONDARY EXAM: Motor grossly intact throughout, no other orthopedic injuries at this time, compartments soft and compressible    SPINE: No bony tenderness or step-offs appreciated throughout cervical/thoracic/lumbar/sacral spine    B/l UE: No gross deformity  NTTP over the bony prominences of the shoulder/elbow/wrist/hand  painless active ROM of the shoulder/elbow/wrist/hand  C5-T1 SILT  motor grossly intact throughout axillary/musculocutaenous/radial/median/ulnar nerves  + radial pulse    LLE:   No gross deformity  NTTP over the bony prominences of the hip/knee/ankle/foot  painless active ROM of the hip/knee/ankle/foot  L2-S1 SILT, motor grossly intact throughout hip flexors/quads/hams/TA/EHL/FHL/GSC  + DP/PT pulses  no pain with log roll  no pain on axial loading  compartments soft and compressible  calves nontender      Assessment:  62y Female with r/o Right prosthetic hip joint infection, s/p aTHA 6/29 w/ Dr. Cortez        Plan:  Admit to medical team for fever work up  Will defer Right hip aspiration at present  Will tentatively preop for OR tomorrow 7/12 w/ Dr. Cortez for possible I&D, polyethylene liner exchange, pending fever work up / labs / imaging  Will make NPO and hold chemical anticoagulation after midnight.  IVF while NPO.  Hold DVT/PE prophylaxis for now, for possible OR  In ED Pt TMax 103.2, , /73, RR 24 --> Vanc/Zosyn given per ED  Repeat vitals improved   Would recommend holding ABx at present for possible OR to obtain cx, however if patient becomes clinically unstable would give   Pt advised that surgical intervention might be needed for possible right periprosthetic hip infection, surgical procedure discussed in detail including all risks, benefits and alternatives  pt expresses understanding and consent for procedure  Plan for possible OR 7/12  f/u medical optimization for possible OR.  f/u labs/imaging  FU ESR/CRP  FU BCx  FU UCx  UA: Small Blood  COVID Neg 7/11  FU CXR/EKG  Pain control prn  Ice application  DVT prophylaxis with SCDs for now  WBAT  Dr. Cortez to evaluate patient in AM and assess need for surgical intervention pending complete workup  Case discussed with and plan as per Dr. Cortez Orthopedics Consult Note:    This is a 62y Female who presents to the ED with right hip pain, progressive difficulty bearing weight for 4 days s/p R Sagar w/ Dr. Cortez on 6/29/22 associated with fevers today as well as increasing chills x4 days. Pt states she has been doing well, ambulating with a walker during her rehabilitation at [facility]. Pt denies any recent fall, injury or trauma. Pt denies head trauma and LOC. Pt denies numbness, tingling or paresthesias. Patient states that her temperature at rehab was around 102 and she had "chills so bad she was shaking" and "couldn't get warm" under 4 blankets. Patient also reports that her roommate was defecating all over the room and bathroom, for which she had to have her room changed.     Of note, patient reports approximately 2 weeks prior she had a GYN appointment, for which she was found to have bacteria in her urine, and she was given a one time dose of antibiotics, as well as had a colposcopy competed. Patient reports mild burning with urination at present.       PAST MEDICAL & SURGICAL HISTORY:  ELEVATED TEMPERATURE    FH: breast cancer (Mother)    MEWS Score    Bipolar disorder    Hypothyroidism    Chronic radicular low back pain    Arthritis of right hip    Migraines    History of Castro&#x27;s esophagus    Osteoarthritis    Arthritis    COVID-19    History of hysteroscopy    S/P brain surgery    Brain aneurysm    H/O subarachnoid hemorrhage    History of lumbar puncture    H/O colonoscopy    S/P endoscopy    ELEVATED TEMPERATURE        Allergies:  No Known Allergies      Medications:  ibuprofen  Tablet. 600 milliGRAM(s) Oral Once      Vital Signs:  T(C): 37.8 (07-11-22 @ 22:45), Max: 39.6 (07-11-22 @ 20:48)  HR: 120 (07-11-22 @ 20:48) (120 - 120)  BP: 113/73 (07-11-22 @ 20:48) (113/73 - 113/73)  RR: 24 (07-11-22 @ 20:48) (24 - 24)  SpO2: 97% (07-11-22 @ 20:48) (97% - 97%)    Labs:      X-rays of the right hip demonstrate total hip prosthesis with proper fit, fill and alignment; no evidence of fracture or dislocation.   CT of the abd/pelvis: Pending official read    Physical Exam:  Right hip:     No dressing in place  Healing Surgical incision site  mild erythema proximally and distally  No active drainage  No expressible purulence  +mild-moderate swelling of thigh   no palpable effusion  no ecchymosis  warm, however entire body warm to touch  +TTP over GT  NTTP over the bony prominences of the knee/ankle/foot  painless active ROM of the knee/ankle/foot  Unable to SLR  pROM R Hip limited 2' pain  No pain with axial loading or log roll  Calves are soft and nontender  Compartments soft and compressible  +flexors/quads/hams/TA/EHL/FHL/GSC  SILT L3-S1  DP pulses palpable  Extremity warm / well perfused      SECONDARY EXAM: Motor grossly intact throughout, no other orthopedic injuries at this time, compartments soft and compressible    SPINE: No bony tenderness or step-offs appreciated throughout cervical/thoracic/lumbar/sacral spine    B/l UE: No gross deformity  NTTP over the bony prominences of the shoulder/elbow/wrist/hand  painless active ROM of the shoulder/elbow/wrist/hand  C5-T1 SILT  motor grossly intact throughout axillary/musculocutaenous/radial/median/ulnar nerves  + radial pulse    LLE:   No gross deformity  NTTP over the bony prominences of the hip/knee/ankle/foot  painless active ROM of the hip/knee/ankle/foot  L2-S1 SILT, motor grossly intact throughout hip flexors/quads/hams/TA/EHL/FHL/GSC  + DP/PT pulses  no pain with log roll  no pain on axial loading  compartments soft and compressible  calves nontender      Assessment:  62y Female with r/o Right prosthetic hip joint infection, s/p aTHA 6/29 w/ Dr. Cortez  vs Post operative seroma         Plan:  Admit to medical team for fever work up  Will defer Right hip aspiration at present  Will tentatively preop for OR tomorrow 7/12 w/ Dr. Cortez for possible I&D, polyethylene liner exchange, pending fever work up / labs / imaging  Will make NPO and hold chemical anticoagulation after midnight.  IVF while NPO.  Hold DVT/PE prophylaxis for now, for possible OR  In ED Pt TMax 103.2, , /73, RR 24 --> Vanc/Zosyn given per ED  Repeat vitals improved   Would recommend holding ABx at present for possible OR to obtain cx, however if patient becomes clinically unstable would give   Pt advised that surgical intervention might be needed for possible right periprosthetic hip infection, surgical procedure discussed in detail including all risks, benefits and alternatives  pt expresses understanding and consent for procedure  Plan for possible OR 7/12  f/u medical optimization for possible OR.  f/u labs/imaging  FU ESR/CRP  FU BCx  FU UCx  UA: Small Blood  COVID Neg 7/11  FU CXR/EKG  Likely will need ID C/s  Pain control prn  Ice application  DVT prophylaxis with SCDs for now  WBAT  Dr. Cortez to evaluate patient in AM and assess need for surgical intervention pending complete workup  Case discussed with and plan as per Dr. Cortez Orthopedics Consult Note:    This is a 62y Female who presents to the ED with right hip pain, progressive difficulty bearing weight for 4 days s/p R Sagar w/ Dr. Cortez on 6/29/22 associated with fevers today as well as increasing chills x4 days. Pt states she has been doing well, ambulating with a walker during her rehabilitation at [facility]. Pt denies any recent fall, injury or trauma. Pt denies head trauma and LOC. Pt denies numbness, tingling or paresthesias. Patient states that her temperature at rehab was around 102 and she had "chills so bad she was shaking" and "couldn't get warm" under 4 blankets. Patient also reports that her roommate was defecating all over the room and bathroom, for which she had to have her room changed.     Of note, patient reports approximately 2 weeks prior she had a GYN appointment, for which she was found to have bacteria in her urine, and she was given a one time dose of antibiotics, as well as had a colposcopy competed. Patient reports mild burning with urination at present. On Xarelto for VTE ppx, last dose 7/11      PAST MEDICAL & SURGICAL HISTORY:  ELEVATED TEMPERATURE    FH: breast cancer (Mother)    MEWS Score    Bipolar disorder    Hypothyroidism    Chronic radicular low back pain    Arthritis of right hip    Migraines    History of Castro&#x27;s esophagus    Osteoarthritis    Arthritis    COVID-19    History of hysteroscopy    S/P brain surgery    Brain aneurysm    H/O subarachnoid hemorrhage    History of lumbar puncture    H/O colonoscopy    S/P endoscopy    ELEVATED TEMPERATURE        Allergies:  No Known Allergies      Medications:  ibuprofen  Tablet. 600 milliGRAM(s) Oral Once      Vital Signs:  T(C): 37.8 (07-11-22 @ 22:45), Max: 39.6 (07-11-22 @ 20:48)  HR: 120 (07-11-22 @ 20:48) (120 - 120)  BP: 113/73 (07-11-22 @ 20:48) (113/73 - 113/73)  RR: 24 (07-11-22 @ 20:48) (24 - 24)  SpO2: 97% (07-11-22 @ 20:48) (97% - 97%)    Labs:      X-rays of the right hip demonstrate total hip prosthesis with proper fit, fill and alignment; no evidence of fracture or dislocation.   CT of the abd/pelvis: Pending official read    Physical Exam:  Right hip:     No dressing in place  Healing Surgical incision site  mild erythema proximally and distally  No active drainage  No expressible purulence  +mild-moderate swelling of thigh   no palpable effusion  no ecchymosis  warm, however entire body warm to touch  +TTP over GT  NTTP over the bony prominences of the knee/ankle/foot  painless active ROM of the knee/ankle/foot  Unable to SLR  pROM R Hip limited 2' pain  No pain with axial loading or log roll  Calves are soft and nontender  Compartments soft and compressible  +flexors/quads/hams/TA/EHL/FHL/GSC  SILT L3-S1  DP pulses palpable  Extremity warm / well perfused      SECONDARY EXAM: Motor grossly intact throughout, no other orthopedic injuries at this time, compartments soft and compressible    SPINE: No bony tenderness or step-offs appreciated throughout cervical/thoracic/lumbar/sacral spine    B/l UE: No gross deformity  NTTP over the bony prominences of the shoulder/elbow/wrist/hand  painless active ROM of the shoulder/elbow/wrist/hand  C5-T1 SILT  motor grossly intact throughout axillary/musculocutaenous/radial/median/ulnar nerves  + radial pulse    LLE:   No gross deformity  NTTP over the bony prominences of the hip/knee/ankle/foot  painless active ROM of the hip/knee/ankle/foot  L2-S1 SILT, motor grossly intact throughout hip flexors/quads/hams/TA/EHL/FHL/GSC  + DP/PT pulses  no pain with log roll  no pain on axial loading  compartments soft and compressible  calves nontender      Assessment:  62y Female with r/o Right prosthetic hip joint infection, s/p aTHA 6/29 w/ Dr. Cortez  vs Post operative seroma         Plan:  Admit to medical team for fever work up  Will defer Right hip aspiration at present  Will tentatively preop for OR tomorrow 7/12 w/ Dr. Cortez for possible I&D, polyethylene liner exchange, pending fever work up / labs / imaging  Will make NPO and hold chemical anticoagulation after midnight.  IVF while NPO.  Hold DVT/PE prophylaxis for now, for possible OR  In ED Pt TMax 103.2, , /73, RR 24 --> Vanc/Zosyn given per ED  Repeat vitals improved   Would recommend holding ABx at present for possible OR to obtain cx, however if patient becomes clinically unstable would give   Pt advised that surgical intervention might be needed for possible right periprosthetic hip infection, surgical procedure discussed in detail including all risks, benefits and alternatives  pt expresses understanding and consent for procedure  Plan for possible OR 7/12  f/u medical optimization for possible OR.  f/u labs/imaging  FU ESR/CRP  FU BCx  FU UCx  UA: Small Blood  COVID Neg 7/11  FU CXR/EKG  Likely will need ID C/s  Pain control prn  Ice application  DVT prophylaxis with SCDs for now  WBAT  Dr. Cortez to evaluate patient in AM and assess need for surgical intervention pending complete workup  Case discussed with and plan as per Dr. Cortez

## 2022-07-11 NOTE — ED ADULT TRIAGE NOTE - CHIEF COMPLAINT QUOTE
As per [facility] staff pt with temp 101.8. S/p left hip replacement on 06/29/2022. Sent to ER for rule out sepsis. Tylenol 1000mg given at 2000 by [facility] staff. Pt complains of headache and right leg pain.

## 2022-07-11 NOTE — ED PROVIDER NOTE - PROGRESS NOTE DETAILS
leukocytosis, UA clear, CXR clear, CT AP showing likely seroma at surgical site, no ehancing rim, doubt abscess. D/w ortho, to be admitted to medicine. Currently hemodynamically stable, mentating well.

## 2022-07-11 NOTE — ED ADULT NURSE NOTE - CHIEF COMPLAINT QUOTE
As per [facility] staff pt with temp 101.8. S/p left hip replacement on 06/29/2022. Sent to ER for rule out sepsis. Tylenol 1000mg given at 2000 by [facility] staff. Pt complains of headache and right leg pain.

## 2022-07-11 NOTE — ED PROVIDER NOTE - OBJECTIVE STATEMENT
62F PMHx of arthritis, hypothyroidism, Migraine s/p Lumbar puncture [id number], Cesar Esophagus, anxiety/depression, SAH s/p clipping 1989, recently s/p R THR (6/29) presenting with fever from [facility] rehab. Describes mild cough, dysuria, but otherwise mild drainage from the right thigh surgical site. Denies any rashes, falls, trauma, headaches, neck pain, neck stiffness, back pain, abdominal pain, nausea/vomiting, visual complaints, hematuria, chest pain, shortness of breath.

## 2022-07-11 NOTE — ED PROVIDER NOTE - PHYSICAL EXAMINATION
VITAL SIGNS: I have reviewed nursing notes and confirm.   GEN: Well-developed; well-nourished; in no acute distress. Speaking full sentences.  SKIN: Warm, pink, no rash, no diaphoresis, no cyanosis, well perfused.   HEAD: Normocephalic; atraumatic. No scalp lacerations, no abrasions.  NECK: Supple; non tender. No meningismus   EYES: Pupils 3mm equal, round, reactive to light and accomodation, conjunctiva and sclera clear. Extra-ocular movements intact bilaterally.  ENT: No nasal discharge; airway clear. Trachea is midline. ORAL: No oropharyngeal exudates or erythema. Normal dentition.  CV: (+) tachycardia,. S1, S2 normal; no murmurs, gallops, or rubs. No lower extremity pitting edema bilaterally. Capillary refill < 2 seconds throughout. Distal pulses intact 2+ throughout.  RESP: CTA bilaterally. No wheezes, rales, or rhonchi.   ABD: Normal bowel sounds, soft, non-distended, non-tender, no rebound, no guarding, no rigidity, no hepatosplenomegaly. No CVA tenderness bilaterally.  MSK: Normal range of motion and movement of all 4 extremities. No joint or muscular pain throughout. No clubbing. EXCEPT (+) RIGHT HIP, thigh anterior surgical site with mild clear drainage. No dehiscent. No lymphangitis. No erythema. No fluctuance. Good ROM of right hip.    BACK: No thoracolumbar midline or paravertebral tenderness. No step-offs or obvious deformities.  NEURO: Alert & oriented x 3, Grossly unremarkable. Sensory and motor intact throughout. No focal deficits.  PSYCH: Cooperative, appropriate.

## 2022-07-11 NOTE — ED PROVIDER NOTE - CLINICAL SUMMARY MEDICAL DECISION MAKING FREE TEXT BOX
The patient meets SIRS criteria with (+)  Temp > 100.4F or < 96.8F,  HR > 90 BPM,  RR > 20 or PaCO2 <32 mmHg,  WBC >12k or <4k, or >10% bands. Suspected source of infection is: UTI Vs pna vs THR infection? Patient is currently hemodynamically stable, normotensive, mentating well.   - Two large-bore IV's, 30ml/kg lactated ringers fluid resuscitation,   - CBC, CMP, PT/PTT/INR, Troponin, BNP, UA/UCx, 2 x BCx, ABG with lactate + COVID-19 rule out.   - CXR, CTAP w/ IVC  - Unknown Source: Vancomycin 1g IVPB + zosyn  - ortho consulted  - Re-assessment for fluid responsiveness then admit

## 2022-07-11 NOTE — ED ADULT NURSE NOTE - OBJECTIVE STATEMENT
pt from [facility]. pt alert and oriented x3. As per [facility] staff pt with temp 101.8. S/p left hip replacement on 06/29/2022. Sent to ER for rule out sepsis. Tylenol 1000mg given at 2000 by [facility] staff. Pt complains of headache and right leg pain. pt reports that rehab was going well until about 4 days ago when she began having pain and not feeling well. incision to right hip noted to be clean and dry. pt placed on cardiac monitor. 2 large bore IVs inserted, fluids and abx initiated as ordered.

## 2022-07-11 NOTE — ED ADULT NURSE NOTE - NSFALLRSKASSESASSIST_ED_ALL_ED
Patient denied PCP notification of admission to [facility] in [place] Date: 9/13/2021 Time 1533      no

## 2022-07-12 ENCOUNTER — TRANSCRIPTION ENCOUNTER (OUTPATIENT)
Age: 62
End: 2022-07-12

## 2022-07-12 DIAGNOSIS — M00.9 PYOGENIC ARTHRITIS, UNSPECIFIED: ICD-10-CM

## 2022-07-12 DIAGNOSIS — R50.9 FEVER, UNSPECIFIED: ICD-10-CM

## 2022-07-12 DIAGNOSIS — F41.9 ANXIETY DISORDER, UNSPECIFIED: ICD-10-CM

## 2022-07-12 DIAGNOSIS — E03.9 HYPOTHYROIDISM, UNSPECIFIED: ICD-10-CM

## 2022-07-12 LAB
ANION GAP SERPL CALC-SCNC: 8 MMOL/L — SIGNIFICANT CHANGE UP (ref 5–17)
APTT BLD: 42.3 SEC — HIGH (ref 27.5–35.5)
BUN SERPL-MCNC: 26 MG/DL — HIGH (ref 7–23)
CALCIUM SERPL-MCNC: 8.4 MG/DL — LOW (ref 8.5–10.1)
CHLORIDE SERPL-SCNC: 107 MMOL/L — SIGNIFICANT CHANGE UP (ref 96–108)
CO2 SERPL-SCNC: 26 MMOL/L — SIGNIFICANT CHANGE UP (ref 22–31)
CREAT SERPL-MCNC: 0.82 MG/DL — SIGNIFICANT CHANGE UP (ref 0.5–1.3)
CRP SERPL-MCNC: 16 MG/L — HIGH
EGFR: 81 ML/MIN/1.73M2 — SIGNIFICANT CHANGE UP
GLUCOSE SERPL-MCNC: 97 MG/DL — SIGNIFICANT CHANGE UP (ref 70–99)
HCT VFR BLD CALC: 28.6 % — LOW (ref 34.5–45)
HGB BLD-MCNC: 9.3 G/DL — LOW (ref 11.5–15.5)
INR BLD: 1.16 RATIO — SIGNIFICANT CHANGE UP (ref 0.88–1.16)
MCHC RBC-ENTMCNC: 31 PG — SIGNIFICANT CHANGE UP (ref 27–34)
MCHC RBC-ENTMCNC: 32.5 G/DL — SIGNIFICANT CHANGE UP (ref 32–36)
MCV RBC AUTO: 95.3 FL — SIGNIFICANT CHANGE UP (ref 80–100)
NRBC # BLD: 0 /100 WBCS — SIGNIFICANT CHANGE UP (ref 0–0)
PLATELET # BLD AUTO: 237 K/UL — SIGNIFICANT CHANGE UP (ref 150–400)
POTASSIUM SERPL-MCNC: 4.2 MMOL/L — SIGNIFICANT CHANGE UP (ref 3.5–5.3)
POTASSIUM SERPL-SCNC: 4.2 MMOL/L — SIGNIFICANT CHANGE UP (ref 3.5–5.3)
PROTHROM AB SERPL-ACNC: 13.8 SEC — HIGH (ref 10.5–13.4)
RBC # BLD: 3 M/UL — LOW (ref 3.8–5.2)
RBC # FLD: 13.7 % — SIGNIFICANT CHANGE UP (ref 10.3–14.5)
SODIUM SERPL-SCNC: 141 MMOL/L — SIGNIFICANT CHANGE UP (ref 135–145)
WBC # BLD: 16.83 K/UL — HIGH (ref 3.8–10.5)
WBC # FLD AUTO: 16.83 K/UL — HIGH (ref 3.8–10.5)

## 2022-07-12 PROCEDURE — 99233 SBSQ HOSP IP/OBS HIGH 50: CPT

## 2022-07-12 PROCEDURE — 99222 1ST HOSP IP/OBS MODERATE 55: CPT

## 2022-07-12 PROCEDURE — 93970 EXTREMITY STUDY: CPT | Mod: 26

## 2022-07-12 PROCEDURE — 99223 1ST HOSP IP/OBS HIGH 75: CPT

## 2022-07-12 RX ORDER — TRAZODONE HCL 50 MG
75 TABLET ORAL AT BEDTIME
Refills: 0 | Status: DISCONTINUED | OUTPATIENT
Start: 2022-07-12 | End: 2022-07-18

## 2022-07-12 RX ORDER — CLONAZEPAM 1 MG
1 TABLET ORAL
Qty: 0 | Refills: 0 | DISCHARGE

## 2022-07-12 RX ORDER — PIPERACILLIN AND TAZOBACTAM 4; .5 G/20ML; G/20ML
3.38 INJECTION, POWDER, LYOPHILIZED, FOR SOLUTION INTRAVENOUS EVERY 8 HOURS
Refills: 0 | Status: COMPLETED | OUTPATIENT
Start: 2022-07-12 | End: 2022-07-14

## 2022-07-12 RX ORDER — VANCOMYCIN HCL 1 G
VIAL (EA) INTRAVENOUS
Refills: 0 | Status: DISCONTINUED | OUTPATIENT
Start: 2022-07-12 | End: 2022-07-12

## 2022-07-12 RX ORDER — OXYCODONE HYDROCHLORIDE 5 MG/1
30 TABLET ORAL EVERY 6 HOURS
Refills: 0 | Status: DISCONTINUED | OUTPATIENT
Start: 2022-07-12 | End: 2022-07-18

## 2022-07-12 RX ORDER — TRAZODONE HCL 50 MG
75 TABLET ORAL
Qty: 0 | Refills: 0 | DISCHARGE

## 2022-07-12 RX ORDER — SODIUM CHLORIDE 9 MG/ML
1000 INJECTION, SOLUTION INTRAVENOUS
Refills: 0 | Status: DISCONTINUED | OUTPATIENT
Start: 2022-07-12 | End: 2022-07-14

## 2022-07-12 RX ORDER — PANTOPRAZOLE SODIUM 20 MG/1
40 TABLET, DELAYED RELEASE ORAL
Refills: 0 | Status: DISCONTINUED | OUTPATIENT
Start: 2022-07-12 | End: 2022-07-18

## 2022-07-12 RX ORDER — VANCOMYCIN HCL 1 G
1250 VIAL (EA) INTRAVENOUS EVERY 12 HOURS
Refills: 0 | Status: DISCONTINUED | OUTPATIENT
Start: 2022-07-13 | End: 2022-07-17

## 2022-07-12 RX ORDER — LEVOTHYROXINE SODIUM 125 MCG
88 TABLET ORAL DAILY
Refills: 0 | Status: DISCONTINUED | OUTPATIENT
Start: 2022-07-12 | End: 2022-07-18

## 2022-07-12 RX ORDER — VANCOMYCIN HCL 1 G
1250 VIAL (EA) INTRAVENOUS ONCE
Refills: 0 | Status: COMPLETED | OUTPATIENT
Start: 2022-07-12 | End: 2022-07-12

## 2022-07-12 RX ORDER — SODIUM CHLORIDE 9 MG/ML
1000 INJECTION, SOLUTION INTRAVENOUS ONCE
Refills: 0 | Status: COMPLETED | OUTPATIENT
Start: 2022-07-12 | End: 2022-07-12

## 2022-07-12 RX ORDER — DULOXETINE HYDROCHLORIDE 30 MG/1
60 CAPSULE, DELAYED RELEASE ORAL DAILY
Refills: 0 | Status: DISCONTINUED | OUTPATIENT
Start: 2022-07-12 | End: 2022-07-18

## 2022-07-12 RX ORDER — ACETAMINOPHEN 500 MG
1000 TABLET ORAL ONCE
Refills: 0 | Status: COMPLETED | OUTPATIENT
Start: 2022-07-12 | End: 2022-07-12

## 2022-07-12 RX ORDER — VANCOMYCIN HCL 1 G
1250 VIAL (EA) INTRAVENOUS ONCE
Refills: 0 | Status: DISCONTINUED | OUTPATIENT
Start: 2022-07-12 | End: 2022-07-12

## 2022-07-12 RX ORDER — GABAPENTIN 400 MG/1
100 CAPSULE ORAL DAILY
Refills: 0 | Status: DISCONTINUED | OUTPATIENT
Start: 2022-07-12 | End: 2022-07-18

## 2022-07-12 RX ADMIN — SODIUM CHLORIDE 100 MILLILITER(S): 9 INJECTION, SOLUTION INTRAVENOUS at 01:41

## 2022-07-12 RX ADMIN — OXYCODONE HYDROCHLORIDE 30 MILLIGRAM(S): 5 TABLET ORAL at 02:49

## 2022-07-12 RX ADMIN — Medication 166.67 MILLIGRAM(S): at 15:11

## 2022-07-12 RX ADMIN — DULOXETINE HYDROCHLORIDE 60 MILLIGRAM(S): 30 CAPSULE, DELAYED RELEASE ORAL at 12:49

## 2022-07-12 RX ADMIN — OXYCODONE HYDROCHLORIDE 30 MILLIGRAM(S): 5 TABLET ORAL at 21:23

## 2022-07-12 RX ADMIN — SODIUM CHLORIDE 1000 MILLILITER(S): 9 INJECTION, SOLUTION INTRAVENOUS at 00:10

## 2022-07-12 RX ADMIN — GABAPENTIN 100 MILLIGRAM(S): 400 CAPSULE ORAL at 12:50

## 2022-07-12 RX ADMIN — PIPERACILLIN AND TAZOBACTAM 25 GRAM(S): 4; .5 INJECTION, POWDER, LYOPHILIZED, FOR SOLUTION INTRAVENOUS at 18:45

## 2022-07-12 RX ADMIN — OXYCODONE HYDROCHLORIDE 30 MILLIGRAM(S): 5 TABLET ORAL at 22:23

## 2022-07-12 RX ADMIN — Medication 75 MILLIGRAM(S): at 21:24

## 2022-07-12 RX ADMIN — Medication 1 TABLET(S): at 12:50

## 2022-07-12 RX ADMIN — OXYCODONE HYDROCHLORIDE 30 MILLIGRAM(S): 5 TABLET ORAL at 10:11

## 2022-07-12 RX ADMIN — PIPERACILLIN AND TAZOBACTAM 25 GRAM(S): 4; .5 INJECTION, POWDER, LYOPHILIZED, FOR SOLUTION INTRAVENOUS at 05:39

## 2022-07-12 RX ADMIN — OXYCODONE HYDROCHLORIDE 30 MILLIGRAM(S): 5 TABLET ORAL at 09:11

## 2022-07-12 RX ADMIN — SODIUM CHLORIDE 1000 MILLILITER(S): 9 INJECTION, SOLUTION INTRAVENOUS at 01:05

## 2022-07-12 RX ADMIN — Medication 1000 MILLIGRAM(S): at 12:49

## 2022-07-12 RX ADMIN — Medication 600 MILLIGRAM(S): at 00:13

## 2022-07-12 RX ADMIN — Medication 1000 MILLIGRAM(S): at 13:49

## 2022-07-12 RX ADMIN — Medication 88 MICROGRAM(S): at 05:40

## 2022-07-12 RX ADMIN — OXYCODONE HYDROCHLORIDE 30 MILLIGRAM(S): 5 TABLET ORAL at 03:49

## 2022-07-12 RX ADMIN — PANTOPRAZOLE SODIUM 40 MILLIGRAM(S): 20 TABLET, DELAYED RELEASE ORAL at 08:08

## 2022-07-12 NOTE — H&P ADULT - NSHPLABSRESULTS_GEN_ALL_CORE
Recent Vitals  T(C): 37.3 (22 @ 00:11), Max: 39.6 (22 @ 20:48)  HR: 85 (22 @ 00:11) (85 - 120)  BP: 98/53 (22 @ 00:37) (90/56 - 113/73)  RR: 20 (22 @ 00:11) (20 - 24)  SpO2: 96% (22 @ 00:11) (96% - 97%)                        10.2   15.50 )-----------( 264      ( 2022 21:00 )             31.5     07    137  |  102  |  34<H>  ----------------------------<  93  4.5   |  25  |  1.03    Ca    8.3<L>      2022 21:00    TPro  6.4  /  Alb  3.0<L>  /  TBili  0.4  /  DBili  x   /  AST  15  /  ALT  14  /  AlkPhos  72  07-11    PT/INR - ( 2022 21:00 )   PT: 13.8 sec;   INR: 1.15 ratio         PTT - ( 2022 21:00 )  PTT:35.2 sec  LIVER FUNCTIONS - ( 2022 21:00 )  Alb: 3.0 g/dL / Pro: 6.4 gm/dL / ALK PHOS: 72 U/L / ALT: 14 U/L / AST: 15 U/L / GGT: x           Urinalysis Basic - ( 2022 21:24 )    Color: Yellow / Appearance: Clear / S.010 / pH: x  Gluc: x / Ketone: Negative  / Bili: Negative / Urobili: Negative mg/dL   Blood: x / Protein: Negative mg/dL / Nitrite: Negative   Leuk Esterase: Negative / RBC: 0-2 /HPF / WBC Negative   Sq Epi: x / Non Sq Epi: Occasional / Bacteria: x        Home Medications:  ascorbic acid 500 mg oral tablet: 1 tab(s) orally 2 times a day (2022 10:57)  baclofen 20 mg oral tablet: 1 tab(s) orally 2 times a day (2022 06:34)  celecoxib 200 mg oral capsule: 1 cap(s) orally every 12 hours (2022 10:57)  clonazePAM 1 mg oral tablet: 1 tab(s) orally 3 times a day, As Needed (:34)  Cymbalta 60 mg oral delayed release capsule: 1 cap(s) orally once a day (at bedtime) (:34)  gabapentin 100 mg oral capsule: orally once a day (:34)  magnesium oxide 500 mg oral tablet: 1 tab(s) orally once a day (:34)  Movantik 25 mg oral tablet: 1 tab(s) orally once a day (in the morning), As Needed (:34)  Multiple Vitamins oral tablet: 1 tab(s) orally once a day (2022 10:57)  omeprazole 40 mg oral delayed release capsule: 1 cap(s) orally once a day, As Needed (:34)  oxyCODONE 30 mg oral tablet: 1 tab(s) orally every 4 hours, As needed, pain 4-10 (2022 10:57)  Probiotic Formula oral capsule: 1 cap(s) orally once a day (:34)  rivaroxaban 10 mg oral tablet: 1 tab(s) orally once a day for 35 days post op to prevent blood clots (2022 10:57)  Singulair 10 mg oral tablet: 2  orally 2 times a day, As Needed (:34)  traZODone: 75  orally once a day (at bedtime) (:34)

## 2022-07-12 NOTE — H&P ADULT - NSHPPHYSICALEXAM_GEN_ALL_CORE
Physical exam:  General: patient in no acute distress, resting comfortably  Head:  Atraumatic, Normocephalic  Eyes: EOMI, PERRLA, clear sclera  Neck: Supple, thyroid nontender, non enlarged  Cardio: S1/S2 +ve, regular rate and rhythm, no M/G/R  Resp: clear to ausculation bilaterally, no rales or wheezes  GI: abdomen soft, nontender, non distended, no guarding, BS +ve x 4  Ext: significant swelling and warmth to the R hip.  No erythema.  No pus draining from the surgical wound  Neuro: CN 2-12 intact, no significant motor or sensory deficits.  Skin: No rashes or lesions

## 2022-07-12 NOTE — H&P ADULT - HISTORY OF PRESENT ILLNESS
Patient is a 62F with a PMH of hypothyroidism, Migraine s/p Lumbar puncture [id number], Cesar Esophagus, anxiety/depression, chronic low back pain, Subarachnoid hemorrhage s/p clipping 1989 who presents to the ED for hip pain.  Patient recently discharged from [facility] to [facility] after she had a R THR.  Patient states she was doing well with physical therapy until around four days ago.  Patient noted increased swelling and pain from the R hip.  Patient states that over the last four days she has difficulty ambulating and now has trouble standing on the joint.  Patient also reports fever earlier today, brought to the ED for evaluation.  Patient currently complains of hip pain and frontal headache.  States the headache has been constant for four days.  Radiates down to her eyes at times.  No other complaints.  Reports recent colposcopy.  Also received antibiotics for a UTI.  Febrile in ED to 103.2, labs show leukocytosis.  Will admit to med surg.

## 2022-07-12 NOTE — H&P ADULT - PROBLEM SELECTOR PLAN 1
Febrile in ED with leukocytosis.    UA negative, CXR without significant infiltrates  Significant swelling to the R hip - ortho team on board - possible OR for aspiration  Started on Zosyn and Vanco in the ED, will continue.  Follow blood cultures - de-escalate antibiotics as needed   ID consult in [place] - [place]   NPO as per surgical team.  SCDs for DVT prop Febrile in ED with leukocytosis.    UA negative, CXR without significant infiltrates  Significant swelling to the R hip - ortho team on board - possible OR for aspiration  Started on Zosyn and Vanco in the ED, will continue.  Follow blood cultures - de-escalate antibiotics as needed   ID consult in [place] - [place]   NPO as per surgical team.  SCDs for DVT prop  Patient currently medically optimized, OR aspiration would likely be beneficial - septic or otherwise

## 2022-07-12 NOTE — H&P ADULT - ASSESSMENT
Patient is a 62F with a PMH of hypothyroidism, Migraine s/p Lumbar puncture [id number], Cesar Esophagus, anxiety/depression, chronic low back pain, Subarachnoid hemorrhage s/p clipping 1989 who presents to the ED for hip pain.  Patient recently discharged from [facility] to [facility] after she had a R THR.  Patient states she was doing well with physical therapy until around four days ago.  Patient noted increased swelling and pain from the R hip.  Patient also reports fever earlier today, brought to the ED for evaluation.  Patient currently complains of hip pain and frontal headache.  States the headache has been constant for four days.  Radiates down to her eyes at times.  No other complaints.  Reports recent colposcopy.  Also received antibiotics for a UTI.  Febrile in ED to 103.2, labs show leukocytosis.  Will admit to med surg.     IMPROVE VTE Individual Risk Assessment          RISK                                                          Points  [  ] Previous VTE                                                3  [  ] Thrombophilia                                             2  [  ] Lower limb paralysis                                    2        (unable to hold up >15 seconds)    [  ] Current Cancer                                             2         (within 6 months)  [  ] Immobilization > 24 hrs                              1  [  ] ICU/CCU stay > 24 hours                            1  [  ] Age > 60                                                    1    IMPROVE VTE Score - 1

## 2022-07-12 NOTE — H&P ADULT - NSHPREVIEWOFSYSTEMS_GEN_ALL_CORE
Constitutional: fever, chills positive.  No night sweats  Ears: no hearing changes or ear pain,   Nose: no nasal congestion, sinus pain, or rhinorrhea  Cardio: no chest pain, orthopnea, edema, or palpitations  Resp: no dyspnea, cough, wheezing  GI: no nausea, vomiting, diarrhea, constipation, hematochezia, or melena  : no dysuria, urinary frequency, hematuria  MSK: no back pain, neck pain  Skin: no rash, pruritis   Neuro: no weakness, dizziness, lightheadedness, syncope   Heme/Lymph: swelling to R hip

## 2022-07-12 NOTE — CONSULT NOTE ADULT - ASSESSMENT
62F with hip pain and acutely inflamed surgical site.   No localizing complaints/findings otherwise for source of fever  Appearance nonspecific but looks lymphangitic- Strep/seroma?  R/O PJI though ESR not impressive    Suggestions--  Needs drainage-- I've paged ortho  Add back vanco  Watch kidney function in the setting of zosyn use  Target vanco trough 15-20  F/U Cx data  Trend CBC, inflammatory markers  Left message for Dr. Singh  D/W patient  Thank you for the courtesy of this referral.  Jonathan Anglin MD  Attending Physician  [facility]  Division of Infectious Diseases  [phone]

## 2022-07-12 NOTE — PATIENT PROFILE ADULT - FUNCTIONAL ASSESSMENT - BASIC MOBILITY 6.
4-calculated by average/Not able to assess (calculate score using [facility] averaging method)

## 2022-07-12 NOTE — PROGRESS NOTE ADULT - SUBJECTIVE AND OBJECTIVE BOX
Patient seen and examined at bedside this AM.  No acute complaints at this time. Pain well controlled. Denies chest pain, shortness of breath, nausea or vomiting. Denies f/c. Denies numbness/tingling LLE. Patient reports that she fells much improved and was able to sleep last night.     PE:  Vital Signs Last 24 Hrs  T(C): 36.6 (07-12-22 @ 05:04), Max: 39.6 (07-11-22 @ 20:48)  T(F): 97.9 (07-12-22 @ 05:04), Max: 103.2 (07-11-22 @ 20:48)  HR: 69 (07-12-22 @ 05:04) (69 - 120)  BP: 100/65 (07-12-22 @ 05:04) (90/56 - 113/73)  BP(mean): 70 (07-12-22 @ 00:11) (70 - 70)  RR: 18 (07-12-22 @ 05:04) (16 - 24)  SpO2: 100% (07-12-22 @ 05:04) (96% - 100%)    General: NAD, resting comfortably in bed  RLE:     Right hip:     No dressing in place  Healing Surgical incision site  mild erythema proximally and distally  No active drainage  No expressible purulence  +mild-moderate swelling of thigh   no palpable effusion  no ecchymosis  warm, however entire body warm to touch  +TTP over GT  NTTP over the bony prominences of the knee/ankle/foot  painless active ROM of the knee/ankle/foot  Unable to SLR  pROM R Hip limited 2' pain  No pain with axial loading or log roll  Calves are soft and nontender  Compartments soft and compressible  +flexors/quads/hams/TA/EHL/FHL/GSC  SILT L3-S1  DP pulses palpable  Extremity warm / well perfused                            10.2   15.50 )-----------( 264      ( 11 Jul 2022 21:00 )             31.5     11 Jul 2022 21:00    137    |  102    |  34     ----------------------------<  93     4.5     |  25     |  1.03     Ca    8.3        11 Jul 2022 21:00    TPro  6.4    /  Alb  3.0    /  TBili  0.4    /  DBili  x      /  AST  15     /  ALT  14     /  AlkPhos  72     11 Jul 2022 21:00    PT/INR - ( 11 Jul 2022 21:00 )   PT: 13.8 sec;   INR: 1.15 ratio         PTT - ( 11 Jul 2022 21:00 )  PTT:35.2 sec            Assessment:  62y Female with r/o Right prosthetic hip joint infection, s/p aTHA 6/29 w/ Dr. Cortez  vs Post operative seroma         Plan:  Will tentatively preop for OR tomorrow 7/12 w/ Dr. Cortez for possible I&D, polyethylene liner exchange, pending fever work up / labs / imaging  Will make NPO and hold chemical anticoagulation after midnight.  IVF while NPO.  Hold DVT/PE prophylaxis for now, for possible OR  In ED Pt TMax 103.2, , /73, RR 24 --> Vanc/Zosyn given per ED  Repeat vitals improved   Would recommend holding ABx at present for possible OR to obtain cx, however if patient becomes clinically unstable would give   Pt advised that surgical intervention might be needed for possible right periprosthetic hip infection, surgical procedure discussed in detail including all risks, benefits and alternatives  pt expresses understanding and consent for procedure  Plan for possible OR 7/12  f/u medical optimization for possible OR.  f/u labs/imaging  FU ESR/CRP  FU BCx  FU UCx  UA: Small Blood  COVID Neg 7/11  FU CXR/EKG  Likely will need ID C/s  Pain control prn  Ice application  DVT prophylaxis with SCDs for now  WBAT  Medical mgmt appreciated  Dr. Cortez to evaluate patient in AM and assess need for surgical intervention pending complete workup  Case discussed with and plan as per Dr. Cortez

## 2022-07-12 NOTE — CHART NOTE - NSCHARTNOTEFT_GEN_A_CORE
Chart reviewed. Patient is a 62F with a PMH of hypothyroidism, Migraine s/p Lumbar puncture [id number], Cesar Esophagus, anxiety/depression, chronic low back pain, Subarachnoid hemorrhage s/p clipping 1989 who presents to the ED for hip pain.  Patient recently discharged from [facility] to [facility] after she had a R THR.  Patient states she was doing well with physical therapy until around four days ago.  Patient noted increased swelling and pain from the R hip.  Patient also reports fever earlier today, brought to the ED for evaluation.  Patient currently complains of hip pain and frontal headache.  States the headache has been constant for four days.  Radiates down to her eyes at times.  No other complaints.  Reports recent colposcopy.  Also received antibiotics for a UTI.  Febrile in ED to 103.2, labs show leukocytosis.  Will admit to med surg.     7/12/22 - Pt seen and examined at bedside. denies R hip pain or incisional discomfort at present. Appears edematous, however no overt purulence or bloody discharge noted. scant ecchymosis noted R lateral thigh. CRP elevated at 16, ESR elevated at 25, WBC increased from 15K - 16K. trend cbc, temp curves. ID Dr. Anglin following, plan to restart vanco and rec drainage, cx to obtained. Will resume diet today as no plan for immediate OR, defer to ortho to make pt NPO once they plan on an intervention. IR procedure noted -- possible US guided drainage planned.        Problem/Plan - 1:  ·  Problem: Fever.   ·  Plan: Febrile in ED with leukocytosis.    UA negative, CXR without significant infiltrates  Significant swelling to the R hip - ortho team on board - possible OR for aspiration  Started on Zosyn and Vanco in the ED, will continue.  Follow blood cultures - de-escalate antibiotics as needed   ID consult in [place] - [place]   NPO as per surgical team.  SCDs for DVT prop  Patient currently medically optimized, OR aspiration would likely be beneficial - septic or otherwise.     Problem/Plan - 2:  ·  Problem: Septic arthritis.   ·  Plan: as per above.     Problem/Plan - 3:  ·  Problem: Chronic anxiety.   ·  Plan: cymbalta, trazodone.     Problem/Plan - 4:  ·  Problem: Hypothyroidism.   ·  Plan: synthroid. Chart reviewed. Patient is a 62F with a PMH of hypothyroidism, Migraine s/p Lumbar puncture [phone], Cesar Esophagus, anxiety/depression, chronic low back pain, Subarachnoid hemorrhage s/p clipping 1989 who presents to the ED for hip pain.  Patient recently discharged from [facility] to [facility] after she had a R THR.  Patient states she was doing well with physical therapy until around four days ago.  Patient noted increased swelling and pain from the R hip.  Patient also reports fever earlier today, brought to the ED for evaluation.  Patient currently complains of hip pain and frontal headache.  States the headache has been constant for four days.  Radiates down to her eyes at times.  No other complaints.  Reports recent colposcopy.  Also received antibiotics for a UTI.  Febrile in ED to 103.2, labs show leukocytosis.  admitted to med surg.     7/12/22 - Pt seen and examined at bedside. denies R hip pain or incisional discomfort at present. Appears edematous, however no overt purulence or bloody discharge noted. scant ecchymosis noted R lateral thigh. CRP elevated at 16, ESR elevated at 25, WBC increased from 15K - 16K. trend cbc, temp curves. ID Dr. Anglin following, plan to restart vanco and rec drainage, cx to obtained. Will resume diet today as no plan for immediate OR, defer to ortho to make pt NPO once they plan on an intervention. IR procedure noted -- possible US guided drainage planned.        Problem/Plan - 1:  ·  Problem: Fever.   ·  Plan: Febrile in ED with leukocytosis.    UA negative, CXR without significant infiltrates  Significant swelling to the R hip - ortho team on board - possible OR for aspiration  Started on Zosyn and Vanco in the ED, will continue.  Follow blood cultures - de-escalate antibiotics as needed   ID consult in [place] - [place]   NPO as per surgical team.  SCDs for DVT prop  Patient currently medically optimized, OR aspiration would likely be beneficial - septic or otherwise.     Problem/Plan - 2:  ·  Problem: Septic arthritis.   ·  Plan: as per above.     Problem/Plan - 3:  ·  Problem: Chronic anxiety.   ·  Plan: cymbalta, trazodone.     Problem/Plan - 4:  ·  Problem: Hypothyroidism.   ·  Plan: synthroid. Chart reviewed. Patient is a 62F with a PMH of hypothyroidism, Migraine s/p Lumbar puncture [phone], Cesar Esophagus, anxiety/depression, chronic low back pain, Subarachnoid hemorrhage s/p clipping 1989 who presents to the ED for hip pain.  Patient recently discharged from [facility] to [facility] after she had a R THR.  Patient states she was doing well with physical therapy until around four days ago.  Patient noted increased swelling and pain from the R hip.  Patient also reports fever earlier today, brought to the ED for evaluation.  Patient currently complains of hip pain and frontal headache.  States the headache has been constant for four days.  Radiates down to her eyes at times.  No other complaints.  Reports recent colposcopy.  Also received antibiotics for a UTI.  Febrile in ED to 103.2, labs show leukocytosis.  admitted to med surg.     7/12/22 - Pt seen and examined at bedside. denies R hip pain or incisional discomfort at present. R anterior hip appears edematous, however no overt purulence or bloody discharge noted. scant ecchymosis noted R lateral thigh. CRP elevated at 16, ESR elevated at 25, WBC increased from 15K - 16K. trend cbc, temp curves. ID Dr. Anglin following, plan to restart vanco and rec drainage, cx to obtained. Will resume diet today as no plan for immediate OR, defer to ortho to make pt NPO once they plan on an intervention. IR procedure noted -- possible US guided drainage planned.        Problem/Plan - 1:  ·  Problem: Fever.   ·  Plan: Febrile in ED with leukocytosis.    UA negative, CXR without significant infiltrates  Significant swelling to the R hip - ortho team on board - possible OR for aspiration  Started on Zosyn and Vanco in the ED, will continue.  Follow blood cultures - de-escalate antibiotics as needed   ID consult in [place] - [place]   NPO as per surgical team.  SCDs for DVT prop  Patient currently medically optimized, OR aspiration would likely be beneficial - septic or otherwise.     Problem/Plan - 2:  ·  Problem: Septic arthritis.   ·  Plan: as per above.     Problem/Plan - 3:  ·  Problem: Chronic anxiety.   ·  Plan: cymbalta, trazodone.     Problem/Plan - 4:  ·  Problem: Hypothyroidism.   ·  Plan: synthroid.

## 2022-07-12 NOTE — CONSULT NOTE ADULT - SUBJECTIVE AND OBJECTIVE BOX
Full note to follow  Hip pain beginning 7/7, followed by fever/chills/rigors.  R anterior approach incison w minimal serous drainage, marked inflammatory changes.   Appearance nonspecific but looks lymphangitic- Strep/seroma?  R/O PJI  Needs drainage-- I've paged ortho  Add back vanco  Watch kidney function in the setting of zosyn use  Target vanco trough 15-20  F/U Cx data  Trend CBC, inflammatory markers  Left message for Dr. Singh  D/W patient  Thank you for the courtesy of this referral.  Jonathan Anglin MD  Attending Physician  [facility]  Division of Infectious Diseases  [phone]  ----------------  [facility]  Division of Infectious Diseases  [phone]    PILAR STAFFORD  62y, Female  [id number]    HPI--      PMH/PSH--  Bipolar disorder    Hypothyroidism    Chronic radicular low back pain    Arthritis of right hip    Migraines    History of Castro&#x27;s esophagus    Osteoarthritis    Arthritis    COVID-19    History of hysteroscopy    S/P brain surgery    Brain aneurysm    H/O subarachnoid hemorrhage    History of lumbar puncture    H/O colonoscopy    S/P endoscopy        Allergies--  No Known Allergies      Medications--  Antibiotics: piperacillin/tazobactam IVPB.. 3.375 Gram(s) IV Intermittent every 8 hours    Immunologic:   Other: DULoxetine  gabapentin  lactated ringers.  levothyroxine  multivitamin  oxyCODONE    IR PRN  pantoprazole    Tablet  traZODone    Antimicrobials last 90 days per EMR: MEDICATIONS  (STANDING):  piperacillin/tazobactam IVPB..   25 mL/Hr IV Intermittent (07-12-22 @ 05:39)    piperacillin/tazobactam IVPB...   200 mL/Hr IV Intermittent (07-11-22 @ 21:22)    vancomycin  IVPB.   250 mL/Hr IV Intermittent (07-11-22 @ 22:42)        Social History--  EtOH: denies   Tobacco: denies   Drug Use: denies     Family/Marital History--  FH: breast cancer (Mother)          Travel/Environmental/Occupational History:      Review of Systems:  A >=10-point review of systems was obtained.     Pertinent positives and negatives--  Constitutional: No fevers. No Chills. No Rigors.   Eyes:  ENMT:  Cardiovascular: No chest pain. No palpitations.  Respiratory: No shortness of breath. No cough.  Gastrointestinal: No nausea or vomiting. No diarrhea or constipation.   Genitourinary:  Musculoskeletal:  Skin:  Neurologic:  Psychiatric: Pleasant. Appropriate affect.  Endocrine:  Heme/Lymphatic:  Allergy/Immunologic:    Review of systems otherwise negative except as previously noted.    Physical Exam--  Vital Signs: T(F): 99.2 (07-12-22 @ 10:26), Max: 103.2 (07-11-22 @ 20:48)  HR: 84 (07-12-22 @ 10:26)  BP: 109/75 (07-12-22 @ 10:26)  RR: 18 (07-12-22 @ 10:26)  SpO2: 99% (07-12-22 @ 10:26)  Wt(kg): --  General: Nontoxic-appearing Female in no acute distress.  HEENT: AT/NC. PERRL. EOMI. Anicteric. Conjunctiva pink and moist. Oropharynx clear. Dentition fair.  Neck: Not rigid. No sense of mass.  Nodes: None palpable.  Lungs: Clear bilaterally without rales, wheezing or rhonchi  Heart: Regular rate and rhythm. No Murmur. No rub. No gallop. No palpable thrill.  Abdomen: Bowel sounds present and normoactive. Soft. Nondistended. Nontender. No sense of mass. No organomegaly.  Back: No spinal tenderness. No costovertebral angle tenderness.   Extremities: No cyanosis or clubbing. No edema.   Skin: Warm. Dry. Good turgor. No rash. No vasculitic stigmata.  Psychiatric: Appropriate affect and mood for situation.         Laboratory & Imaging Data--  CBC                        9.3    16.83 )-----------( 237      ( 12 Jul 2022 05:52 )             28.6       Chemistries  07-12    141  |  107  |  26<H>  ----------------------------<  97  4.2   |  26  |  0.82    Ca    8.4<L>      12 Jul 2022 05:52    TPro  6.4  /  Alb  3.0<L>  /  TBili  0.4  /  DBili  x   /  AST  15  /  ALT  14  /  AlkPhos  72  07-11      Culture Data       Full note to follow  Hip pain beginning 7/7, followed by fever/chills/rigors.  R anterior approach incison w minimal serous drainage, marked inflammatory changes.   Appearance nonspecific but looks lymphangitic- Strep/seroma?  R/O PJI  Needs drainage-- I've paged ortho  Add back vanco  Watch kidney function in the setting of zosyn use  Target vanco trough 15-20  F/U Cx data  Trend CBC, inflammatory markers  Left message for Dr. Singh  D/W patient  Thank you for the courtesy of this referral.  Jonathan Anglin MD  Attending Physician  [facility]  Division of Infectious Diseases  [phone]  ----------------  [facility]  Division of Infectious Diseases  [phone]    PILAR STAFFORD  62y, Female  [id number]    HPI--  62F with PMH Castro's esophagus, bipolar d/o and borderline traits per prior psych consult, SAH/CNS aneurysm s/p clipping, who underwent R THR on 6/29 for OA and presents back to the hospital with fever and R hip pain. Patient states the pain began 7/7 ad was followed by fevers, chills, or rigors. CT here with collection, though no clear joint involvement. Patient denies trauma. No other localizing complaints.     PMH/PSH--  Bipolar disorder    Hypothyroidism    Chronic radicular low back pain    Arthritis of right hip    Migraines    History of Castro&#x27;s esophagus    Osteoarthritis    Arthritis    COVID-19    History of hysteroscopy    S/P brain surgery    Brain aneurysm    H/O subarachnoid hemorrhage    History of lumbar puncture    H/O colonoscopy    S/P endoscopy        Allergies--  No Known Allergies      Medications--  Antibiotics: piperacillin/tazobactam IVPB.. 3.375 Gram(s) IV Intermittent every 8 hours    Immunologic:   Other: DULoxetine  gabapentin  lactated ringers.  levothyroxine  multivitamin  oxyCODONE    IR PRN  pantoprazole    Tablet  traZODone    Antimicrobials last 90 days per EMR: MEDICATIONS  (STANDING):  piperacillin/tazobactam IVPB..   25 mL/Hr IV Intermittent (07-12-22 @ 05:39)    piperacillin/tazobactam IVPB...   200 mL/Hr IV Intermittent (07-11-22 @ 21:22)    vancomycin  IVPB.   250 mL/Hr IV Intermittent (07-11-22 @ 22:42)        Social History--  EtOH: rare  Tobacco: prior  Drug Use: denies     Family/Marital History--  FH: breast cancer (Mother)        Review of Systems:  A >=10-point review of systems was obtained.   Review of systems otherwise negative except as previously noted.    Physical Exam--  Vital Signs: T(F): 99.2 (07-12-22 @ 10:26), Max: 103.2 (07-11-22 @ 20:48)  HR: 84 (07-12-22 @ 10:26)  BP: 109/75 (07-12-22 @ 10:26)  RR: 18 (07-12-22 @ 10:26)  SpO2: 99% (07-12-22 @ 10:26)  Wt(kg): --  General: Nontoxic-appearing Female in no acute distress.  HEENT: AT/NC. Anicteric. Conjunctiva pink and moist. Oropharynx clear.  Neck: Not rigid. No sense of mass.  Nodes: None palpable.  Lungs: Clear bilaterally without rales, wheezing or rhonchi  Heart: Regular rate and rhythm. No Murmur. No rub. No gallop. No palpable thrill.  Abdomen: Bowel sounds present and normoactive. Soft. Nondistended. Nontender. No sense of mass. No organomegaly.  Back: No spinal tenderness. No costovertebral angle tenderness.   Extremities: No cyanosis or clubbing. R anterior approach incison w minimal serous drainage, marked inflammatory changes. Tender, warm, red, with lymphangitic appearance  Skin: Warm. Dry. Good turgor. No rash. No vasculitic stigmata.  Psychiatric: Grossly appropriate affect and mood for situation.       Laboratory & Imaging Data--  CBC                        9.3    16.83 )-----------( 237      ( 12 Jul 2022 05:52 )             28.6     Sedimentation Rate, Erythrocyte: 25 mm/hr (07.11.22 @ 21:00)  C-Reactive Protein, Serum: 16 mg/L (07.11.22 @ 21:00)        Chemistries  07-12    141  |  107  |  26<H>  ----------------------------<  97  4.2   |  26  |  0.82    Ca    8.4<L>      12 Jul 2022 05:52    TPro  6.4  /  Alb  3.0<L>  /  TBili  0.4  /  DBili  x   /  AST  15  /  ALT  14  /  AlkPhos  72  07-11    Urinalysis (07.11.22 @ 21:24)    Glucose Qualitative, Urine: Negative mg/dL    Blood, Urine: Small    pH Urine: 5.0    Color: Yellow    Urine Appearance: Clear    Bilirubin: Negative    Ketone - Urine: Negative    Specific Gravity: 1.010    Protein, Urine: Negative mg/dL    Urobilinogen: Negative mg/dL    Nitrite: Negative    Leukocyte Esterase Concentration: Negative  Urine Microscopic-Add On (NC) (07.11.22 @ 21:24)    Red Blood Cell - Urine: 0-2 /HPF    White Blood Cell - Urine: Negative    Epithelial Cells: Occasional        Culture Data  None    < from: Xray Chest 1 View- PORTABLE-Urgent (07.11.22 @ 22:16) >    IMPRESSION:  Clear lungs.      < end of copied text >      < from: CT Abdomen and Pelvis w/ IV Cont (07.11.22 @ 22:21) >    ACC: [id number] EXAM:  CT ABDOMEN AND PELVIS IC                          PROCEDURE DATE:  07/11/2022          INTERPRETATION:  CLINICAL INFORMATION: Status post right total hip   replacement, drainage from surgical site, rule out infection.    COMPARISON: None.    CONTRAST/COMPLICATIONS:  IV Contrast: Omnipaque 350  85 cc administered   4 cc discarded  Oral Contrast: NONE  Complications: None reported at time of study completion    PROCEDURE:  CT of the Abdomen and Pelvis was performed.  [place] coronal reformats were performed.    FINDINGS:  LOWER CHEST: Within normal limits.    LIVER: Within normal limits.  BILE DUCTS: Normal caliber.  GALLBLADDER: Within normal limits.  SPLEEN: Within normal limits.  PANCREAS: Within normal limits.  ADRENALS: Within normal limits.  KIDNEYS/URETERS: Within normal limits. Left renal cysts measuring 1.2 and   2.8 cm. Subcentimeter hypodense renal lesion, too small to characterize,   possibly tiny cyst.    BLADDER: Mildly compromised evaluation secondary to streak artifact from   adjacent right hip prosthesis. Grossly normal.  REPRODUCTIVE ORGANS: Uterus and adnexa within normal limits.    BOWEL: No bowel obstruction.  PERITONEUM: No ascites.  VESSELS: Within normal limits. Mild atherosclerotic disease.  RETROPERITONEUM/LYMPH NODES: No lymphadenopathy.  ABDOMINAL WALL: Soft tissue stranding in the anterior soft tissues of the   right hip presumably related to recent total hip arthroplasty. Adjacent   of the loculated fluid collection measures approximately 2.2 x 4.1 x 1.3   cm (AP by transverse by CC); no definite associated peripheral   enhancement. Findings suggestive of postoperative fluid/seroma, very   early abscess formation is less likely.  BONES: Right hip arthroplasty without evidence of acute hardware   complication. Spinal degenerative changes. No acute osseous findings.   Anterolisthesis L4 on L5 likely degenerative.    IMPRESSION:  Status post right hip arthroplasty. Soft tissue stranding in the anterior   soft tissues with adjacent loculated fluid collection measuring up to 4.1   cm. No definite associated peripheral contrast enhancement. Findings are   suggestive of postoperative fluid/seroma, early abscess formation is less   likely.    --- End of Report ---            SILAS BONILLA MD; Attending Radiologist  This document has been electronically signed. Jul 11 2022 11:33PM    < end of copied text >

## 2022-07-13 ENCOUNTER — TRANSCRIPTION ENCOUNTER (OUTPATIENT)
Age: 62
End: 2022-07-13

## 2022-07-13 ENCOUNTER — APPOINTMENT (OUTPATIENT)
Dept: ORTHOPEDIC SURGERY | Facility: CLINIC | Age: 62
End: 2022-07-13

## 2022-07-13 DIAGNOSIS — T84.50XA INFECTION AND INFLAMMATORY REACTION DUE TO UNSPECIFIED INTERNAL JOINT PROSTHESIS, INITIAL ENCOUNTER: ICD-10-CM

## 2022-07-13 LAB
ANION GAP SERPL CALC-SCNC: 4 MMOL/L — LOW (ref 5–17)
APTT BLD: 40.3 SEC — HIGH (ref 27.5–35.5)
BASOPHILS # BLD AUTO: 0.03 K/UL — SIGNIFICANT CHANGE UP (ref 0–0.2)
BASOPHILS NFR BLD AUTO: 0.3 % — SIGNIFICANT CHANGE UP (ref 0–2)
BUN SERPL-MCNC: 13 MG/DL — SIGNIFICANT CHANGE UP (ref 7–23)
CALCIUM SERPL-MCNC: 8.4 MG/DL — LOW (ref 8.5–10.1)
CHLORIDE SERPL-SCNC: 105 MMOL/L — SIGNIFICANT CHANGE UP (ref 96–108)
CO2 SERPL-SCNC: 30 MMOL/L — SIGNIFICANT CHANGE UP (ref 22–31)
CREAT SERPL-MCNC: 0.78 MG/DL — SIGNIFICANT CHANGE UP (ref 0.5–1.3)
CULTURE RESULTS: SIGNIFICANT CHANGE UP
EGFR: 86 ML/MIN/1.73M2 — SIGNIFICANT CHANGE UP
EOSINOPHIL # BLD AUTO: 0.24 K/UL — SIGNIFICANT CHANGE UP (ref 0–0.5)
EOSINOPHIL NFR BLD AUTO: 2.3 % — SIGNIFICANT CHANGE UP (ref 0–6)
GLUCOSE SERPL-MCNC: 99 MG/DL — SIGNIFICANT CHANGE UP (ref 70–99)
GRAM STN FLD: SIGNIFICANT CHANGE UP
HCT VFR BLD CALC: 26.5 % — LOW (ref 34.5–45)
HGB BLD-MCNC: 8.7 G/DL — LOW (ref 11.5–15.5)
IMM GRANULOCYTES NFR BLD AUTO: 0.7 % — SIGNIFICANT CHANGE UP (ref 0–1.5)
INR BLD: 1.16 RATIO — SIGNIFICANT CHANGE UP (ref 0.88–1.16)
LYMPHOCYTES # BLD AUTO: 1.33 K/UL — SIGNIFICANT CHANGE UP (ref 1–3.3)
LYMPHOCYTES # BLD AUTO: 12.9 % — LOW (ref 13–44)
MCHC RBC-ENTMCNC: 31 PG — SIGNIFICANT CHANGE UP (ref 27–34)
MCHC RBC-ENTMCNC: 32.8 G/DL — SIGNIFICANT CHANGE UP (ref 32–36)
MCV RBC AUTO: 94.3 FL — SIGNIFICANT CHANGE UP (ref 80–100)
MONOCYTES # BLD AUTO: 0.8 K/UL — SIGNIFICANT CHANGE UP (ref 0–0.9)
MONOCYTES NFR BLD AUTO: 7.7 % — SIGNIFICANT CHANGE UP (ref 2–14)
NEUTROPHILS # BLD AUTO: 7.87 K/UL — HIGH (ref 1.8–7.4)
NEUTROPHILS NFR BLD AUTO: 76.1 % — SIGNIFICANT CHANGE UP (ref 43–77)
NRBC # BLD: 0 /100 WBCS — SIGNIFICANT CHANGE UP (ref 0–0)
PLATELET # BLD AUTO: 199 K/UL — SIGNIFICANT CHANGE UP (ref 150–400)
POTASSIUM SERPL-MCNC: 3.7 MMOL/L — SIGNIFICANT CHANGE UP (ref 3.5–5.3)
POTASSIUM SERPL-SCNC: 3.7 MMOL/L — SIGNIFICANT CHANGE UP (ref 3.5–5.3)
PROTHROM AB SERPL-ACNC: 13.9 SEC — HIGH (ref 10.5–13.4)
RBC # BLD: 2.81 M/UL — LOW (ref 3.8–5.2)
RBC # FLD: 13.7 % — SIGNIFICANT CHANGE UP (ref 10.3–14.5)
SODIUM SERPL-SCNC: 139 MMOL/L — SIGNIFICANT CHANGE UP (ref 135–145)
SPECIMEN SOURCE: SIGNIFICANT CHANGE UP
SPECIMEN SOURCE: SIGNIFICANT CHANGE UP
VANCOMYCIN TROUGH SERPL-MCNC: 16.9 UG/ML — SIGNIFICANT CHANGE UP (ref 10–20)
WBC # BLD: 10.34 K/UL — SIGNIFICANT CHANGE UP (ref 3.8–10.5)
WBC # FLD AUTO: 10.34 K/UL — SIGNIFICANT CHANGE UP (ref 3.8–10.5)

## 2022-07-13 PROCEDURE — 10030 IMG GID FLU COLL DRG SFT TIS: CPT

## 2022-07-13 PROCEDURE — 99233 SBSQ HOSP IP/OBS HIGH 50: CPT

## 2022-07-13 PROCEDURE — 11044 DBRDMT BONE 1ST 20 SQ CM/<: CPT | Mod: RT,78

## 2022-07-13 PROCEDURE — 99232 SBSQ HOSP IP/OBS MODERATE 35: CPT | Mod: FS

## 2022-07-13 DEVICE — BONE FILLER BIOCOMPOSITE STIMULAN RAPID CURE 10CC: Type: IMPLANTABLE DEVICE | Status: FUNCTIONAL

## 2022-07-13 RX ORDER — SENNA PLUS 8.6 MG/1
2 TABLET ORAL AT BEDTIME
Refills: 0 | Status: DISCONTINUED | OUTPATIENT
Start: 2022-07-13 | End: 2022-07-18

## 2022-07-13 RX ORDER — SODIUM CHLORIDE 9 MG/ML
1000 INJECTION, SOLUTION INTRAVENOUS
Refills: 0 | Status: DISCONTINUED | OUTPATIENT
Start: 2022-07-14 | End: 2022-07-18

## 2022-07-13 RX ORDER — SODIUM CHLORIDE 9 MG/ML
1000 INJECTION, SOLUTION INTRAVENOUS
Refills: 0 | Status: DISCONTINUED | OUTPATIENT
Start: 2022-07-13 | End: 2022-07-13

## 2022-07-13 RX ORDER — HYDROMORPHONE HYDROCHLORIDE 2 MG/ML
0.5 INJECTION INTRAMUSCULAR; INTRAVENOUS; SUBCUTANEOUS
Refills: 0 | Status: DISCONTINUED | OUTPATIENT
Start: 2022-07-13 | End: 2022-07-13

## 2022-07-13 RX ORDER — ACETAMINOPHEN 500 MG
1000 TABLET ORAL ONCE
Refills: 0 | Status: COMPLETED | OUTPATIENT
Start: 2022-07-13 | End: 2023-06-11

## 2022-07-13 RX ORDER — ACETAMINOPHEN 500 MG
1000 TABLET ORAL ONCE
Refills: 0 | Status: COMPLETED | OUTPATIENT
Start: 2022-07-13 | End: 2022-07-13

## 2022-07-13 RX ORDER — ACETAMINOPHEN 500 MG
1000 TABLET ORAL ONCE
Refills: 0 | Status: COMPLETED | OUTPATIENT
Start: 2022-07-13 | End: 2022-07-14

## 2022-07-13 RX ORDER — HYDROMORPHONE HYDROCHLORIDE 2 MG/ML
1 INJECTION INTRAMUSCULAR; INTRAVENOUS; SUBCUTANEOUS ONCE
Refills: 0 | Status: DISCONTINUED | OUTPATIENT
Start: 2022-07-13 | End: 2022-07-13

## 2022-07-13 RX ORDER — POLYETHYLENE GLYCOL 3350 17 G/17G
17 POWDER, FOR SOLUTION ORAL AT BEDTIME
Refills: 0 | Status: DISCONTINUED | OUTPATIENT
Start: 2022-07-13 | End: 2022-07-18

## 2022-07-13 RX ORDER — ONDANSETRON 8 MG/1
4 TABLET, FILM COATED ORAL ONCE
Refills: 0 | Status: DISCONTINUED | OUTPATIENT
Start: 2022-07-13 | End: 2022-07-13

## 2022-07-13 RX ORDER — MAGNESIUM HYDROXIDE 400 MG/1
30 TABLET, CHEWABLE ORAL DAILY
Refills: 0 | Status: DISCONTINUED | OUTPATIENT
Start: 2022-07-13 | End: 2022-07-18

## 2022-07-13 RX ORDER — KETOROLAC TROMETHAMINE 30 MG/ML
15 SYRINGE (ML) INJECTION ONCE
Refills: 0 | Status: DISCONTINUED | OUTPATIENT
Start: 2022-07-13 | End: 2022-07-18

## 2022-07-13 RX ADMIN — HYDROMORPHONE HYDROCHLORIDE 0.5 MILLIGRAM(S): 2 INJECTION INTRAMUSCULAR; INTRAVENOUS; SUBCUTANEOUS at 21:51

## 2022-07-13 RX ADMIN — GABAPENTIN 100 MILLIGRAM(S): 400 CAPSULE ORAL at 23:54

## 2022-07-13 RX ADMIN — Medication 75 MILLIGRAM(S): at 23:54

## 2022-07-13 RX ADMIN — Medication 1000 MILLIGRAM(S): at 14:16

## 2022-07-13 RX ADMIN — SODIUM CHLORIDE 100 MILLILITER(S): 9 INJECTION, SOLUTION INTRAVENOUS at 21:58

## 2022-07-13 RX ADMIN — OXYCODONE HYDROCHLORIDE 30 MILLIGRAM(S): 5 TABLET ORAL at 18:12

## 2022-07-13 RX ADMIN — PIPERACILLIN AND TAZOBACTAM 25 GRAM(S): 4; .5 INJECTION, POWDER, LYOPHILIZED, FOR SOLUTION INTRAVENOUS at 17:49

## 2022-07-13 RX ADMIN — OXYCODONE HYDROCHLORIDE 30 MILLIGRAM(S): 5 TABLET ORAL at 09:00

## 2022-07-13 RX ADMIN — PIPERACILLIN AND TAZOBACTAM 25 GRAM(S): 4; .5 INJECTION, POWDER, LYOPHILIZED, FOR SOLUTION INTRAVENOUS at 02:16

## 2022-07-13 RX ADMIN — Medication 1 TABLET(S): at 11:07

## 2022-07-13 RX ADMIN — DULOXETINE HYDROCHLORIDE 60 MILLIGRAM(S): 30 CAPSULE, DELAYED RELEASE ORAL at 23:53

## 2022-07-13 RX ADMIN — PANTOPRAZOLE SODIUM 40 MILLIGRAM(S): 20 TABLET, DELAYED RELEASE ORAL at 08:01

## 2022-07-13 RX ADMIN — HYDROMORPHONE HYDROCHLORIDE 0.5 MILLIGRAM(S): 2 INJECTION INTRAMUSCULAR; INTRAVENOUS; SUBCUTANEOUS at 21:49

## 2022-07-13 RX ADMIN — Medication 400 MILLIGRAM(S): at 14:01

## 2022-07-13 RX ADMIN — Medication 88 MICROGRAM(S): at 05:53

## 2022-07-13 RX ADMIN — Medication 166.67 MILLIGRAM(S): at 23:53

## 2022-07-13 RX ADMIN — SODIUM CHLORIDE 100 MILLILITER(S): 9 INJECTION, SOLUTION INTRAVENOUS at 13:16

## 2022-07-13 RX ADMIN — Medication 166.67 MILLIGRAM(S): at 00:02

## 2022-07-13 RX ADMIN — HYDROMORPHONE HYDROCHLORIDE 0.5 MILLIGRAM(S): 2 INJECTION INTRAMUSCULAR; INTRAVENOUS; SUBCUTANEOUS at 21:39

## 2022-07-13 RX ADMIN — OXYCODONE HYDROCHLORIDE 30 MILLIGRAM(S): 5 TABLET ORAL at 08:01

## 2022-07-13 RX ADMIN — OXYCODONE HYDROCHLORIDE 30 MILLIGRAM(S): 5 TABLET ORAL at 19:10

## 2022-07-13 RX ADMIN — HYDROMORPHONE HYDROCHLORIDE 1 MILLIGRAM(S): 2 INJECTION INTRAMUSCULAR; INTRAVENOUS; SUBCUTANEOUS at 23:58

## 2022-07-13 RX ADMIN — PIPERACILLIN AND TAZOBACTAM 25 GRAM(S): 4; .5 INJECTION, POWDER, LYOPHILIZED, FOR SOLUTION INTRAVENOUS at 11:07

## 2022-07-13 RX ADMIN — HYDROMORPHONE HYDROCHLORIDE 0.5 MILLIGRAM(S): 2 INJECTION INTRAMUSCULAR; INTRAVENOUS; SUBCUTANEOUS at 22:01

## 2022-07-13 RX ADMIN — HYDROMORPHONE HYDROCHLORIDE 0.5 MILLIGRAM(S): 2 INJECTION INTRAMUSCULAR; INTRAVENOUS; SUBCUTANEOUS at 22:06

## 2022-07-13 RX ADMIN — SODIUM CHLORIDE 100 MILLILITER(S): 9 INJECTION, SOLUTION INTRAVENOUS at 00:02

## 2022-07-13 RX ADMIN — Medication 166.67 MILLIGRAM(S): at 13:17

## 2022-07-13 NOTE — DISCHARGE NOTE PROVIDER - NSDCMRMEDTOKEN_GEN_ALL_CORE_FT
ascorbic acid 500 mg oral tablet: 1 tab(s) orally 2 times a day  baclofen 20 mg oral tablet: 1 tab(s) orally 2 times a day  celecoxib 200 mg oral capsule: 1 cap(s) orally every 12 hours  Cymbalta 60 mg oral delayed release capsule: 1 cap(s) orally once a day (at bedtime)  gabapentin 100 mg oral capsule: orally once a day  levothyroxine 88 mcg (0.088 mg) oral tablet: 1 tab(s) orally once a day  magnesium oxide 500 mg oral tablet: 1 tab(s) orally once a day  Movantik 25 mg oral tablet: 1 tab(s) orally once a day (in the morning), As Needed  Multiple Vitamins oral tablet: 1 tab(s) orally once a day  omeprazole 40 mg oral delayed release capsule: 1 cap(s) orally once a day, As Needed  oxyCODONE 30 mg oral tablet: 1 tab(s) orally every 4 hours, As needed, pain 4-10  Probiotic Formula oral capsule: 1 cap(s) orally once a day  rivaroxaban 10 mg oral tablet: 1 tab(s) orally once a day for 35 days post op to prevent blood clots  senna (sennosides) 8.6 mg oral tablet: 2 tab(s) orally once a day (at bedtime)  Singulair 10 mg oral tablet: 2  orally 2 times a day, As Needed  traZODone: 75 milligram(s) orally once a day (at bedtime)   ascorbic acid 500 mg oral tablet: 1 tab(s) orally 2 times a day  baclofen 20 mg oral tablet: 1 tab(s) orally 2 times a day  cefpodoxime 200 mg oral tablet: 1 tab(s) orally 2 times a day   Cymbalta 60 mg oral delayed release capsule: 1 cap(s) orally once a day (at bedtime)  gabapentin 100 mg oral capsule: orally once a day  levothyroxine 88 mcg (0.088 mg) oral tablet: 1 tab(s) orally once a day  magnesium oxide 500 mg oral tablet: 1 tab(s) orally once a day  Movantik 25 mg oral tablet: 1 tab(s) orally once a day (in the morning), As Needed  Multiple Vitamins oral tablet: 1 tab(s) orally once a day  omeprazole 40 mg oral delayed release capsule: 1 cap(s) orally once a day, As Needed  oxyCODONE 30 mg oral tablet: 1 tab(s) orally every 4 hours, As needed, pain 4-10  Probiotic Formula oral capsule: 1 cap(s) orally once a day  rivaroxaban 10 mg oral tablet: 1 tab(s) orally once a day for 35 days post op to prevent blood clots  senna (sennosides) 8.6 mg oral tablet: 2 tab(s) orally once a day (at bedtime)  Singulair 10 mg oral tablet: 2  orally 2 times a day, As Needed  traZODone: 75 milligram(s) orally once a day (at bedtime)

## 2022-07-13 NOTE — PROCEDURE NOTE - PROCEDURE FINDINGS AND DETAILS
consent obtained to indicate right hip aspiration and placement of thurston rai drain- after discussed with Dr. Vu from orthopedics and Dr. Mccarthy when noted purulent discharge just from palpating wound.   patient had 60 cc of purulent fluid sent for evaluation- cx, gram stain, chemistry and crystals.   drain sutured in place.   patient return to room.

## 2022-07-13 NOTE — PROGRESS NOTE ADULT - ASSESSMENT
62F with hip pain and acutely inflamed surgical site.   No localizing complaints/findings otherwise for source of fever  Appearance nonspecific but looks lymphangitic- Strep/seroma?  R/O PJI though ESR not impressive    7/13: s/p IR procedure in the morning, 60 ml of purulent fluid drained and the drain was placed, fluid was sent for studies. The pt is scheduled for I&D with liner exchange later today. Vancomycin IV and Zosyn IV continued.     Suggestions--  continue IV vancomycin  Vancomycin level prior 4th dose  Target vanco trough 15-20  Watch kidney function in the setting of zosyn use  follow drainage culture - pending   follow BCs - NGTD  Trend CBC, inflammatory markers  To OR for I&d and liner exchange today  most likely will be discharged on IV abx    discussed with the pt

## 2022-07-13 NOTE — PROGRESS NOTE ADULT - SUBJECTIVE AND OBJECTIVE BOX
Patient seen and examined at bedside this AM.  No acute complaints at this time. Pain well controlled. Denies chest pain, shortness of breath, nausea or vomiting. Denies f/c. Denies numbness/tingling LLE. Patient reports that hip is draining fluid and soaking through dressing.      LABS:                        9.3    16.83 )-----------( 237      ( 2022 05:52 )             28.6         141  |  107  |  26<H>  ----------------------------<  97  4.2   |  26  |  0.82    Ca    8.4<L>      2022 05:52    TPro  6.4  /  Alb  3.0<L>  /  TBili  0.4  /  DBili  x   /  AST  15  /  ALT  14  /  AlkPhos  72  07-11    PT/INR - ( 2022 05:52 )   PT: 13.8 sec;   INR: 1.16 ratio         PTT - ( 2022 05:52 )  PTT:42.3 sec  Urinalysis Basic - ( 2022 21:24 )    Color: Yellow / Appearance: Clear / S.010 / pH: x  Gluc: x / Ketone: Negative  / Bili: Negative / Urobili: Negative mg/dL   Blood: x / Protein: Negative mg/dL / Nitrite: Negative   Leuk Esterase: Negative / RBC: 0-2 /HPF / WBC Negative   Sq Epi: x / Non Sq Epi: Occasional / Bacteria: x        VITAL SIGNS:  T(C): 37.4 (22 @ 05:04), Max: 37.4 (22 @ 23:49)  HR: 83 (22 @ 05:04) (83 - 90)  BP: 98/63 (22 @ 05:04) (92/56 - 109/75)  RR: 18 (22 @ 05:04) (17 - 18)  SpO2: 98% (22 @ 05:04) (96% - 99%)    PE:    General: NAD, resting comfortably in bed  RLE:     Right hip:     Gauze and abd pad in place soaked with serous drainage  Expressible serous drainage, not purulent  Healing Surgical incision site  mild erythema proximally and distally  +mild-moderate swelling of thigh   no palpable effusion  no ecchymosis  warm, however entire body warm to touch  +TTP over GT  NTTP over the bony prominences of the knee/ankle/foot  painless active ROM of the knee/ankle/foot  Unable to SLR  pROM R Hip limited 2' pain  No pain with axial loading or log roll  Calves are soft and nontender  Compartments soft and compressible  +flexors/quads/hams/TA/EHL/FHL/GSC  SILT L2-S1  DP/PT pulses palpable  Extremity warm / well perfused          Assessment:  62y Female with r/o Right prosthetic hip joint infection, s/p aTHA  w/ Dr. Cortez  vs Post operative seroma       Plan:  NPO for possible IR aspiration of fluid collection today  Hold DVT/PE prophylaxis for now, for possible OR, pending aspiration results  ESR/CRP:    Given lab results and clinical picture, will proceed with obtaining IR aspiration to determine whether to proceed with I & D    In ED Pt TMax 103.2, , /73, RR 24 --> Vanc/Zosyn given per ED  Repeat vitals improved, afebrile since    Pt advised that surgical intervention might be needed for possible right periprosthetic hip infection, surgical procedure discussed in detail including all risks, benefits and alternatives  pt expresses understanding and consent for procedure    ID recs appreciated  - vanc, zosyn    FU BCx, UCx    Pain control prn  Ice application  DVT prophylaxis with SCDs for now  WBAT  Medical mgmt appreciated    Will discuss with Dr. Cortez and advise of any changes to plan

## 2022-07-13 NOTE — DISCHARGE NOTE PROVIDER - PROVIDER TOKENS
PROVIDER:[TOKEN:[[id number]]] PROVIDER:[TOKEN:[[id number]]],PROVIDER:[TOKEN:[[id number]]]

## 2022-07-13 NOTE — PROGRESS NOTE ADULT - SUBJECTIVE AND OBJECTIVE BOX
Postop Check    Patient tolerated the procedure well. Patient seen and examined at bedside.  No acute complaints at this time. Pain well controlled. Denies chest pain, shortness of breath, nausea or vomiting.     PE:  Vital Signs Last 24 Hrs  T(C): 36.7 (07-13-22 @ 21:13), Max: 37.7 (07-13-22 @ 10:05)  T(F): 98 (07-13-22 @ 21:13), Max: 99.9 (07-13-22 @ 10:05)  HR: 69 (07-13-22 @ 22:01) (64 - 90)  BP: 124/67 (07-13-22 @ 22:01) (98/63 - 135/81)  BP(mean): --  RR: 15 (07-13-22 @ 22:01) (15 - 24)  SpO2: 98% (07-13-22 @ 22:01) (96% - 100%)    General: NAD, resting comfortably in bed  RLE:   ROME Dressing intact  SCDs present bilaterally  Compartments soft and compressible  No calf tenderness bilaterally  +TA/EHL/FHL/GSC  SILT L3-S1  2+ DP/PT                          8.7    10.34 )-----------( 199      ( 13 Jul 2022 07:25 )             26.5     13 Jul 2022 07:25    139    |  105    |  13     ----------------------------<  99     3.7     |  30     |  0.78     Ca    8.4        13 Jul 2022 07:25      PT/INR - ( 13 Jul 2022 11:30 )   PT: 13.9 sec;   INR: 1.16 ratio         PTT - ( 13 Jul 2022 11:30 )  PTT:40.3 sec    A/P:  62y f s/p irrigation and debridement POD 0, stable    -PT/OT   -WBAT RLE  -Pain Control  -DVT ppx per primary team  -Continue abx per ID  -FU AM Labs  -Rest, ice the extremity as needed  -Incentive Spirometry  -Medical management appreciated Postop Check    Patient tolerated the procedure well. Patient seen and examined at bedside.  No acute complaints at this time. Pain well controlled. Denies chest pain, shortness of breath, nausea or vomiting.     PE:  Vital Signs Last 24 Hrs  T(C): 36.7 (07-13-22 @ 21:13), Max: 37.7 (07-13-22 @ 10:05)  T(F): 98 (07-13-22 @ 21:13), Max: 99.9 (07-13-22 @ 10:05)  HR: 69 (07-13-22 @ 22:01) (64 - 90)  BP: 124/67 (07-13-22 @ 22:01) (98/63 - 135/81)  BP(mean): --  RR: 15 (07-13-22 @ 22:01) (15 - 24)  SpO2: 98% (07-13-22 @ 22:01) (96% - 100%)    General: NAD, resting comfortably in bed  RLE:   ROME Dressing intact  SCDs present bilaterally  Compartments soft and compressible  No calf tenderness bilaterally  +TA/EHL/FHL/GSC  SILT L3-S1  + DP  C/o R Foot pain, likely from hana table boot, will re eval in AM                          8.7    10.34 )-----------( 199      ( 13 Jul 2022 07:25 )             26.5     13 Jul 2022 07:25    139    |  105    |  13     ----------------------------<  99     3.7     |  30     |  0.78     Ca    8.4        13 Jul 2022 07:25      PT/INR - ( 13 Jul 2022 11:30 )   PT: 13.9 sec;   INR: 1.16 ratio         PTT - ( 13 Jul 2022 11:30 )  PTT:40.3 sec    A/P:  62y f s/p superficial irrigation and debridement for superficial abscress above the fascial s/p R aTHA (6/29) POD 0    -PT/OT   -WBAT RLE  -Pain Control prn  -DVT ppx per primary team; Patient was on Xarelto after index procedure, OK to restart 7/14   -SCDs encouraged  -Continue abx per ID  -FU OR Cx   -FU AM Labs  -Rest, ice the extremity as needed  -Incentive Spirometry  -Encourage ambulation  -Medical management appreciated  -Dispo: Pending PT eval and medical course

## 2022-07-13 NOTE — DISCHARGE NOTE PROVIDER - CARE PROVIDER_API CALL
Rigoberto Cortez)  [facility]  [address] [place] [address]  Phone: [phone]  Fax: [phone]  Follow Up Time:    Rigobreto Cortez)  [facility]  [address] [place] [address]  Phone: [phone]  Fax: [phone]  Follow Up Time:     Jonathan Anglin)  Infectious Disease; Internal Medicine  [address] [place] [address]  Phone: [phone]  Fax: ()-  Follow Up Time:

## 2022-07-13 NOTE — DISCHARGE NOTE PROVIDER - HOSPITAL COURSE
Patient is a 62y old  Female who presents with a chief complaint of fever, r/o septic joint (18 Jul 2022 10:22)    HPI: Patient is a 62F with a PMH of hypothyroidism, Migraine s/p Lumbar puncture [id number], Cesar Esophagus, anxiety/depression, chronic low back pain, Subarachnoid hemorrhage s/p clipping 1989 who presents to the ED for hip pain.  Patient recently discharged from [facility] to [facility] after she had a R THR.  Patient states she was doing well with physical therapy until around four days ago.  Patient noted increased swelling and pain from the R hip.  Patient states that over the last four days she has difficulty ambulating and now has trouble standing on the joint.  Patient also reports fever earlier today, brought to the ED for evaluation.  Patient currently complains of hip pain and frontal headache.  States the headache has been constant for four days.  Radiates down to her eyes at times.  No other complaints.  Reports recent colposcopy.  Also received antibiotics for a UTI.  Febrile in ED to 103.2, labs show leukocytosis.  Admitted to med surg.  (12 Jul 2022 01:07)    She was seen and evaluated by Orthopedics and started on broad spectrum abx. (Vanco/Zosyn) She was taken to the OR on 7/13/2022 for OR washout.  Wound cultures in OR were positive for E. Coli. Cultures are pan-sensitive and patient will be d/c  on Vantin to complete 10 day treatment.     GENERAL: NAD, well-groomed, well-developed  HEAD:  Atraumatic, Normocephalic  EYES: EOMI, PERRLA, conjunctiva and sclera clear  ENMT: Moist mucous membranes  NECK: Supple, No JVD  NERVOUS SYSTEM:  Alert & Oriented X3, Motor Strength 5/5 B/L upper and lower extremities; DTRs 2+ intact and symmetric  CHEST/LUNG: Clear to auscultation bilaterally; No rales, rhonchi, wheezing, or rubs  HEART: Regular rate and rhythm; No murmurs, rubs, or gallops  ABDOMEN: Soft, Nontender, Nondistended; Bowel sounds present  EXTREMITIES:  2+ Peripheral Pulses, No clubbing, cyanosis, or edema  LABS:                        10.8   6.76  )-----------( 285      ( 18 Jul 2022 06:22 )             33.5   RECENT CULTURES: Culture Results: Rare Escherichia coli Organism Identification: Escherichia coli Organism: Escherichia coli Method Type: MARIA A   Culture - Other (07.13.22 @ 20:20) Specimen Source: Wound #3 RIGHT HIP SUPERFICIAL WOUND C/S Culture Results:   No growth   RADIOLOGY & ADDITIONAL TESTS:  < from: CT Abdomen and Pelvis w/ IV Cont (07.11.22 @ 22:21) >    IMPRESSION:  Status post right hip arthroplasty. Soft tissue stranding in the anterior   soft tissues with adjacent loculated fluid collection measuring up to 4.1   cm. No definite associated peripheral contrast enhancement. Findings are   suggestive of postoperative fluid/seroma, early abscess formation is less   likely.    < end of copied text >

## 2022-07-13 NOTE — DISCHARGE NOTE PROVIDER - NSDCCPTREATMENT_GEN_ALL_CORE_FT
PRINCIPAL PROCEDURE  Procedure: Irrigation and debridement of right hip  Findings and Treatment: superficial abscess above the fascia  1.	Pain Control  2.	Walking with full weight bearing as tolerated, with assistive devices (walker/Cane as Needed)  3.	DVT Prophylaxis per primary, please see med rec for details   4.	Follow up with Dr. Cortez as Outpatient in 14 Days after Discharge from the Hospital. Call Office For Appointment.    5.	Ice affected area as Needed  6.	Keep Dressing Clean and dry. Nahid dressing to be removed POD7 on 7/20  7. Staples to be removed POD14  8. Continue antibiotics per infectious disease         PRINCIPAL PROCEDURE  Procedure: Irrigation and debridement of right hip  Findings and Treatment: superficial abscess above the fascia  1.	Pain Control  2.	Walking with full weight bearing as tolerated, with assistive devices (walker/Cane as Needed)  3.	DVT Prophylaxis per primary, please see med rec for details   4.	Follow up with Dr. Cortez as Outpatient in 14 Days after Discharge from the Hospital. Call Office For Appointment.    5.	Ice affected area as Needed  6.	Keep Dressing Clean and dry. Nahid dressing to be removed POD7 on 7/20. You may shower with aquacel, but no soaks or hot tubs.  7. Staples to be removed POD14  8. Continue antibiotics per infectious disease

## 2022-07-13 NOTE — PROGRESS NOTE ADULT - ASSESSMENT
Patient is a 62F with a PMH of hypothyroidism, Migraine s/p Lumbar puncture [id number], Cesar Esophagus, anxiety/depression, chronic low back pain, Subarachnoid hemorrhage s/p clipping 1989 who presents to the ED for hip pain.  Patient recently discharged from [facility] to [facility] after she had a R THR.  Patient states she was doing well with physical therapy until around four days ago.  Patient noted increased swelling and pain from the R hip.  Patient also reports fever earlier today, brought to the ED for evaluation.  Patient currently complains of hip pain and frontal headache.  States the headache has been constant for four days.  Radiates down to her eyes at times.  No other complaints.  Reports recent colposcopy.  Also received antibiotics for a UTI.  Febrile in ED to 103.2, labs show leukocytosis.  admitted to med surg.     7/12/22 - Pt seen and examined at bedside. denies R hip pain or incisional discomfort at present. R anterior hip appears edematous, however no overt purulence or bloody discharge noted. scant ecchymosis noted R lateral thigh. CRP elevated at 16, ESR elevated at 25, WBC increased from 15K - 16K. trend cbc, temp curves. ID Dr. Anglin following, plan to restart vanco and rec drainage, cx to obtained. Will resume diet today as no plan for immediate OR, defer to ortho to make pt NPO once they plan on an intervention. IR procedure noted -- possible US guided drainage planned.        Problem/Plan - 1:  ·  Problem: Fever.   ·  Plan: Febrile in ED with leukocytosis.    UA negative, CXR without significant infiltrates  Significant swelling to the R hip - ortho team on board - possible OR for aspiration  Started on Zosyn and Vanco in the ED, will continue.  Follow blood cultures - de-escalate antibiotics as needed   ID consult in [place] - [place]   NPO as per surgical team.  SCDs for DVT prop  Patient currently medically optimized, OR aspiration would likely be beneficial - septic or otherwise.     Problem/Plan - 2:  ·  Problem: Septic arthritis.   ·  Plan: as per above.     Problem/Plan - 3:  ·  Problem: Chronic anxiety.   ·  Plan: cymbalta, trazodone.     Problem/Plan - 4:  ·  Problem: Hypothyroidism.   ·  Plan: synthroid. Patient is a 62F with a PMH of hypothyroidism, Migraine s/p Lumbar puncture [phone], Cesar Esophagus, anxiety/depression, chronic low back pain, Subarachnoid hemorrhage s/p clipping 1989 who presents to the ED for hip pain.  Patient recently discharged from [facility] to [facility] after she had a R THR.  Patient states she was doing well with physical therapy until around four days ago.  Patient noted increased swelling and pain from the R hip.  Patient also reports fever earlier today, brought to the ED for evaluation.  Patient currently complains of hip pain and frontal headache.  States the headache has been constant for four days.  Radiates down to her eyes at times.  No other complaints.  Reports recent colposcopy.  Also received antibiotics for a UTI.  Febrile in ED to 103.2, labs show leukocytosis.  admitted to med surg.     7/12/22 - Pt seen and examined at bedside. denies R hip pain or incisional discomfort at present. R anterior hip appears edematous, however no overt purulence or bloody discharge noted. scant ecchymosis noted R lateral thigh. CRP elevated at 16, ESR elevated at 25, WBC increased from 15K - 16K. trend cbc, temp curves. ID Dr. Anglin following, plan to restart vanco and rec drainage, cx to obtained. Will resume diet today as no plan for immediate OR, defer to ortho to make pt NPO once they plan on an intervention. IR procedure noted -- possible US guided drainage planned.     7/13/22 - drainage bag w/ blood and purulent fluid, WBC improved today on Vanco/Zosyn. s/p IR drainage today, follow cx data. Will need I&D in OR, plan for drainage tonight per ortho. NPO for procedure, resume AC postop.        Problem/Plan - 1:  ·  Problem: Fever.   ·  Plan: Febrile in ED with leukocytosis.    UA negative, CXR without significant infiltrates  Significant swelling to the R hip - ortho team on board - possible OR for aspiration  Started on Zosyn and Vanco in the ED, will continue.  Follow blood cultures - de-escalate antibiotics as needed   ID consult in [place] - [place]   NPO as per surgical team.  SCDs for DVT prop  Patient currently medically optimized, OR aspiration would likely be beneficial - septic or otherwise.     Problem/Plan - 2:  ·  Problem: Septic arthritis.   ·  Plan: as per above.     Problem/Plan - 3:  ·  Problem: Chronic anxiety.   ·  Plan: cymbalta, trazodone.     Problem/Plan - 4:  ·  Problem: Hypothyroidism.   ·  Plan: synthroid.

## 2022-07-13 NOTE — CONSULT NOTE ADULT - SUBJECTIVE AND OBJECTIVE BOX
Interventional Radiology  Pre-Procedure Note-PAMELIDA    This is a 62y Female with hx of  right hip THR with drainage and leukocytosis.Patient is transported to IR for a right hip aspiration,     Allergies: No Known Allergies    PMHX:   Arthritis     Arthritis of right hip     Bipolar disorder     Chronic radicular low back pain     COVID-19 fatigue, fever, never hospitalized    History of Castro's esophagus     Hypothyroidism     Migraines     Osteoarthritis right hip,     PAST SURGICAL HISTORY:  Right THR 6/2022  H/O colonoscopy 2018    H/O subarachnoid hemorrhage 1989: yasergil clip: no MRI's    History of hysteroscopy s/p uterine ablation    History of lumbar puncture 1989, 1990, 1991, 1992    S/P endoscopy 2018.     T(C): 37.4 (07-13-22 @ 05:04), Max: 37.4 (07-12-22 @ 23:49)  HR: 90 (07-13-22 @ 07:59) (83 - 90)  BP: 122/73 (07-13-22 @ 07:59) (92/56 - 122/73)  RR: 18 (07-13-22 @ 05:04) (17 - 18)  SpO2: 98% (07-13-22 @ 05:04) (96% - 99%)  Wt(kg): --BP      HR      Spo2        Temp    Medications: DULoxetine 60 milliGRAM(s) Oral daily  gabapentin 100 milliGRAM(s) Oral daily  lactated ringers. 1000 milliLiter(s) IV Continuous <Continuous>  levothyroxine 88 MICROGram(s) Oral daily  multivitamin 1 Tablet(s) Oral daily  oxyCODONE    IR 30 milliGRAM(s) Oral every 6 hours PRN  pantoprazole    Tablet 40 milliGRAM(s) Oral before breakfast  piperacillin/tazobactam IVPB.. 3.375 Gram(s) IV Intermittent every 8 hours  traZODone 75 milliGRAM(s) Oral at bedtime  vancomycin  IVPB 1250 milliGRAM(s) IV Intermittent every 12 hours      Labs:                        8.7    10.34 )-----------( 199      ( 13 Jul 2022 07:25 )             26.5       07-13    139  |  105  |  13  ----------------------------<  99  3.7   |  30  |  0.78    Ca    8.4<L>      13 Jul 2022 07:25    TPro  6.4  /  Alb  3.0<L>  /  TBili  0.4  /  DBili  x   /  AST  15  /  ALT  14  /  AlkPhos  72  07-11      PT/INR - ( 12 Jul 2022 05:52 )   PT: 13.8 sec;   INR: 1.16 ratio         PTT - ( 12 Jul 2022 05:52 )  PTT:42.3 sec    A/P: This is a 62y Female with right hip drainage and leukocytosis s/p Right THR 6/2022  Patient is transported to IR for a tube evaluation, exchange and or removal.  Procedure, goals, risks, benefits and alternatives were  discussed with patient.  All questions were answered.  Patient demonstrates understanding of all risks involved with this procedure and wish to continue.  Risks include but are not limited to infection, hemorrhage and or mild to moderate pain.   Patient signed appropriate consent and consent is in patient's chart.

## 2022-07-13 NOTE — PROGRESS NOTE ADULT - SUBJECTIVE AND OBJECTIVE BOX
PILAR STAFFORD  MRN-[id number]    Follow Up:  right hip prosthesis infection     Interval History: The pt was seen and examined earlier, no distress, states that she is upset about upcoming I&D of her right hip, scheduled later today. The pt is afebrile, on RA, WBC normalized, Cr ok. The pt is s/p IR procedure earlier.     PAST MEDICAL & SURGICAL HISTORY:  Bipolar disorder      Hypothyroidism      Chronic radicular low back pain      Arthritis of right hip      Migraines      History of Castro&#x27;s esophagus      Osteoarthritis  right hip,      Arthritis      COVID-19  fatigue, fever, never hospitalized      History of hysteroscopy  s/p uterine ablation      H/O subarachnoid hemorrhage  1989: yasergil clip: no MRI&#x27;s      History of lumbar puncture  , , ,       H/O colonoscopy  2018      S/P endoscopy  2018          ROS:    [ ] Unobtainable because:  [ x] All other systems negative    Constitutional: no fever, no chills  Head: no trauma  Eyes: no vision changes, no eye pain  ENT:  no sore throat, no rhinorrhea  Cardiovascular:  no chest pain, no palpitation  Respiratory:  no SOB, no cough  GI:  no abd pain, no vomiting, no diarrhea  urinary: no dysuria, no hematuria, no flank pain  musculoskeletal: right hip pain is controlled   skin:  no rash  neurology:  no headache, no seizure, no change in mental status  psych: no anxiety, no depression         Allergies  No Known Allergies        ANTIMICROBIALS:  piperacillin/tazobactam IVPB.. 3.375 every 8 hours  vancomycin  IVPB 1250 every 12 hours      OTHER MEDS:  DULoxetine 60 milliGRAM(s) Oral daily  gabapentin 100 milliGRAM(s) Oral daily  lactated ringers. 1000 milliLiter(s) IV Continuous <Continuous>  levothyroxine 88 MICROGram(s) Oral daily  multivitamin 1 Tablet(s) Oral daily  oxyCODONE    IR 30 milliGRAM(s) Oral every 6 hours PRN  pantoprazole    Tablet 40 milliGRAM(s) Oral before breakfast  traZODone 75 milliGRAM(s) Oral at bedtime      Vital Signs Last 24 Hrs  T(C): 37.2 (2022 12:34), Max: 37.7 (2022 10:05)  T(F): 98.9 (2022 12:34), Max: 99.9 (2022 10:05)  HR: 71 (2022 12:34) (71 - 90)  BP: 106/71 (2022 12:34) (92/56 - 122/73)  BP(mean): --  RR: 18 (2022 12:34) (17 - 18)  SpO2: 97% (2022 12:34) (96% - 98%)    Parameters below as of 2022 12:34  Patient On (Oxygen Delivery Method): room air        Physical Exam:  General: Nontoxic-appearing Female in no acute distress.  HEENT: AT/NC. Anicteric. Conjunctiva pink and moist. Oropharynx clear.  Neck: Not rigid. No sense of mass.  Nodes: None palpable.  Lungs: Clear bilaterally without rales, wheezing or rhonchi  Heart: Regular rate and rhythm. No Murmur. No rub. No gallop. No palpable thrill.  Abdomen: Bowel sounds present and normoactive. Soft. Nondistended. Nontender. No sense of mass. No organomegaly.  Back: No spinal tenderness. No costovertebral angle tenderness.   Extremities: No cyanosis or clubbing. R hip s/p IR drainage and drain placement, drain with pus with blood like drainage, the area is erythematous, warm to touch, with lymphangitic appearance  Skin: Warm. Dry. Good turgor. No rash. No vasculitic stigmata.  Psychiatric: Grossly appropriate affect and mood for situation.     WBC Count: 10.34 K/uL ( @ 07:25)  WBC Count: 16.83 K/uL ( @ 05:52)  WBC Count: 15.50 K/uL ( @ 21:00)                            8.7    10.34 )-----------( 199      ( 2022 07:25 )             26.5       07-13    139  |  105  |  13  ----------------------------<  99  3.7   |  30  |  0.78    Ca    8.4<L>      2022 07:25    TPro  6.4  /  Alb  3.0<L>  /  TBili  0.4  /  DBili  x   /  AST  15  /  ALT  14  /  AlkPhos  72  07-11      Urinalysis Basic - ( 2022 21:24 )    Color: Yellow / Appearance: Clear / S.010 / pH: x  Gluc: x / Ketone: Negative  / Bili: Negative / Urobili: Negative mg/dL   Blood: x / Protein: Negative mg/dL / Nitrite: Negative   Leuk Esterase: Negative / RBC: 0-2 /HPF / WBC Negative   Sq Epi: x / Non Sq Epi: Occasional / Bacteria: x        Creatinine Trend: 0.78<--, 0.82<--, 1.03<--, 0.90<--, 0.99<--, 1.09<--  Lactate, Blood: 1.3 mmol/L (22 @ 21:00)      MICROBIOLOGY:  Vancomycin Level, Trough: 16.9 ug/mL (22 @ 11:30)  v  Clean Catch Clean Catch (Midstream)  22   <10,000 CFU/mL Normal Urogenital Ana  --  --      .Blood Blood-Peripheral  22   No growth to date.  --  --      .Blood Blood-Peripheral  22   No growth to date.  --  --    C-Reactive Protein, Serum: 16 ()      SARS-CoV-2 Result: NotDetec (22 @ 21:00)    COVID-19 PCR: Sarika (2022 13:36)    RADIOLOGY:

## 2022-07-13 NOTE — PROGRESS NOTE ADULT - SUBJECTIVE AND OBJECTIVE BOX
Patient is a 62y old  Female who presents with a chief complaint of fever, r/o septic joint (2022 09:14)      INTERVAL HPI/OVERNIGHT EVENTS:    MEDICATIONS  (STANDING):  DULoxetine 60 milliGRAM(s) Oral daily  gabapentin 100 milliGRAM(s) Oral daily  lactated ringers. 1000 milliLiter(s) (100 mL/Hr) IV Continuous <Continuous>  levothyroxine 88 MICROGram(s) Oral daily  multivitamin 1 Tablet(s) Oral daily  pantoprazole    Tablet 40 milliGRAM(s) Oral before breakfast  piperacillin/tazobactam IVPB.. 3.375 Gram(s) IV Intermittent every 8 hours  traZODone 75 milliGRAM(s) Oral at bedtime  vancomycin  IVPB 1250 milliGRAM(s) IV Intermittent every 12 hours    MEDICATIONS  (PRN):  oxyCODONE    IR 30 milliGRAM(s) Oral every 6 hours PRN Moderate Pain (4 - 6)      Allergies    No Known Allergies    Intolerances        REVIEW OF SYSTEMS:  CONSTITUTIONAL: No fever or chills  HEENT:  No headache, no sore throat  RESPIRATORY: No cough, wheezing, or shortness of breath  CARDIOVASCULAR: No chest pain, palpitations, or leg swelling  GASTROINTESTINAL: No abd pain, nausea, vomiting, or diarrhea  GENITOURINARY: No dysuria, frequency, or hematuria  NEUROLOGICAL: no focal weakness or dizziness  MUSCULOSKELETAL: no myalgias     Vital Signs Last 24 Hrs  T(C): 37.2 (2022 12:34), Max: 37.7 (2022 10:05)  T(F): 98.9 (2022 12:34), Max: 99.9 (2022 10:05)  HR: 71 (2022 12:34) (71 - 90)  BP: 106/71 (2022 12:34) (92/56 - 122/73)  BP(mean): --  RR: 18 (2022 12:34) (17 - 18)  SpO2: 97% (2022 12:34) (96% - 98%)    Parameters below as of 2022 12:34  Patient On (Oxygen Delivery Method): room air        PHYSICAL EXAM:  GENERAL: NAD  HEENT:  NC/AT, EOMI, moist mucous membranes  CHEST/LUNG:  CTA b/l, no rales, wheezes, or rhonchi  HEART:  RRR, S1, S2  ABDOMEN:  BS+, soft, nontender, nondistended  EXTREMITIES: no edema, cyanosis, or calf tenderness  NERVOUS SYSTEM: AA&Ox3    LABS:                        8.7    10.34 )-----------( 199      ( 2022 07:25 )             26.5     CBC Full  -  ( 2022 07:25 )  WBC Count : 10.34 K/uL  Hemoglobin : 8.7 g/dL  Hematocrit : 26.5 %  Platelet Count - Automated : 199 K/uL  Mean Cell Volume : 94.3 fl  Mean Cell Hemoglobin : 31.0 pg  Mean Cell Hemoglobin Concentration : 32.8 g/dL  Auto Neutrophil # : 7.87 K/uL  Auto Lymphocyte # : 1.33 K/uL  Auto Monocyte # : 0.80 K/uL  Auto Eosinophil # : 0.24 K/uL  Auto Basophil # : 0.03 K/uL  Auto Neutrophil % : 76.1 %  Auto Lymphocyte % : 12.9 %  Auto Monocyte % : 7.7 %  Auto Eosinophil % : 2.3 %  Auto Basophil % : 0.3 %    2022 07:25    139    |  105    |  13     ----------------------------<  99     3.7     |  30     |  0.78     Ca    8.4        2022 07:25      PT/INR - ( 2022 11:30 )   PT: 13.9 sec;   INR: 1.16 ratio         PTT - ( 2022 11:30 )  PTT:40.3 sec  Urinalysis Basic - ( 2022 21:24 )    Color: Yellow / Appearance: Clear / S.010 / pH: x  Gluc: x / Ketone: Negative  / Bili: Negative / Urobili: Negative mg/dL   Blood: x / Protein: Negative mg/dL / Nitrite: Negative   Leuk Esterase: Negative / RBC: 0-2 /HPF / WBC Negative   Sq Epi: x / Non Sq Epi: Occasional / Bacteria: x      CAPILLARY BLOOD GLUCOSE            Culture - Urine (collected 22 @ 21:24)  Source: Clean Catch Clean Catch (Midstream)  Final Report (22 @ 10:41):    <10,000 CFU/mL Normal Urogenital Ana    Culture - Blood (collected 22 @ 21:10)  Source: .Blood Blood-Peripheral  Preliminary Report (22 @ 10:01):    No growth to date.    Culture - Blood (collected 22 @ 21:00)  Source: .Blood Blood-Peripheral  Preliminary Report (22 @ 10:01):    No growth to date.        RADIOLOGY & ADDITIONAL TESTS:    Personally reviewed.     Consultant(s) Notes Reviewed:  [x] YES  [ ] NO    Care Discussed with [x] Consultants  [x] Patient  [ ] Family  [ ]      [ ] Other; RN  DVT ppx   Patient is a 62y old  Female who presents with a chief complaint of fever, r/o septic joint (2022 09:14)      INTERVAL HPI/OVERNIGHT EVENTS: Pt seen and examined at bedside post-IR drain placement -- admits drainage into bag, improved edema in R upper thigh. denies fevers, chills.     MEDICATIONS  (STANDING):  DULoxetine 60 milliGRAM(s) Oral daily  gabapentin 100 milliGRAM(s) Oral daily  lactated ringers. 1000 milliLiter(s) (100 mL/Hr) IV Continuous <Continuous>  levothyroxine 88 MICROGram(s) Oral daily  multivitamin 1 Tablet(s) Oral daily  pantoprazole    Tablet 40 milliGRAM(s) Oral before breakfast  piperacillin/tazobactam IVPB.. 3.375 Gram(s) IV Intermittent every 8 hours  traZODone 75 milliGRAM(s) Oral at bedtime  vancomycin  IVPB 1250 milliGRAM(s) IV Intermittent every 12 hours    MEDICATIONS  (PRN):  oxyCODONE    IR 30 milliGRAM(s) Oral every 6 hours PRN Moderate Pain (4 - 6)      Allergies    No Known Allergies    Intolerances        REVIEW OF SYSTEMS:  CONSTITUTIONAL: No fever or chills  HEENT:  No headache, no sore throat  RESPIRATORY: No cough, wheezing, or shortness of breath  CARDIOVASCULAR: No chest pain, palpitations, or leg swelling  GASTROINTESTINAL: No abd pain, nausea, vomiting, or diarrhea  GENITOURINARY: No dysuria, frequency, or hematuria  NEUROLOGICAL: no focal weakness or dizziness  MUSCULOSKELETAL: see hpi    Vital Signs Last 24 Hrs  T(C): 37.2 (2022 12:34), Max: 37.7 (2022 10:05)  T(F): 98.9 (2022 12:34), Max: 99.9 (2022 10:05)  HR: 71 (2022 12:34) (71 - 90)  BP: 106/71 (2022 12:34) (92/56 - 122/73)  BP(mean): --  RR: 18 (2022 12:34) (17 - 18)  SpO2: 97% (2022 12:34) (96% - 98%)    Parameters below as of 2022 12:34  Patient On (Oxygen Delivery Method): room air        PHYSICAL EXAM:  GENERAL: F in NAD  HEENT:  NC/AT, EOMI, moist mucous membranes  CHEST/LUNG:  CTA b/l, no rales, wheezes, or rhonchi  HEART:  RRR, S1, S2  ABDOMEN:  BS+, soft, nontender, nondistended  EXTREMITIES: RLE upper thigh improved edema, purulent blood and fluid present in drainage bag. SILT prox/distally b/l LE.  NERVOUS SYSTEM: AA&Ox3    LABS:                        8.7    10.34 )-----------( 199      ( 2022 07:25 )             26.5     CBC Full  -  ( 2022 07:25 )  WBC Count : 10.34 K/uL  Hemoglobin : 8.7 g/dL  Hematocrit : 26.5 %  Platelet Count - Automated : 199 K/uL  Mean Cell Volume : 94.3 fl  Mean Cell Hemoglobin : 31.0 pg  Mean Cell Hemoglobin Concentration : 32.8 g/dL  Auto Neutrophil # : 7.87 K/uL  Auto Lymphocyte # : 1.33 K/uL  Auto Monocyte # : 0.80 K/uL  Auto Eosinophil # : 0.24 K/uL  Auto Basophil # : 0.03 K/uL  Auto Neutrophil % : 76.1 %  Auto Lymphocyte % : 12.9 %  Auto Monocyte % : 7.7 %  Auto Eosinophil % : 2.3 %  Auto Basophil % : 0.3 %    2022 07:25    139    |  105    |  13     ----------------------------<  99     3.7     |  30     |  0.78     Ca    8.4        2022 07:25      PT/INR - ( 2022 11:30 )   PT: 13.9 sec;   INR: 1.16 ratio         PTT - ( 2022 11:30 )  PTT:40.3 sec  Urinalysis Basic - ( 2022 21:24 )    Color: Yellow / Appearance: Clear / S.010 / pH: x  Gluc: x / Ketone: Negative  / Bili: Negative / Urobili: Negative mg/dL   Blood: x / Protein: Negative mg/dL / Nitrite: Negative   Leuk Esterase: Negative / RBC: 0-2 /HPF / WBC Negative   Sq Epi: x / Non Sq Epi: Occasional / Bacteria: x      CAPILLARY BLOOD GLUCOSE            Culture - Urine (collected 22 @ 21:24)  Source: Clean Catch Clean Catch (Midstream)  Final Report (22 @ 10:41):    <10,000 CFU/mL Normal Urogenital Ana    Culture - Blood (collected 22 @ 21:10)  Source: .Blood Blood-Peripheral  Preliminary Report (22 @ 10:01):    No growth to date.    Culture - Blood (collected 22 @ 21:00)  Source: .Blood Blood-Peripheral  Preliminary Report (22 @ 10:01):    No growth to date.        RADIOLOGY & ADDITIONAL TESTS:    Personally reviewed.     Consultant(s) Notes Reviewed:  [x] YES  [ ] NO    Care Discussed with [x] Consultants  [x] Patient  [ ] Family  [ ]      [ ] Other; RN  DVT ppx

## 2022-07-13 NOTE — PROGRESS NOTE ADULT - ATTENDING COMMENTS
Agree with resident not and plan, patient s/p IR drainage this morning, gross purulence noted. Will plan for I and D later today with head liner exchange, discussed risk of need for possible 2 stage revision if this does not work. Will require extended course of abx afterwards. Patient understands these risks, all questions answered.

## 2022-07-13 NOTE — DISCHARGE NOTE PROVIDER - NSDCCPCAREPLAN_GEN_ALL_CORE_FT
PRINCIPAL DISCHARGE DIAGNOSIS  Diagnosis: Surgical site infection  Assessment and Plan of Treatment:        PRINCIPAL DISCHARGE DIAGNOSIS  Diagnosis: Surgical site infection  Assessment and Plan of Treatment: Septic Arthritis with Sepsis on Admission  - you had a surgical washout on 7/13/2022  - your wound cultures were positve for E. Coli Bacteria  - you were treated with Vancomycin and Zosyn during your hospitalization and will be discharged home on Vantin 200 mg orally twice daily for 5 days  - you will need to follow up with Orthopedics as an outpatient  - you will also need to follow up with Infectious disease as an outpatient.      SECONDARY DISCHARGE DIAGNOSES  Diagnosis: Chronic anxiety  Assessment and Plan of Treatment: - continue home medications    Diagnosis: Hypothyroidism  Assessment and Plan of Treatment: resume home medications    Diagnosis: S/P total right hip arthroplasty  Assessment and Plan of Treatment: - you will need to take Xarelto until follow up with Orthopedics  - avoid taking NSAIDS while on Xarelto as this may increase your risk of bleeding.

## 2022-07-14 LAB
ALBUMIN SERPL ELPH-MCNC: 2.6 G/DL — LOW (ref 3.3–5)
ALP SERPL-CCNC: 74 U/L — SIGNIFICANT CHANGE UP (ref 40–120)
ALT FLD-CCNC: 11 U/L — LOW (ref 12–78)
ANION GAP SERPL CALC-SCNC: 6 MMOL/L — SIGNIFICANT CHANGE UP (ref 5–17)
AST SERPL-CCNC: 12 U/L — LOW (ref 15–37)
BASOPHILS # BLD AUTO: 0.02 K/UL — SIGNIFICANT CHANGE UP (ref 0–0.2)
BASOPHILS NFR BLD AUTO: 0.2 % — SIGNIFICANT CHANGE UP (ref 0–2)
BILIRUB SERPL-MCNC: 0.6 MG/DL — SIGNIFICANT CHANGE UP (ref 0.2–1.2)
BUN SERPL-MCNC: 7 MG/DL — SIGNIFICANT CHANGE UP (ref 7–23)
CALCIUM SERPL-MCNC: 8.3 MG/DL — LOW (ref 8.5–10.1)
CHLORIDE SERPL-SCNC: 108 MMOL/L — SIGNIFICANT CHANGE UP (ref 96–108)
CO2 SERPL-SCNC: 28 MMOL/L — SIGNIFICANT CHANGE UP (ref 22–31)
CREAT SERPL-MCNC: 0.87 MG/DL — SIGNIFICANT CHANGE UP (ref 0.5–1.3)
CRP SERPL-MCNC: 182 MG/L — HIGH
EGFR: 76 ML/MIN/1.73M2 — SIGNIFICANT CHANGE UP
EOSINOPHIL # BLD AUTO: 0.39 K/UL — SIGNIFICANT CHANGE UP (ref 0–0.5)
EOSINOPHIL NFR BLD AUTO: 4.1 % — SIGNIFICANT CHANGE UP (ref 0–6)
GLUCOSE SERPL-MCNC: 93 MG/DL — SIGNIFICANT CHANGE UP (ref 70–99)
HCT VFR BLD CALC: 28.6 % — LOW (ref 34.5–45)
HGB BLD-MCNC: 9.1 G/DL — LOW (ref 11.5–15.5)
IMM GRANULOCYTES NFR BLD AUTO: 0.5 % — SIGNIFICANT CHANGE UP (ref 0–1.5)
LYMPHOCYTES # BLD AUTO: 1.97 K/UL — SIGNIFICANT CHANGE UP (ref 1–3.3)
LYMPHOCYTES # BLD AUTO: 20.8 % — SIGNIFICANT CHANGE UP (ref 13–44)
MCHC RBC-ENTMCNC: 30.8 PG — SIGNIFICANT CHANGE UP (ref 27–34)
MCHC RBC-ENTMCNC: 31.8 G/DL — LOW (ref 32–36)
MCV RBC AUTO: 96.9 FL — SIGNIFICANT CHANGE UP (ref 80–100)
MONOCYTES # BLD AUTO: 0.64 K/UL — SIGNIFICANT CHANGE UP (ref 0–0.9)
MONOCYTES NFR BLD AUTO: 6.8 % — SIGNIFICANT CHANGE UP (ref 2–14)
NEUTROPHILS # BLD AUTO: 6.38 K/UL — SIGNIFICANT CHANGE UP (ref 1.8–7.4)
NEUTROPHILS NFR BLD AUTO: 67.6 % — SIGNIFICANT CHANGE UP (ref 43–77)
NRBC # BLD: 0 /100 WBCS — SIGNIFICANT CHANGE UP (ref 0–0)
PLATELET # BLD AUTO: 259 K/UL — SIGNIFICANT CHANGE UP (ref 150–400)
POTASSIUM SERPL-MCNC: 3.6 MMOL/L — SIGNIFICANT CHANGE UP (ref 3.5–5.3)
POTASSIUM SERPL-SCNC: 3.6 MMOL/L — SIGNIFICANT CHANGE UP (ref 3.5–5.3)
PROT SERPL-MCNC: 6.2 GM/DL — SIGNIFICANT CHANGE UP (ref 6–8.3)
RBC # BLD: 2.95 M/UL — LOW (ref 3.8–5.2)
RBC # FLD: 13.3 % — SIGNIFICANT CHANGE UP (ref 10.3–14.5)
SODIUM SERPL-SCNC: 142 MMOL/L — SIGNIFICANT CHANGE UP (ref 135–145)
VANCOMYCIN TROUGH SERPL-MCNC: 14.5 UG/ML — SIGNIFICANT CHANGE UP (ref 10–20)
VANCOMYCIN TROUGH SERPL-MCNC: 20.8 UG/ML — HIGH (ref 10–20)
WBC # BLD: 9.45 K/UL — SIGNIFICANT CHANGE UP (ref 3.8–10.5)
WBC # FLD AUTO: 9.45 K/UL — SIGNIFICANT CHANGE UP (ref 3.8–10.5)

## 2022-07-14 PROCEDURE — 99233 SBSQ HOSP IP/OBS HIGH 50: CPT

## 2022-07-14 PROCEDURE — 99232 SBSQ HOSP IP/OBS MODERATE 35: CPT | Mod: FS

## 2022-07-14 RX ORDER — PIPERACILLIN AND TAZOBACTAM 4; .5 G/20ML; G/20ML
3.38 INJECTION, POWDER, LYOPHILIZED, FOR SOLUTION INTRAVENOUS ONCE
Refills: 0 | Status: DISCONTINUED | OUTPATIENT
Start: 2022-07-14 | End: 2022-07-14

## 2022-07-14 RX ORDER — SODIUM CHLORIDE 9 MG/ML
500 INJECTION INTRAMUSCULAR; INTRAVENOUS; SUBCUTANEOUS ONCE
Refills: 0 | Status: COMPLETED | OUTPATIENT
Start: 2022-07-14 | End: 2022-07-14

## 2022-07-14 RX ORDER — RIVAROXABAN 15 MG-20MG
10 KIT ORAL DAILY
Refills: 0 | Status: DISCONTINUED | OUTPATIENT
Start: 2022-07-14 | End: 2022-07-18

## 2022-07-14 RX ORDER — PIPERACILLIN AND TAZOBACTAM 4; .5 G/20ML; G/20ML
3.38 INJECTION, POWDER, LYOPHILIZED, FOR SOLUTION INTRAVENOUS EVERY 8 HOURS
Refills: 0 | Status: DISCONTINUED | OUTPATIENT
Start: 2022-07-15 | End: 2022-07-15

## 2022-07-14 RX ORDER — ACETAMINOPHEN 500 MG
1000 TABLET ORAL ONCE
Refills: 0 | Status: COMPLETED | OUTPATIENT
Start: 2022-07-14 | End: 2022-07-14

## 2022-07-14 RX ORDER — CELECOXIB 200 MG/1
200 CAPSULE ORAL
Refills: 0 | Status: DISCONTINUED | OUTPATIENT
Start: 2022-07-14 | End: 2022-07-18

## 2022-07-14 RX ADMIN — Medication 166.67 MILLIGRAM(S): at 23:42

## 2022-07-14 RX ADMIN — Medication 1000 MILLIGRAM(S): at 02:15

## 2022-07-14 RX ADMIN — Medication 88 MICROGRAM(S): at 05:40

## 2022-07-14 RX ADMIN — OXYCODONE HYDROCHLORIDE 30 MILLIGRAM(S): 5 TABLET ORAL at 18:13

## 2022-07-14 RX ADMIN — CELECOXIB 200 MILLIGRAM(S): 200 CAPSULE ORAL at 21:12

## 2022-07-14 RX ADMIN — Medication 400 MILLIGRAM(S): at 14:47

## 2022-07-14 RX ADMIN — Medication 400 MILLIGRAM(S): at 01:53

## 2022-07-14 RX ADMIN — OXYCODONE HYDROCHLORIDE 30 MILLIGRAM(S): 5 TABLET ORAL at 19:01

## 2022-07-14 RX ADMIN — RIVAROXABAN 10 MILLIGRAM(S): KIT at 12:40

## 2022-07-14 RX ADMIN — PIPERACILLIN AND TAZOBACTAM 25 GRAM(S): 4; .5 INJECTION, POWDER, LYOPHILIZED, FOR SOLUTION INTRAVENOUS at 18:13

## 2022-07-14 RX ADMIN — SODIUM CHLORIDE 500 MILLILITER(S): 9 INJECTION INTRAMUSCULAR; INTRAVENOUS; SUBCUTANEOUS at 06:10

## 2022-07-14 RX ADMIN — PANTOPRAZOLE SODIUM 40 MILLIGRAM(S): 20 TABLET, DELAYED RELEASE ORAL at 08:07

## 2022-07-14 RX ADMIN — OXYCODONE HYDROCHLORIDE 30 MILLIGRAM(S): 5 TABLET ORAL at 06:10

## 2022-07-14 RX ADMIN — PIPERACILLIN AND TAZOBACTAM 25 GRAM(S): 4; .5 INJECTION, POWDER, LYOPHILIZED, FOR SOLUTION INTRAVENOUS at 01:53

## 2022-07-14 RX ADMIN — CELECOXIB 200 MILLIGRAM(S): 200 CAPSULE ORAL at 12:39

## 2022-07-14 RX ADMIN — DULOXETINE HYDROCHLORIDE 60 MILLIGRAM(S): 30 CAPSULE, DELAYED RELEASE ORAL at 21:07

## 2022-07-14 RX ADMIN — Medication 1000 MILLIGRAM(S): at 15:05

## 2022-07-14 RX ADMIN — SENNA PLUS 2 TABLET(S): 8.6 TABLET ORAL at 00:13

## 2022-07-14 RX ADMIN — PIPERACILLIN AND TAZOBACTAM 25 GRAM(S): 4; .5 INJECTION, POWDER, LYOPHILIZED, FOR SOLUTION INTRAVENOUS at 10:27

## 2022-07-14 RX ADMIN — Medication 75 MILLIGRAM(S): at 21:08

## 2022-07-14 RX ADMIN — SODIUM CHLORIDE 100 MILLILITER(S): 9 INJECTION, SOLUTION INTRAVENOUS at 08:07

## 2022-07-14 RX ADMIN — CELECOXIB 200 MILLIGRAM(S): 200 CAPSULE ORAL at 13:40

## 2022-07-14 RX ADMIN — SENNA PLUS 2 TABLET(S): 8.6 TABLET ORAL at 21:08

## 2022-07-14 RX ADMIN — GABAPENTIN 100 MILLIGRAM(S): 400 CAPSULE ORAL at 21:07

## 2022-07-14 RX ADMIN — Medication 1 TABLET(S): at 12:39

## 2022-07-14 RX ADMIN — CELECOXIB 200 MILLIGRAM(S): 200 CAPSULE ORAL at 22:12

## 2022-07-14 RX ADMIN — HYDROMORPHONE HYDROCHLORIDE 1 MILLIGRAM(S): 2 INJECTION INTRAMUSCULAR; INTRAVENOUS; SUBCUTANEOUS at 00:15

## 2022-07-14 NOTE — PHYSICAL THERAPY INITIAL EVALUATION ADULT - BED MOBILITY LIMITATIONS, REHAB EVAL
7 PAGE(S)  TO/FROM - [facility]- Ped Urology  WITH - Summary of Assessment and Plan  [x] Received []   Given []   Faxed []   Mailed  09/27/19 for provider []  Dr. Rodriguez [x]  Dr. Taylor []  Dr. Chao  []  Deanna Porter NP [] [facility] Provider     [x] PLACED IN PROVIDERS BASKET  [] PLACED IN [facility] BASKET  [] OTHER -        decreased ability to use legs for bridging/pushing

## 2022-07-14 NOTE — OCCUPATIONAL THERAPY INITIAL EVALUATION ADULT - ADDITIONAL COMMENTS
As per patient and previous evaluation - Pt lives with friend (Who is around post op to assist) in a private house with 5 steps to enter with bilateral handrails that are far apart. Once inside, the pt main bedroom and bathroom is on that floor. The pts bathroom has a tub/shower combination, fixed/retractable shower head, standard toilet seat and 1x grab bar.

## 2022-07-14 NOTE — PROGRESS NOTE ADULT - SUBJECTIVE AND OBJECTIVE BOX
Patient seen and examined at bedside. No acute complaints at this time. Pain well controlled. Denies new numbness or tingling in RLE. Denies fevers/chills, chest pain, shortness of breath, nausea or vomiting. Pt reports ambulating to bathroom without difficulty.    LABS:                        8.7    10.34 )-----------( 199      ( 13 Jul 2022 07:25 )             26.5     07-13    139  |  105  |  13  ----------------------------<  99  3.7   |  30  |  0.78    Ca    8.4<L>      13 Jul 2022 07:25      PT/INR - ( 13 Jul 2022 11:30 )   PT: 13.9 sec;   INR: 1.16 ratio         PTT - ( 13 Jul 2022 11:30 )  PTT:40.3 sec      Culture - Body Fluid with Gram Stain (collected 07-13-22 @ 09:55)  Source: .Body Fluid Right Hip Drainage  Gram Stain (07-13-22 @ 19:19):    Numerous polymorphonuclear leukocytes per low power field    No organisms seen      VITAL SIGNS:  T(C): 36.3 (07-14-22 @ 05:45), Max: 37.7 (07-13-22 @ 10:05)  HR: 81 (07-14-22 @ 05:45) (64 - 90)  BP: 102/67 (07-14-22 @ 05:45) (100/66 - 135/81)  RR: 17 (07-14-22 @ 05:45) (15 - 24)  SpO2: 96% (07-14-22 @ 05:45) (96% - 100%)        PE:  General: NAD, resting comfortably in bed  RLE:   ROME Dressing intact  SCDs present bilaterally  Compartments soft and compressible  No calf tenderness bilaterally  +TA/EHL/FHL/GSC  SILT L2-S1  + DP/PT pulses      A/P:  62F s/p superficial irrigation and debridement for superficial abscess above the fascia s/p R aTHA (6/29) POD 1    -PT/OT   -WBAT RLE  -Pain Control prn  -DVT ppx per primary team; Patient was on Xarelto after index procedure, OK to restart today, 7/14   -SCDs encouraged  -Continue abx per ID  -FU OR Cx   -FU AM Labs  -Rest, ice the extremity as needed  -Incentive Spirometry  -Encourage ambulation  -Medical management appreciated  -Dispo: Pending PT eval and medical course  Patient seen and examined at bedside this AM. No acute complaints at this time. Pain well controlled. Denies new numbness or tingling in RLE. Denies fevers/chills, chest pain, shortness of breath, nausea or vomiting. Pt reports ambulating to bathroom without difficulty. Reports pain much decreased.       VITAL SIGNS:  T(C): 36.3 (07-14-22 @ 05:45), Max: 37.7 (07-13-22 @ 10:05)  HR: 81 (07-14-22 @ 05:45) (64 - 90)  BP: 102/67 (07-14-22 @ 05:45) (100/66 - 135/81)  RR: 17 (07-14-22 @ 05:45) (15 - 24)  SpO2: 96% (07-14-22 @ 05:45) (96% - 100%)    LABS:                        8.7    10.34 )-----------( 199      ( 13 Jul 2022 07:25 )             26.5     07-13    139  |  105  |  13  ----------------------------<  99  3.7   |  30  |  0.78    Ca    8.4<L>      13 Jul 2022 07:25      PT/INR - ( 13 Jul 2022 11:30 )   PT: 13.9 sec;   INR: 1.16 ratio         PTT - ( 13 Jul 2022 11:30 )  PTT:40.3 sec      Culture - Body Fluid with Gram Stain (collected 07-13-22 @ 09:55)  Source: .Body Fluid Right Hip Drainage  Gram Stain (07-13-22 @ 19:19):    Numerous polymorphonuclear leukocytes per low power field    No organisms seen            PE:  General: NAD, resting comfortably in bed  RLE:   ROME Dressing intact  SCDs present bilaterally  Compartments soft and compressible  No calf tenderness bilaterally  +TA/EHL/FHL/GSC  SILT L2-S1  + DP pulses  Foot warm / well perfused    A/P:  62F s/p superficial irrigation and debridement for superficial abscess above the fascia s/p R aTHA (6/29) POD 1    -PT/OT   -WBAT RLE  -Pain Control prn  -DVT ppx per primary team; Patient was on Xarelto after index procedure, OK to restart today, 7/14   -SCDs encouraged  -Continue abx per ID  -FU OR Cx   -FU IR Superficial aspiration Cx; GS PMNs, no orgs  -FU AM Labs  -Rest, ice the extremity as needed  -Incentive Spirometry  -Encourage ambulation  -Medical management appreciated  -Dispo: Pending PT eval and medical course   -No further acute orthopaedic surgical intervention indicated

## 2022-07-14 NOTE — PHYSICAL THERAPY INITIAL EVALUATION ADULT - ADDITIONAL COMMENTS
As per EMR:  Pt lives with her friend (whom can provide assist upon D/C home) in a private home, 5 entry steps c B/L rails(far apart), all amenities on the 1st floor. Pt stated she has 1 step into kitchen without rails, wide and deep enough to fit a rolling walker. Pt has tub/shower combo with a fixed shower head, standard toilet seat height, & grab bar.

## 2022-07-14 NOTE — PHYSICAL THERAPY INITIAL EVALUATION ADULT - PERTINENT HX OF CURRENT PROBLEM, REHAB EVAL
Patient is a 62y old  Female who presents with a chief complaint of fever, r/o septic joint. Pt is s/p R MEGAN, anterior approach 6/29/22. Pt was sent from [facility].

## 2022-07-14 NOTE — PROGRESS NOTE ADULT - SUBJECTIVE AND OBJECTIVE BOX
Patient is a 62y old  Female who presents with a chief complaint of fever, r/o septic joint (14 Jul 2022 05:57)      INTERVAL HPI/OVERNIGHT EVENTS:    MEDICATIONS  (STANDING):  DULoxetine 60 milliGRAM(s) Oral daily  gabapentin 100 milliGRAM(s) Oral daily  lactated ringers. 1000 milliLiter(s) (100 mL/Hr) IV Continuous <Continuous>  levothyroxine 88 MICROGram(s) Oral daily  multivitamin 1 Tablet(s) Oral daily  pantoprazole    Tablet 40 milliGRAM(s) Oral before breakfast  piperacillin/tazobactam IVPB.. 3.375 Gram(s) IV Intermittent every 8 hours  polyethylene glycol 3350 17 Gram(s) Oral at bedtime  rivaroxaban 10 milliGRAM(s) Oral daily  senna 2 Tablet(s) Oral at bedtime  traZODone 75 milliGRAM(s) Oral at bedtime  vancomycin  IVPB 1250 milliGRAM(s) IV Intermittent every 12 hours    MEDICATIONS  (PRN):  ketorolac   Injectable 15 milliGRAM(s) IV Push once PRN Moderate Pain (4 - 6)  magnesium hydroxide Suspension 30 milliLiter(s) Oral daily PRN Constipation  oxyCODONE    IR 30 milliGRAM(s) Oral every 6 hours PRN Moderate Pain (4 - 6)      Allergies    No Known Allergies    Intolerances        REVIEW OF SYSTEMS:  CONSTITUTIONAL: No fever or chills  HEENT:  No headache, no sore throat  RESPIRATORY: No cough, wheezing, or shortness of breath  CARDIOVASCULAR: No chest pain, palpitations, or leg swelling  GASTROINTESTINAL: No abd pain, nausea, vomiting, or diarrhea  GENITOURINARY: No dysuria, frequency, or hematuria  NEUROLOGICAL: no focal weakness or dizziness  MUSCULOSKELETAL: no myalgias     Vital Signs Last 24 Hrs  T(C): 36.3 (14 Jul 2022 05:45), Max: 37.7 (13 Jul 2022 10:05)  T(F): 97.3 (14 Jul 2022 05:45), Max: 99.9 (13 Jul 2022 10:05)  HR: 81 (14 Jul 2022 05:45) (64 - 81)  BP: 102/67 (14 Jul 2022 05:45) (100/66 - 135/81)  BP(mean): --  RR: 17 (14 Jul 2022 05:45) (15 - 24)  SpO2: 96% (14 Jul 2022 05:45) (96% - 100%)    Parameters below as of 14 Jul 2022 05:45  Patient On (Oxygen Delivery Method): room air        PHYSICAL EXAM:  GENERAL: NAD  HEENT:  NC/AT, EOMI, moist mucous membranes  CHEST/LUNG:  CTA b/l, no rales, wheezes, or rhonchi  HEART:  RRR, S1, S2  ABDOMEN:  BS+, soft, nontender, nondistended  EXTREMITIES: no edema, cyanosis, or calf tenderness  NERVOUS SYSTEM: AA&Ox3    LABS:                        9.1    9.45  )-----------( 259      ( 14 Jul 2022 08:18 )             28.6     CBC Full  -  ( 14 Jul 2022 08:18 )  WBC Count : 9.45 K/uL  Hemoglobin : 9.1 g/dL  Hematocrit : 28.6 %  Platelet Count - Automated : 259 K/uL  Mean Cell Volume : 96.9 fl  Mean Cell Hemoglobin : 30.8 pg  Mean Cell Hemoglobin Concentration : 31.8 g/dL  Auto Neutrophil # : 6.38 K/uL  Auto Lymphocyte # : 1.97 K/uL  Auto Monocyte # : 0.64 K/uL  Auto Eosinophil # : 0.39 K/uL  Auto Basophil # : 0.02 K/uL  Auto Neutrophil % : 67.6 %  Auto Lymphocyte % : 20.8 %  Auto Monocyte % : 6.8 %  Auto Eosinophil % : 4.1 %  Auto Basophil % : 0.2 %    14 Jul 2022 08:18    142    |  108    |  7      ----------------------------<  93     3.6     |  28     |  0.87     Ca    8.3        14 Jul 2022 08:18    TPro  6.2    /  Alb  2.6    /  TBili  0.6    /  DBili  x      /  AST  12     /  ALT  11     /  AlkPhos  74     14 Jul 2022 08:18    PT/INR - ( 13 Jul 2022 11:30 )   PT: 13.9 sec;   INR: 1.16 ratio         PTT - ( 13 Jul 2022 11:30 )  PTT:40.3 sec    CAPILLARY BLOOD GLUCOSE            Culture - Body Fluid with Gram Stain (collected 07-13-22 @ 09:55)  Source: .Body Fluid Right Hip Drainage  Gram Stain (07-13-22 @ 19:19):    Numerous polymorphonuclear leukocytes per low power field    No organisms seen    Culture - Urine (collected 07-11-22 @ 21:24)  Source: Clean Catch Clean Catch (Midstream)  Final Report (07-13-22 @ 10:41):    <10,000 CFU/mL Normal Urogenital Ana    Culture - Blood (collected 07-11-22 @ 21:10)  Source: .Blood Blood-Peripheral  Preliminary Report (07-13-22 @ 10:01):    No growth to date.    Culture - Blood (collected 07-11-22 @ 21:00)  Source: .Blood Blood-Peripheral  Preliminary Report (07-13-22 @ 10:01):    No growth to date.        RADIOLOGY & ADDITIONAL TESTS:    Personally reviewed.     Consultant(s) Notes Reviewed:  [x] YES  [ ] NO    Care Discussed with [x] Consultants  [x] Patient  [ ] Family  [ ]      [ ] Other; RN  DVT ppx   Patient is a 62y old  Female who presents with a chief complaint of fever, r/o septic joint (14 Jul 2022 05:57)      INTERVAL HPI/OVERNIGHT EVENTS: Pt seen and examined at bedside. admits mild RLE discomfort at incision site.     MEDICATIONS  (STANDING):  DULoxetine 60 milliGRAM(s) Oral daily  gabapentin 100 milliGRAM(s) Oral daily  lactated ringers. 1000 milliLiter(s) (100 mL/Hr) IV Continuous <Continuous>  levothyroxine 88 MICROGram(s) Oral daily  multivitamin 1 Tablet(s) Oral daily  pantoprazole    Tablet 40 milliGRAM(s) Oral before breakfast  piperacillin/tazobactam IVPB.. 3.375 Gram(s) IV Intermittent every 8 hours  polyethylene glycol 3350 17 Gram(s) Oral at bedtime  rivaroxaban 10 milliGRAM(s) Oral daily  senna 2 Tablet(s) Oral at bedtime  traZODone 75 milliGRAM(s) Oral at bedtime  vancomycin  IVPB 1250 milliGRAM(s) IV Intermittent every 12 hours    MEDICATIONS  (PRN):  ketorolac   Injectable 15 milliGRAM(s) IV Push once PRN Moderate Pain (4 - 6)  magnesium hydroxide Suspension 30 milliLiter(s) Oral daily PRN Constipation  oxyCODONE    IR 30 milliGRAM(s) Oral every 6 hours PRN Moderate Pain (4 - 6)      Allergies    No Known Allergies    Intolerances        REVIEW OF SYSTEMS:  CONSTITUTIONAL: No fever or chills  HEENT:  No headache, no sore throat  RESPIRATORY: No cough, wheezing, or shortness of breath  CARDIOVASCULAR: No chest pain, palpitations, or leg swelling  GASTROINTESTINAL: No abd pain, nausea, vomiting, or diarrhea  GENITOURINARY: No dysuria, frequency, or hematuria  NEUROLOGICAL: no focal weakness or dizziness  MUSCULOSKELETAL: see hpi    Vital Signs Last 24 Hrs  T(C): 36.3 (14 Jul 2022 05:45), Max: 37.7 (13 Jul 2022 10:05)  T(F): 97.3 (14 Jul 2022 05:45), Max: 99.9 (13 Jul 2022 10:05)  HR: 81 (14 Jul 2022 05:45) (64 - 81)  BP: 102/67 (14 Jul 2022 05:45) (100/66 - 135/81)  BP(mean): --  RR: 17 (14 Jul 2022 05:45) (15 - 24)  SpO2: 96% (14 Jul 2022 05:45) (96% - 100%)    Parameters below as of 14 Jul 2022 05:45  Patient On (Oxygen Delivery Method): room air        PHYSICAL EXAM:  GENERAL: F in NAD  HEENT:  NC/AT, EOMI, moist mucous membranes  CHEST/LUNG:  CTA b/l, no rales, wheezes, or rhonchi  HEART:  RRR, S1, S2  ABDOMEN:  BS+, soft, nontender, nondistended  EXTREMITIES: RLE w/ bandage c/d/i, drain in place, no drainage noted  NERVOUS SYSTEM: AA&Ox3    LABS:                        9.1    9.45  )-----------( 259      ( 14 Jul 2022 08:18 )             28.6     CBC Full  -  ( 14 Jul 2022 08:18 )  WBC Count : 9.45 K/uL  Hemoglobin : 9.1 g/dL  Hematocrit : 28.6 %  Platelet Count - Automated : 259 K/uL  Mean Cell Volume : 96.9 fl  Mean Cell Hemoglobin : 30.8 pg  Mean Cell Hemoglobin Concentration : 31.8 g/dL  Auto Neutrophil # : 6.38 K/uL  Auto Lymphocyte # : 1.97 K/uL  Auto Monocyte # : 0.64 K/uL  Auto Eosinophil # : 0.39 K/uL  Auto Basophil # : 0.02 K/uL  Auto Neutrophil % : 67.6 %  Auto Lymphocyte % : 20.8 %  Auto Monocyte % : 6.8 %  Auto Eosinophil % : 4.1 %  Auto Basophil % : 0.2 %    14 Jul 2022 08:18    142    |  108    |  7      ----------------------------<  93     3.6     |  28     |  0.87     Ca    8.3        14 Jul 2022 08:18    TPro  6.2    /  Alb  2.6    /  TBili  0.6    /  DBili  x      /  AST  12     /  ALT  11     /  AlkPhos  74     14 Jul 2022 08:18    PT/INR - ( 13 Jul 2022 11:30 )   PT: 13.9 sec;   INR: 1.16 ratio         PTT - ( 13 Jul 2022 11:30 )  PTT:40.3 sec    CAPILLARY BLOOD GLUCOSE            Culture - Body Fluid with Gram Stain (collected 07-13-22 @ 09:55)  Source: .Body Fluid Right Hip Drainage  Gram Stain (07-13-22 @ 19:19):    Numerous polymorphonuclear leukocytes per low power field    No organisms seen    Culture - Urine (collected 07-11-22 @ 21:24)  Source: Clean Catch Clean Catch (Midstream)  Final Report (07-13-22 @ 10:41):    <10,000 CFU/mL Normal Urogenital Ana    Culture - Blood (collected 07-11-22 @ 21:10)  Source: .Blood Blood-Peripheral  Preliminary Report (07-13-22 @ 10:01):    No growth to date.    Culture - Blood (collected 07-11-22 @ 21:00)  Source: .Blood Blood-Peripheral  Preliminary Report (07-13-22 @ 10:01):    No growth to date.        RADIOLOGY & ADDITIONAL TESTS:    Personally reviewed.     Consultant(s) Notes Reviewed:  [x] YES  [ ] NO    Care Discussed with [x] Consultants  [x] Patient  [ ] Family  [ ]      [ ] Other; RN  DVT ppx

## 2022-07-14 NOTE — PROGRESS NOTE ADULT - ASSESSMENT
Patient is a 62F with a PMH of hypothyroidism, Migraine s/p Lumbar puncture [id number], Cesar Esophagus, anxiety/depression, chronic low back pain, Subarachnoid hemorrhage s/p clipping 1989 who presents to the ED for hip pain.  Patient recently discharged from [facility] to [facility] after she had a R THR.  Patient states she was doing well with physical therapy until around four days ago.  Patient noted increased swelling and pain from the R hip.  Patient also reports fever earlier today, brought to the ED for evaluation.  Patient currently complains of hip pain and frontal headache.  States the headache has been constant for four days.  Radiates down to her eyes at times.  No other complaints.  Reports recent colposcopy.  Also received antibiotics for a UTI.  Febrile in ED to 103.2, labs show leukocytosis.  admitted to med surg.     7/12/22 - Pt seen and examined at bedside. denies R hip pain or incisional discomfort at present. R anterior hip appears edematous, however no overt purulence or bloody discharge noted. scant ecchymosis noted R lateral thigh. CRP elevated at 16, ESR elevated at 25, WBC increased from 15K - 16K. trend cbc, temp curves. ID Dr. Anglin following, plan to restart vanco and rec drainage, cx to obtained. Will resume diet today as no plan for immediate OR, defer to ortho to make pt NPO once they plan on an intervention. IR procedure noted -- possible US guided drainage planned.     7/13/22 - drainage bag w/ blood and purulent fluid, WBC improved today on Vanco/Zosyn. s/p IR drainage today, follow cx data. Will need I&D in OR, plan for drainage tonight per ortho. NPO for procedure, resume AC postop.        Problem/Plan - 1:  ·  Problem: Fever.   ·  Plan: Febrile in ED with leukocytosis.    UA negative, CXR without significant infiltrates  Significant swelling to the R hip - ortho team on board - possible OR for aspiration  Started on Zosyn and Vanco in the ED, will continue.  Follow blood cultures - de-escalate antibiotics as needed   ID consult in [place] - [place]   NPO as per surgical team.  SCDs for DVT prop  Patient currently medically optimized, OR aspiration would likely be beneficial - septic or otherwise.     Problem/Plan - 2:  ·  Problem: Septic arthritis.   ·  Plan: as per above.     Problem/Plan - 3:  ·  Problem: Chronic anxiety.   ·  Plan: cymbalta, trazodone.     Problem/Plan - 4:  ·  Problem: Hypothyroidism.   ·  Plan: synthroid. Patient is a 62F with a PMH of hypothyroidism, Migraine s/p Lumbar puncture [id number], Cesar Esophagus, anxiety/depression, chronic low back pain, Subarachnoid hemorrhage s/p clipping 1989 who presents to the ED for hip pain.  Patient recently discharged from [facility] to [facility] after she had a R THR.  Patient states she was doing well with physical therapy until around four days ago.  Patient noted increased swelling and pain from the R hip.  Patient also reports fever earlier today, brought to the ED for evaluation.  Patient currently complains of hip pain and frontal headache.  States the headache has been constant for four days.  Radiates down to her eyes at times.  No other complaints.  Reports recent colposcopy.  Also received antibiotics for a UTI.  Febrile in ED to 103.2, labs show leukocytosis.  admitted to med surg.     7/12/22 - Pt seen and examined at bedside. denies R hip pain or incisional discomfort at present. R anterior hip appears edematous, however no overt purulence or bloody discharge noted. scant ecchymosis noted R lateral thigh. CRP elevated at 16, ESR elevated at 25, WBC increased from 15K - 16K. trend cbc, temp curves. ID Dr. Anglin following, plan to restart vanco and rec drainage, cx to obtained. Will resume diet today as no plan for immediate OR, defer to ortho to make pt NPO once they plan on an intervention. IR procedure noted -- possible US guided drainage planned.     7/13/22 - drainage bag w/ blood and purulent fluid, WBC improved today on Vanco/Zosyn. s/p IR drainage today, follow cx data. Will need I&D in OR, plan for drainage tonight per ortho. NPO for procedure, resume AC postop.     7/14/22 - POD#1 s/p I&D. added celebrex at pt request -- monitor H/H, caution with NSAIDs while on Xarelto, and with hx Castro's esophagus. OR cx prelim with E.coli, c/w Zosyn, Vanco, f/u final cx data and sensitivities.       Problem/Plan - 1:  ·  Problem: Fever.   ·  Plan: Febrile in ED with leukocytosis.    UA negative, CXR without significant infiltrates  Significant swelling to the R hip - ortho team on board - possible OR for aspiration  Started on Zosyn and Vanco in the ED, will continue.  Follow blood cultures - de-escalate antibiotics as needed   ID consult in Am - [place]   NPO as per surgical team.  SCDs for DVT prop  Patient currently medically optimized, OR aspiration would likely be beneficial - septic or otherwise.     Problem/Plan - 2:  ·  Problem: Septic arthritis.   ·  Plan: as per above.     Problem/Plan - 3:  ·  Problem: Chronic anxiety.   ·  Plan: cymbalta, trazodone.     Problem/Plan - 4:  ·  Problem: Hypothyroidism.   ·  Plan: synthroid.

## 2022-07-14 NOTE — PROGRESS NOTE ADULT - ASSESSMENT
62F with hip pain and acutely inflamed surgical site.   No localizing complaints/findings otherwise for source of fever  Appearance nonspecific but looks lymphangitic- Strep/seroma?  R/O PJI though ESR not impressive    7/13: s/p IR procedure in the morning, 60 ml of purulent fluid drained and the drain was placed, fluid was sent for studies. The pt is scheduled for I&D with liner exchange later today. Vancomycin IV and Zosyn IV continued.   7/14: s/p irrigation and debridement of abscess of right hip yesterday, surgical dressing is on c/d/i, surrounding area is erythematous, warm to touch, + swelling, soft and somewhat tender. The pt is afebrile, on RA, no WBC, Cr ok, LFTs ok, BCs and UC with no growth to date. Abscess culture is growing E. Coli - preliminary result, a few more collected cultures during the procedure are pending result, will continue with vancomycin and zosyn Iv. Vanco level was at 20.8 this morning, and vanco dose was held, will repeat vanco level later today, ordered.     Suggestions--  continue IV vancomycin  Vancomycin level today evening - ordered   Target vanco trough 15-20  Watch kidney function in the setting of zosyn use  follow drainage culture - prelim with E. Coli  follow surgical cultures - pending  follow BCs - NGTD  Trend CBC, inflammatory markers    discussed with the pt  discussed with covering ortho PA

## 2022-07-14 NOTE — OCCUPATIONAL THERAPY INITIAL EVALUATION ADULT - GENERAL OBSERVATIONS, REHAB EVAL
Pt was encountered supine in bed; NAD, AXOX4; pt c/o pain s/p right hip I&D POD 1. Pt reviewed anterior hip precautions & compensatory techniques for ADLs.

## 2022-07-14 NOTE — PROGRESS NOTE ADULT - SUBJECTIVE AND OBJECTIVE BOX
PILAR STAFFORD  MRN-[id number]    Follow Up:  hip infection     Interval History: the pt was seen and examined earlier, no distress, s/p irrigation and debridement of abscess of right hip yesterday. The pt is afebrile, on RA, no WBC, Cr and LFTs ok, pain is controlled.     PAST MEDICAL & SURGICAL HISTORY:  Bipolar disorder      Hypothyroidism      Chronic radicular low back pain      Arthritis of right hip      Migraines      History of Castro&#x27;s esophagus      Osteoarthritis  right hip,      Arthritis      COVID-19  fatigue, fever, never hospitalized      History of hysteroscopy  s/p uterine ablation      H/O subarachnoid hemorrhage  1989: yasergil clip: no MRI&#x27;s      History of lumbar puncture  1989, 1990, 1991, 1992      H/O colonoscopy  2018      S/P endoscopy  2018          ROS:    [ ] Unobtainable because:  [x ] All other systems negative    Constitutional: no fever, no chills  Head: no trauma  Eyes: no vision changes, no eye pain  ENT:  no sore throat, no rhinorrhea  Cardiovascular:  no chest pain, no palpitation  Respiratory:  no SOB, no cough  GI:  no abd pain, no vomiting, no diarrhea  urinary: no dysuria, no hematuria, no flank pain  musculoskeletal:  post op pain is controlled  skin:  right hip swelling and redness   neurology:  no headache, no seizure, no change in mental status  psych: no anxiety, no depression         Allergies  No Known Allergies        ANTIMICROBIALS:  piperacillin/tazobactam IVPB.. 3.375 every 8 hours  vancomycin  IVPB 1250 every 12 hours      OTHER MEDS:  celecoxib 200 milliGRAM(s) Oral two times a day PRN  DULoxetine 60 milliGRAM(s) Oral daily  gabapentin 100 milliGRAM(s) Oral daily  ketorolac   Injectable 15 milliGRAM(s) IV Push once PRN  lactated ringers. 1000 milliLiter(s) IV Continuous <Continuous>  levothyroxine 88 MICROGram(s) Oral daily  magnesium hydroxide Suspension 30 milliLiter(s) Oral daily PRN  multivitamin 1 Tablet(s) Oral daily  oxyCODONE    IR 30 milliGRAM(s) Oral every 6 hours PRN  pantoprazole    Tablet 40 milliGRAM(s) Oral before breakfast  polyethylene glycol 3350 17 Gram(s) Oral at bedtime  rivaroxaban 10 milliGRAM(s) Oral daily  senna 2 Tablet(s) Oral at bedtime  traZODone 75 milliGRAM(s) Oral at bedtime      Vital Signs Last 24 Hrs  T(C): 36.9 (14 Jul 2022 13:45), Max: 36.9 (14 Jul 2022 13:45)  T(F): 98.4 (14 Jul 2022 13:45), Max: 98.4 (14 Jul 2022 13:45)  HR: 76 (14 Jul 2022 13:45) (64 - 81)  BP: 97/64 (14 Jul 2022 13:45) (97/64 - 135/81)  BP(mean): --  RR: 18 (14 Jul 2022 13:45) (15 - 24)  SpO2: 100% (14 Jul 2022 13:45) (95% - 100%)    Parameters below as of 14 Jul 2022 13:45  Patient On (Oxygen Delivery Method): room air        Physical Exam:  General: Nontoxic-appearing Female in no acute distress.  HEENT: AT/NC. Anicteric. Conjunctiva pink and moist. Oropharynx clear.  Neck: Not rigid. No sense of mass.  Nodes: None palpable.  Lungs: Clear bilaterally without rales, wheezing or rhonchi  Heart: Regular rate and rhythm. No Murmur. No rub. No gallop. No palpable thrill.  Abdomen: Bowel sounds present and normoactive. Soft. Nondistended. Nontender. No sense of mass. No organomegaly.  Back: No spinal tenderness. No costovertebral angle tenderness.   Extremities: No cyanosis or clubbing. R hip s/p irrigation and debridement of right hip abscess, dressing c/d/i, the surrounding area is erythematous, warm to touch, somewhat tender, with lymphangitic appearance  Skin: Warm. Dry. Good turgor. No rash. No vasculitic stigmata.  Psychiatric: Grossly appropriate affect and mood for situation.     WBC Count: 9.45 K/uL (07-14 @ 08:18)  WBC Count: 10.34 K/uL (07-13 @ 07:25)  WBC Count: 16.83 K/uL (07-12 @ 05:52)  WBC Count: 15.50 K/uL (07-11 @ 21:00)                            9.1    9.45  )-----------( 259      ( 14 Jul 2022 08:18 )             28.6       07-14    142  |  108  |  7   ----------------------------<  93  3.6   |  28  |  0.87    Ca    8.3<L>      14 Jul 2022 08:18    TPro  6.2  /  Alb  2.6<L>  /  TBili  0.6  /  DBili  x   /  AST  12<L>  /  ALT  11<L>  /  AlkPhos  74  07-14          Creatinine Trend: 0.87<--, 0.78<--, 0.82<--, 1.03<--, 0.90<--, 0.99<--      MICROBIOLOGY:  Vancomycin Level, Trough: 20.8 ug/mL (07-14-22 @ 11:30)    v  .Body Fluid Right Hip Drainage  07-13-22   Few Escherichia coli Susceptibility to follow.  --    Numerous polymorphonuclear leukocytes per low power field  No organisms seen      Clean Catch Clean Catch (Midstream)  07-11-22   <10,000 CFU/mL Normal Urogenital Ana  --  --      .Blood Blood-Peripheral  07-11-22   No growth to date.  --  --      .Blood Blood-Peripheral  07-11-22   No growth to date.  --  --      C-Reactive Protein, Serum: 182 (07-14)  C-Reactive Protein, Serum: 16 (07-11)      SARS-CoV-2 Result: NotDetec (07-11-22 @ 21:00)    COVID-19 PCR: Sarika (25 Apr 2022 13:36)    RADIOLOGY:

## 2022-07-15 ENCOUNTER — APPOINTMENT (OUTPATIENT)
Dept: ORTHOPEDIC SURGERY | Facility: CLINIC | Age: 62
End: 2022-07-15

## 2022-07-15 LAB
-  AMIKACIN: SIGNIFICANT CHANGE UP
-  AMOXICILLIN/CLAVULANIC ACID: SIGNIFICANT CHANGE UP
-  AMPICILLIN/SULBACTAM: SIGNIFICANT CHANGE UP
-  AMPICILLIN: SIGNIFICANT CHANGE UP
-  AZTREONAM: SIGNIFICANT CHANGE UP
-  CEFAZOLIN: SIGNIFICANT CHANGE UP
-  CEFEPIME: SIGNIFICANT CHANGE UP
-  CEFOXITIN: SIGNIFICANT CHANGE UP
-  CEFTRIAXONE: SIGNIFICANT CHANGE UP
-  CIPROFLOXACIN: SIGNIFICANT CHANGE UP
-  ERTAPENEM: SIGNIFICANT CHANGE UP
-  GENTAMICIN: SIGNIFICANT CHANGE UP
-  IMIPENEM: SIGNIFICANT CHANGE UP
-  LEVOFLOXACIN: SIGNIFICANT CHANGE UP
-  MEROPENEM: SIGNIFICANT CHANGE UP
-  PIPERACILLIN/TAZOBACTAM: SIGNIFICANT CHANGE UP
-  TOBRAMYCIN: SIGNIFICANT CHANGE UP
-  TRIMETHOPRIM/SULFAMETHOXAZOLE: SIGNIFICANT CHANGE UP
ALBUMIN SERPL ELPH-MCNC: 2.4 G/DL — LOW (ref 3.3–5)
ALP SERPL-CCNC: 76 U/L — SIGNIFICANT CHANGE UP (ref 40–120)
ALT FLD-CCNC: 11 U/L — LOW (ref 12–78)
ANION GAP SERPL CALC-SCNC: 7 MMOL/L — SIGNIFICANT CHANGE UP (ref 5–17)
AST SERPL-CCNC: 9 U/L — LOW (ref 15–37)
BASOPHILS # BLD AUTO: 0.04 K/UL — SIGNIFICANT CHANGE UP (ref 0–0.2)
BASOPHILS NFR BLD AUTO: 0.8 % — SIGNIFICANT CHANGE UP (ref 0–2)
BILIRUB SERPL-MCNC: 0.3 MG/DL — SIGNIFICANT CHANGE UP (ref 0.2–1.2)
BUN SERPL-MCNC: 8 MG/DL — SIGNIFICANT CHANGE UP (ref 7–23)
CALCIUM SERPL-MCNC: 8.4 MG/DL — LOW (ref 8.5–10.1)
CHLORIDE SERPL-SCNC: 110 MMOL/L — HIGH (ref 96–108)
CO2 SERPL-SCNC: 27 MMOL/L — SIGNIFICANT CHANGE UP (ref 22–31)
CREAT SERPL-MCNC: 0.8 MG/DL — SIGNIFICANT CHANGE UP (ref 0.5–1.3)
EGFR: 84 ML/MIN/1.73M2 — SIGNIFICANT CHANGE UP
EOSINOPHIL # BLD AUTO: 0.32 K/UL — SIGNIFICANT CHANGE UP (ref 0–0.5)
EOSINOPHIL NFR BLD AUTO: 6.4 % — HIGH (ref 0–6)
GLUCOSE SERPL-MCNC: 95 MG/DL — SIGNIFICANT CHANGE UP (ref 70–99)
HCT VFR BLD CALC: 24.2 % — LOW (ref 34.5–45)
HGB BLD-MCNC: 7.7 G/DL — LOW (ref 11.5–15.5)
IMM GRANULOCYTES NFR BLD AUTO: 0.6 % — SIGNIFICANT CHANGE UP (ref 0–1.5)
LYMPHOCYTES # BLD AUTO: 1.17 K/UL — SIGNIFICANT CHANGE UP (ref 1–3.3)
LYMPHOCYTES # BLD AUTO: 23.3 % — SIGNIFICANT CHANGE UP (ref 13–44)
MCHC RBC-ENTMCNC: 30.2 PG — SIGNIFICANT CHANGE UP (ref 27–34)
MCHC RBC-ENTMCNC: 31.8 G/DL — LOW (ref 32–36)
MCV RBC AUTO: 94.9 FL — SIGNIFICANT CHANGE UP (ref 80–100)
METHOD TYPE: SIGNIFICANT CHANGE UP
MONOCYTES # BLD AUTO: 0.42 K/UL — SIGNIFICANT CHANGE UP (ref 0–0.9)
MONOCYTES NFR BLD AUTO: 8.4 % — SIGNIFICANT CHANGE UP (ref 2–14)
NEUTROPHILS # BLD AUTO: 3.04 K/UL — SIGNIFICANT CHANGE UP (ref 1.8–7.4)
NEUTROPHILS NFR BLD AUTO: 60.5 % — SIGNIFICANT CHANGE UP (ref 43–77)
NRBC # BLD: 0 /100 WBCS — SIGNIFICANT CHANGE UP (ref 0–0)
PLATELET # BLD AUTO: 207 K/UL — SIGNIFICANT CHANGE UP (ref 150–400)
POTASSIUM SERPL-MCNC: 3.9 MMOL/L — SIGNIFICANT CHANGE UP (ref 3.5–5.3)
POTASSIUM SERPL-SCNC: 3.9 MMOL/L — SIGNIFICANT CHANGE UP (ref 3.5–5.3)
PROT SERPL-MCNC: 5.4 GM/DL — LOW (ref 6–8.3)
RBC # BLD: 2.55 M/UL — LOW (ref 3.8–5.2)
RBC # FLD: 13.3 % — SIGNIFICANT CHANGE UP (ref 10.3–14.5)
SODIUM SERPL-SCNC: 144 MMOL/L — SIGNIFICANT CHANGE UP (ref 135–145)
WBC # BLD: 5.02 K/UL — SIGNIFICANT CHANGE UP (ref 3.8–10.5)
WBC # FLD AUTO: 5.02 K/UL — SIGNIFICANT CHANGE UP (ref 3.8–10.5)

## 2022-07-15 PROCEDURE — 99232 SBSQ HOSP IP/OBS MODERATE 35: CPT

## 2022-07-15 PROCEDURE — 99233 SBSQ HOSP IP/OBS HIGH 50: CPT | Mod: FS

## 2022-07-15 RX ORDER — CEFTRIAXONE 500 MG/1
2000 INJECTION, POWDER, FOR SOLUTION INTRAMUSCULAR; INTRAVENOUS EVERY 24 HOURS
Refills: 0 | Status: DISCONTINUED | OUTPATIENT
Start: 2022-07-15 | End: 2022-07-18

## 2022-07-15 RX ORDER — LANOLIN ALCOHOL/MO/W.PET/CERES
3 CREAM (GRAM) TOPICAL AT BEDTIME
Refills: 0 | Status: DISCONTINUED | OUTPATIENT
Start: 2022-07-15 | End: 2022-07-18

## 2022-07-15 RX ADMIN — CEFTRIAXONE 100 MILLIGRAM(S): 500 INJECTION, POWDER, FOR SOLUTION INTRAMUSCULAR; INTRAVENOUS at 12:02

## 2022-07-15 RX ADMIN — OXYCODONE HYDROCHLORIDE 30 MILLIGRAM(S): 5 TABLET ORAL at 22:16

## 2022-07-15 RX ADMIN — RIVAROXABAN 10 MILLIGRAM(S): KIT at 12:02

## 2022-07-15 RX ADMIN — PANTOPRAZOLE SODIUM 40 MILLIGRAM(S): 20 TABLET, DELAYED RELEASE ORAL at 08:08

## 2022-07-15 RX ADMIN — OXYCODONE HYDROCHLORIDE 30 MILLIGRAM(S): 5 TABLET ORAL at 13:13

## 2022-07-15 RX ADMIN — Medication 166.67 MILLIGRAM(S): at 23:22

## 2022-07-15 RX ADMIN — OXYCODONE HYDROCHLORIDE 30 MILLIGRAM(S): 5 TABLET ORAL at 06:05

## 2022-07-15 RX ADMIN — DULOXETINE HYDROCHLORIDE 60 MILLIGRAM(S): 30 CAPSULE, DELAYED RELEASE ORAL at 21:37

## 2022-07-15 RX ADMIN — SENNA PLUS 2 TABLET(S): 8.6 TABLET ORAL at 21:16

## 2022-07-15 RX ADMIN — GABAPENTIN 100 MILLIGRAM(S): 400 CAPSULE ORAL at 21:16

## 2022-07-15 RX ADMIN — Medication 88 MICROGRAM(S): at 05:06

## 2022-07-15 RX ADMIN — OXYCODONE HYDROCHLORIDE 30 MILLIGRAM(S): 5 TABLET ORAL at 21:16

## 2022-07-15 RX ADMIN — PIPERACILLIN AND TAZOBACTAM 25 GRAM(S): 4; .5 INJECTION, POWDER, LYOPHILIZED, FOR SOLUTION INTRAVENOUS at 02:01

## 2022-07-15 RX ADMIN — OXYCODONE HYDROCHLORIDE 30 MILLIGRAM(S): 5 TABLET ORAL at 05:05

## 2022-07-15 RX ADMIN — OXYCODONE HYDROCHLORIDE 30 MILLIGRAM(S): 5 TABLET ORAL at 12:13

## 2022-07-15 RX ADMIN — POLYETHYLENE GLYCOL 3350 17 GRAM(S): 17 POWDER, FOR SOLUTION ORAL at 21:17

## 2022-07-15 RX ADMIN — Medication 166.67 MILLIGRAM(S): at 15:05

## 2022-07-15 RX ADMIN — Medication 1 TABLET(S): at 12:02

## 2022-07-15 RX ADMIN — Medication 75 MILLIGRAM(S): at 21:16

## 2022-07-15 NOTE — PROGRESS NOTE ADULT - SUBJECTIVE AND OBJECTIVE BOX
Patient is a 62y old  Female who presents with a chief complaint of fever, r/o septic joint (15 Jul 2022 06:14)    HPI:  Patient is a 62F with a PMH of hypothyroidism, Migraine s/p Lumbar puncture [id number], Cesar Esophagus, anxiety/depression, chronic low back pain, Subarachnoid hemorrhage s/p clipping 1989 who presents to the ED for hip pain.  Patient recently discharged from [facility] to [facility] after she had a R THR.  Patient states she was doing well with physical therapy until around four days ago.  Patient noted increased swelling and pain from the R hip.  Patient states that over the last four days she has difficulty ambulating and now has trouble standing on the joint.  Patient also reports fever earlier today, brought to the ED for evaluation.  Patient currently complains of hip pain and frontal headache.  States the headache has been constant for four days.  Radiates down to her eyes at times.  No other complaints.  Reports recent colposcopy.  Also received antibiotics for a UTI.  Febrile in ED to 103.2, labs show leukocytosis.  Will admit to med surg.  (12 Jul 2022 01:07)    SUBJECTIVE & OBJECTIVE: Pt seen and examined at bedside.   PHYSICAL EXAM:  T(C): 36.8 (07-15-22 @ 05:00), Max: 36.9 (07-14-22 @ 13:45)  HR: 91 (07-15-22 @ 05:00) (66 - 91)  BP: 114/67 (07-15-22 @ 05:00) (97/64 - 114/67)  RR: 17 (07-15-22 @ 05:00) (16 - 18)  SpO2: 100% (07-15-22 @ 05:00) (95% - 100%) Daily     Daily I&O's Detail    14 Jul 2022 07:01  -  15 Jul 2022 07:00  --------------------------------------------------------  IN:    IV PiggyBack: 100 mL    IV PiggyBack: 250 mL    Lactated Ringers: 1200 mL  Total IN: 1550 mL    OUT:  Total OUT: 0 mL    Total NET: 1550 mL        GENERAL: NAD, well-groomed, well-developed  HEAD:  Atraumatic, Normocephalic  EYES: EOMI, PERRLA, conjunctiva and sclera clear  ENMT: Moist mucous membranes  NECK: Supple, No JVD  NERVOUS SYSTEM:  Alert & Oriented X3, Motor Strength 5/5 B/L upper and lower extremities; DTRs 2+ intact and symmetric  CHEST/LUNG: Clear to auscultation bilaterally; No rales, rhonchi, wheezing, or rubs  HEART: Regular rate and rhythm; No murmurs, rubs, or gallops  ABDOMEN: Soft, Nontender, Nondistended; Bowel sounds present  EXTREMITIES:  2+ Peripheral Pulses, No clubbing, cyanosis, or edema  LABS:                        7.7    5.02  )-----------( 207      ( 15 Jul 2022 07:25 )             24.2   PT/INR - ( 13 Jul 2022 11:30 )   PT: 13.9 sec;   INR: 1.16 ratio         PTT - ( 13 Jul 2022 11:30 )  PTT:40.3 secCAPILLARY BLOOD GLUCOSE        RECENT CULTURES:   RADIOLOGY & ADDITIONAL TESTS:

## 2022-07-15 NOTE — PROGRESS NOTE ADULT - SUBJECTIVE AND OBJECTIVE BOX
Patient seen and examined at bedside. Pain well controlled with medication. Patient denies any numbness, tingling, weakness, or any other orthopaedic complaint. Denies N/V/CP/SOB. OR cultures grew out E. coli yesterday.        VITAL SIGNS:  T(C): 36.8 (07-15-22 @ 05:00), Max: 36.9 (07-14-22 @ 13:45)  HR: 91 (07-15-22 @ 05:00) (66 - 91)  BP: 114/67 (07-15-22 @ 05:00) (97/64 - 114/67)  RR: 17 (07-15-22 @ 05:00) (16 - 18)  SpO2: 100% (07-15-22 @ 05:00) (95% - 100%)      LABS:                        9.1    9.45  )-----------( 259      ( 14 Jul 2022 08:18 )             28.6     07-14    142  |  108  |  7   ----------------------------<  93  3.6   |  28  |  0.87    Ca    8.3<L>      14 Jul 2022 08:18    TPro  6.2  /  Alb  2.6<L>  /  TBili  0.6  /  DBili  x   /  AST  12<L>  /  ALT  11<L>  /  AlkPhos  74  07-14    PT/INR - ( 13 Jul 2022 11:30 )   PT: 13.9 sec;   INR: 1.16 ratio         PTT - ( 13 Jul 2022 11:30 )  PTT:40.3 sec            PE:  General: NAD, resting comfortably in bed  RLE:   ROME Dressing intact  SCDs present bilaterally  Compartments soft and compressible  No calf tenderness bilaterally  +TA/EHL/FHL/GSC  SILT L2-S1  + DP pulses  Foot warm / well perfused    A/P:  62F s/p superficial irrigation and debridement for superficial abscess above the fascia s/p R aTHA (6/29) POD 2    -PT/OT   -WBAT RLE  -Pain Control prn  -DVT ppx per primary team; c/w Xarelto   -SCDs encouraged  -Continue abx per ID - currently recommending Vanc/Zosyn however now that E.coli growing, may narrow spectrum of abx  -FU OR Cx - E.coli currently growing    -FU IR Superficial aspiration Cx;   -FU AM Labs  -Rest, ice the extremity as needed  -Incentive Spirometry  -Encourage ambulation  -Medical management appreciated  -Dispo: [facility] per PT, patient requesting ORZAC.   -No further acute orthopaedic surgical intervention indicated

## 2022-07-15 NOTE — PROGRESS NOTE ADULT - SUBJECTIVE AND OBJECTIVE BOX
PILAR STAFFORD  MRN-[id number]    Follow Up:  right hip cellulitis with superficial abscess, s/p incision, drainage and irrigation     Interval History: The pt was seen and examined, no distress, states that she is feeling better overall. The pt remains afebrile, on RA, no WBC, Cr ok.     PAST MEDICAL & SURGICAL HISTORY:  Bipolar disorder      Hypothyroidism      Chronic radicular low back pain      Arthritis of right hip      Migraines      History of Castro&#x27;s esophagus      Osteoarthritis  right hip,      Arthritis      COVID-19  fatigue, fever, never hospitalized      History of hysteroscopy  s/p uterine ablation      H/O subarachnoid hemorrhage  1989: yasergil clip: no MRI&#x27;s      History of lumbar puncture  1989, 1990, 1991, 1992      H/O colonoscopy  2018      S/P endoscopy  2018          ROS:    [ ] Unobtainable because:  [ x] All other systems negative    Constitutional: no fever, no chills  Head: no trauma  Eyes: no vision changes, no eye pain  ENT:  no sore throat, no rhinorrhea  Cardiovascular:  no chest pain, no palpitation  Respiratory:  no SOB, no cough  GI:  no abd pain, no vomiting, no diarrhea  urinary: no dysuria, no hematuria, no flank pain  musculoskeletal:  no joint pain, area surrounding right hip with swelling but less redness   skin:  no rash  neurology:  no headache, no seizure, no change in mental status  psych: no anxiety, no depression         Allergies  No Known Allergies        ANTIMICROBIALS:  cefTRIAXone   IVPB 2000 every 24 hours  vancomycin  IVPB 1250 every 12 hours      OTHER MEDS:  celecoxib 200 milliGRAM(s) Oral two times a day PRN  DULoxetine 60 milliGRAM(s) Oral daily  gabapentin 100 milliGRAM(s) Oral daily  ketorolac   Injectable 15 milliGRAM(s) IV Push once PRN  lactated ringers. 1000 milliLiter(s) IV Continuous <Continuous>  levothyroxine 88 MICROGram(s) Oral daily  magnesium hydroxide Suspension 30 milliLiter(s) Oral daily PRN  multivitamin 1 Tablet(s) Oral daily  oxyCODONE    IR 30 milliGRAM(s) Oral every 6 hours PRN  pantoprazole    Tablet 40 milliGRAM(s) Oral before breakfast  polyethylene glycol 3350 17 Gram(s) Oral at bedtime  rivaroxaban 10 milliGRAM(s) Oral daily  senna 2 Tablet(s) Oral at bedtime  traZODone 75 milliGRAM(s) Oral at bedtime      Vital Signs Last 24 Hrs  T(C): 36.7 (15 Jul 2022 10:57), Max: 36.8 (14 Jul 2022 16:00)  T(F): 98 (15 Jul 2022 10:57), Max: 98.3 (14 Jul 2022 16:00)  HR: 78 (15 Jul 2022 10:57) (66 - 91)  BP: 117/77 (15 Jul 2022 10:57) (101/66 - 117/77)  BP(mean): --  RR: 19 (15 Jul 2022 10:57) (17 - 19)  SpO2: 98% (15 Jul 2022 10:57) (97% - 100%)    Parameters below as of 15 Jul 2022 10:57  Patient On (Oxygen Delivery Method): room air        Physical Exam:  General: Nontoxic-appearing Female in no acute distress.  HEENT: AT/NC. Anicteric. Conjunctiva pink and moist. Oropharynx clear.  Neck: Not rigid. No sense of mass.  Nodes: None palpable.  Lungs: Clear bilaterally without rales, wheezing or rhonchi  Heart: Regular rate and rhythm. No Murmur. No rub. No gallop. No palpable thrill.  Abdomen: Bowel sounds present and normoactive. Soft. Nondistended. Nontender. No sense of mass. No organomegaly.  Back: No spinal tenderness. No costovertebral angle tenderness.   Extremities: No cyanosis or clubbing. R hip s/p irrigation and debridement of right hip abscess, dressing c/d/i, the surrounding area is less erythematous, still warm to touch and with swelling, not tender  Skin: Warm. Dry. Good turgor. No rash. No vasculitic stigmata.  Psychiatric: Grossly appropriate affect and mood for situation.     WBC Count: 5.02 K/uL (07-15 @ 07:25)  WBC Count: 9.45 K/uL (07-14 @ 08:18)  WBC Count: 10.34 K/uL (07-13 @ 07:25)  WBC Count: 16.83 K/uL (07-12 @ 05:52)  WBC Count: 15.50 K/uL (07-11 @ 21:00)                            7.7    5.02  )-----------( 207      ( 15 Jul 2022 07:25 )             24.2       07-15    144  |  110<H>  |  8   ----------------------------<  95  3.9   |  27  |  0.80    Ca    8.4<L>      15 Jul 2022 07:25    TPro  5.4<L>  /  Alb  2.4<L>  /  TBili  0.3  /  DBili  x   /  AST  9<L>  /  ALT  11<L>  /  AlkPhos  76  07-15          Creatinine Trend: 0.80<--, 0.87<--, 0.78<--, 0.82<--, 1.03<--, 0.90<--      MICROBIOLOGY:  Vancomycin Level, Trough: 14.5 ug/mL (07-14-22 @ 22:25)    v  Wound #3 RIGHT HIP SUPERFICIAL WOUND C/S  07-13-22   No growth  --  --      .Other #2 RIGHT HIP SUPERFICIAL WOUND C/S  07-13-22   Rare Escherichia coli Susceptibility to follow.  --  --      Wound #1 RIGHT HIP SUPERFICIAL WOUND C/S  07-13-22   Few Escherichia coli Susceptibility to follow.  --  --      .Body Fluid Right Hip Drainage  07-13-22   Few Escherichia coli  --  Escherichia coli      Clean Catch Clean Catch (Midstream)  07-11-22   <10,000 CFU/mL Normal Urogenital Ana  --  --      .Blood Blood-Peripheral  07-11-22   No growth to date.  --  --      .Blood Blood-Peripheral  07-11-22   No growth to date.  --  --                C-Reactive Protein, Serum: 182 (07-14)  C-Reactive Protein, Serum: 16 (07-11)            SARS-CoV-2 Result: NotDetec (07-11-22 @ 21:00)    COVID-19 PCR: Haydentec (25 Apr 2022 13:36)    RADIOLOGY:

## 2022-07-15 NOTE — PROGRESS NOTE ADULT - ASSESSMENT
62F with hip pain and acutely inflamed surgical site.   No localizing complaints/findings otherwise for source of fever  Appearance nonspecific but looks lymphangitic- Strep/seroma?  R/O PJI though ESR not impressive    7/13: s/p IR procedure in the morning, 60 ml of purulent fluid drained and the drain was placed, fluid was sent for studies. The pt is scheduled for I&D with liner exchange later today. Vancomycin IV and Zosyn IV continued.   7/14: s/p irrigation and debridement of abscess of right hip yesterday, surgical dressing is on c/d/i, surrounding area is erythematous, warm to touch, + swelling, soft and somewhat tender. The pt is afebrile, on RA, no WBC, Cr ok, LFTs ok, BCs and UC with no growth to date. Abscess culture is growing E. Coli - preliminary result, a few more collected cultures during the procedure are pending result, will continue with vancomycin and zosyn Iv. Vanco level was at 20.8 this morning, and vanco dose was held, will repeat vanco level later today, ordered.   7/15: improving, remains afebrile, on RA, no leukocytosis, Cr ok, Vanco level was repeated yesterday evening 14.5. Abscess drainage and surgical cultures are growing E. Coli, Zosyn stopped, started IV ceftriaxone, will continue with vancomycin for now.     Suggestions--  stop Zosyn IV  start ceftriaxone 2 grams IV q 24 hrs  continue IV vancomycin  Monitoring Vancomycin levels is required   Target vanco trough 15-20  follow drainage culture - growing E. Coli  follow surgical cultures   follow BCs - NGTD  Trend CBC, inflammatory markers    discussed with the pt  Msg sent to Dr. De La Garza  Discussed with Dr. Anglin

## 2022-07-15 NOTE — PROGRESS NOTE ADULT - ASSESSMENT
63 y/o female with     7/12/22 - Pt seen and examined at bedside. denies R hip pain or incisional discomfort at present. R anterior hip appears edematous, however no overt purulence or bloody discharge noted. scant ecchymosis noted R lateral thigh. CRP elevated at 16, ESR elevated at 25, WBC increased from 15K - 16K. trend cbc, temp curves. ID Dr. Anglin following, plan to restart vanco and rec drainage, cx to obtained. Will resume diet today as no plan for immediate OR, defer to ortho to make pt NPO once they plan on an intervention. IR procedure noted -- possible US guided drainage planned.     7/13/22 - drainage bag w/ blood and purulent fluid, WBC improved today on Vanco/Zosyn. s/p IR drainage today, follow cx data. Will need I&D in OR, plan for drainage tonight per ortho. NPO for procedure, resume AC postop.     7/14/22 - POD#1 s/p I&D. added celebrex at pt request -- monitor H/H, caution with NSAIDs while on Xarelto, and with hx Castro's esophagus. OR cx prelim with E.coli, c/w Zosyn, Vanco, f/u final cx data and sensitivities.       Problem/Plan - 1:  ·  Problem: Acute blood Loss Anemia  ·  Plan: type and screen  - transfuse 2Uprbc     Problem/Plan - 2:  ·  Problem: Septic arthritis.   ·  Plan: s/p ID  - ID recc appreciated   - c.w abs and follow cultures     Problem/Plan - 3:  ·  Problem: Chronic anxiety.   ·  Plan: cymbalta, trazodone.     Problem/Plan - 4:  ·  Problem: Hypothyroidism.   ·  Plan: synthroid.

## 2022-07-15 NOTE — PROGRESS NOTE ADULT - NS ATTEND AMEND GEN_ALL_CORE FT
Attending Addendum--  Case reviewed with REGINE Myers. Her note reviewed and modified as appropriate.   Patient personally assessed and examined.  Seen earlier today.  Patient ambulating around her room without difficulty.  No hip pain.  Feeling much better.  Denies fevers chills or rigors.  Still has a swelling in her thigh, but inflammatory changes otherwise are markedly improved.  Reviewed with orthopedic resident, infection was superficial, with intact fascia.  No concern on their part for infected prosthetic hip.  Antibiotics narrowed to ceftriaxone  Given no apparent concern for prosthetic hip infection, would give fixed limited course of antibiotics, with transition to oral antibiotics once local inflammatory process appears controlled, and then manage expectantly thereafter.    Dr. Betancourt on call over the weekend. Please reach out to him via Teams for any issues that may arise.    Jonathan Anglin MD  Attending Physician  [facility]  Division of Infectious Diseases  [phone]

## 2022-07-16 DIAGNOSIS — T81.49XA INFECTION FOLLOWING A PROCEDURE, OTHER SURGICAL SITE, INITIAL ENCOUNTER: ICD-10-CM

## 2022-07-16 LAB
-  AMIKACIN: SIGNIFICANT CHANGE UP
-  AMIKACIN: SIGNIFICANT CHANGE UP
-  AMOXICILLIN/CLAVULANIC ACID: SIGNIFICANT CHANGE UP
-  AMOXICILLIN/CLAVULANIC ACID: SIGNIFICANT CHANGE UP
-  AMPICILLIN/SULBACTAM: SIGNIFICANT CHANGE UP
-  AMPICILLIN/SULBACTAM: SIGNIFICANT CHANGE UP
-  AMPICILLIN: SIGNIFICANT CHANGE UP
-  AMPICILLIN: SIGNIFICANT CHANGE UP
-  AZTREONAM: SIGNIFICANT CHANGE UP
-  AZTREONAM: SIGNIFICANT CHANGE UP
-  CEFAZOLIN: SIGNIFICANT CHANGE UP
-  CEFAZOLIN: SIGNIFICANT CHANGE UP
-  CEFEPIME: SIGNIFICANT CHANGE UP
-  CEFEPIME: SIGNIFICANT CHANGE UP
-  CEFOXITIN: SIGNIFICANT CHANGE UP
-  CEFOXITIN: SIGNIFICANT CHANGE UP
-  CEFTRIAXONE: SIGNIFICANT CHANGE UP
-  CEFTRIAXONE: SIGNIFICANT CHANGE UP
-  CIPROFLOXACIN: SIGNIFICANT CHANGE UP
-  CIPROFLOXACIN: SIGNIFICANT CHANGE UP
-  ERTAPENEM: SIGNIFICANT CHANGE UP
-  ERTAPENEM: SIGNIFICANT CHANGE UP
-  GENTAMICIN: SIGNIFICANT CHANGE UP
-  GENTAMICIN: SIGNIFICANT CHANGE UP
-  IMIPENEM: SIGNIFICANT CHANGE UP
-  IMIPENEM: SIGNIFICANT CHANGE UP
-  LEVOFLOXACIN: SIGNIFICANT CHANGE UP
-  LEVOFLOXACIN: SIGNIFICANT CHANGE UP
-  MEROPENEM: SIGNIFICANT CHANGE UP
-  MEROPENEM: SIGNIFICANT CHANGE UP
-  PIPERACILLIN/TAZOBACTAM: SIGNIFICANT CHANGE UP
-  PIPERACILLIN/TAZOBACTAM: SIGNIFICANT CHANGE UP
-  TOBRAMYCIN: SIGNIFICANT CHANGE UP
-  TOBRAMYCIN: SIGNIFICANT CHANGE UP
-  TRIMETHOPRIM/SULFAMETHOXAZOLE: SIGNIFICANT CHANGE UP
-  TRIMETHOPRIM/SULFAMETHOXAZOLE: SIGNIFICANT CHANGE UP
ANION GAP SERPL CALC-SCNC: 6 MMOL/L — SIGNIFICANT CHANGE UP (ref 5–17)
BUN SERPL-MCNC: 7 MG/DL — SIGNIFICANT CHANGE UP (ref 7–23)
CALCIUM SERPL-MCNC: 9.1 MG/DL — SIGNIFICANT CHANGE UP (ref 8.5–10.1)
CHLORIDE SERPL-SCNC: 106 MMOL/L — SIGNIFICANT CHANGE UP (ref 96–108)
CO2 SERPL-SCNC: 30 MMOL/L — SIGNIFICANT CHANGE UP (ref 22–31)
CREAT SERPL-MCNC: 0.82 MG/DL — SIGNIFICANT CHANGE UP (ref 0.5–1.3)
CULTURE RESULTS: NO GROWTH — SIGNIFICANT CHANGE UP
CULTURE RESULTS: SIGNIFICANT CHANGE UP
CULTURE RESULTS: SIGNIFICANT CHANGE UP
EGFR: 81 ML/MIN/1.73M2 — SIGNIFICANT CHANGE UP
GLUCOSE SERPL-MCNC: 94 MG/DL — SIGNIFICANT CHANGE UP (ref 70–99)
HCT VFR BLD CALC: 31.6 % — LOW (ref 34.5–45)
HGB BLD-MCNC: 10.2 G/DL — LOW (ref 11.5–15.5)
MCHC RBC-ENTMCNC: 29.4 PG — SIGNIFICANT CHANGE UP (ref 27–34)
MCHC RBC-ENTMCNC: 32.3 G/DL — SIGNIFICANT CHANGE UP (ref 32–36)
MCV RBC AUTO: 91.1 FL — SIGNIFICANT CHANGE UP (ref 80–100)
METHOD TYPE: SIGNIFICANT CHANGE UP
METHOD TYPE: SIGNIFICANT CHANGE UP
NRBC # BLD: 0 /100 WBCS — SIGNIFICANT CHANGE UP (ref 0–0)
ORGANISM # SPEC MICROSCOPIC CNT: SIGNIFICANT CHANGE UP
PLATELET # BLD AUTO: 266 K/UL — SIGNIFICANT CHANGE UP (ref 150–400)
POTASSIUM SERPL-MCNC: 4.2 MMOL/L — SIGNIFICANT CHANGE UP (ref 3.5–5.3)
POTASSIUM SERPL-SCNC: 4.2 MMOL/L — SIGNIFICANT CHANGE UP (ref 3.5–5.3)
RBC # BLD: 3.47 M/UL — LOW (ref 3.8–5.2)
RBC # FLD: 15.3 % — HIGH (ref 10.3–14.5)
SODIUM SERPL-SCNC: 142 MMOL/L — SIGNIFICANT CHANGE UP (ref 135–145)
SPECIMEN SOURCE: SIGNIFICANT CHANGE UP
VANCOMYCIN TROUGH SERPL-MCNC: 25 UG/ML — HIGH (ref 10–20)
WBC # BLD: 5.35 K/UL — SIGNIFICANT CHANGE UP (ref 3.8–10.5)
WBC # FLD AUTO: 5.35 K/UL — SIGNIFICANT CHANGE UP (ref 3.8–10.5)

## 2022-07-16 PROCEDURE — 99232 SBSQ HOSP IP/OBS MODERATE 35: CPT

## 2022-07-16 RX ORDER — ACETAMINOPHEN 500 MG
650 TABLET ORAL EVERY 6 HOURS
Refills: 0 | Status: DISCONTINUED | OUTPATIENT
Start: 2022-07-16 | End: 2022-07-16

## 2022-07-16 RX ORDER — ACETAMINOPHEN 500 MG
650 TABLET ORAL ONCE
Refills: 0 | Status: COMPLETED | OUTPATIENT
Start: 2022-07-16 | End: 2022-07-16

## 2022-07-16 RX ADMIN — OXYCODONE HYDROCHLORIDE 30 MILLIGRAM(S): 5 TABLET ORAL at 05:42

## 2022-07-16 RX ADMIN — Medication 650 MILLIGRAM(S): at 22:19

## 2022-07-16 RX ADMIN — CELECOXIB 200 MILLIGRAM(S): 200 CAPSULE ORAL at 22:19

## 2022-07-16 RX ADMIN — CEFTRIAXONE 100 MILLIGRAM(S): 500 INJECTION, POWDER, FOR SOLUTION INTRAMUSCULAR; INTRAVENOUS at 11:25

## 2022-07-16 RX ADMIN — GABAPENTIN 100 MILLIGRAM(S): 400 CAPSULE ORAL at 21:13

## 2022-07-16 RX ADMIN — PANTOPRAZOLE SODIUM 40 MILLIGRAM(S): 20 TABLET, DELAYED RELEASE ORAL at 07:48

## 2022-07-16 RX ADMIN — Medication 3 MILLIGRAM(S): at 21:12

## 2022-07-16 RX ADMIN — Medication 3 MILLIGRAM(S): at 00:09

## 2022-07-16 RX ADMIN — OXYCODONE HYDROCHLORIDE 30 MILLIGRAM(S): 5 TABLET ORAL at 14:34

## 2022-07-16 RX ADMIN — CELECOXIB 200 MILLIGRAM(S): 200 CAPSULE ORAL at 21:12

## 2022-07-16 RX ADMIN — Medication 75 MILLIGRAM(S): at 21:13

## 2022-07-16 RX ADMIN — Medication 650 MILLIGRAM(S): at 21:19

## 2022-07-16 RX ADMIN — SENNA PLUS 2 TABLET(S): 8.6 TABLET ORAL at 21:12

## 2022-07-16 RX ADMIN — DULOXETINE HYDROCHLORIDE 60 MILLIGRAM(S): 30 CAPSULE, DELAYED RELEASE ORAL at 21:13

## 2022-07-16 RX ADMIN — OXYCODONE HYDROCHLORIDE 30 MILLIGRAM(S): 5 TABLET ORAL at 04:42

## 2022-07-16 RX ADMIN — Medication 1 TABLET(S): at 11:24

## 2022-07-16 RX ADMIN — Medication 88 MICROGRAM(S): at 05:04

## 2022-07-16 RX ADMIN — RIVAROXABAN 10 MILLIGRAM(S): KIT at 11:24

## 2022-07-16 RX ADMIN — OXYCODONE HYDROCHLORIDE 30 MILLIGRAM(S): 5 TABLET ORAL at 13:34

## 2022-07-16 RX ADMIN — CELECOXIB 200 MILLIGRAM(S): 200 CAPSULE ORAL at 11:24

## 2022-07-16 NOTE — PROGRESS NOTE ADULT - SUBJECTIVE AND OBJECTIVE BOX
Patient is a 62y old  Female who presents with a chief complaint of fever, r/o septic joint (16 Jul 2022 07:13)    HPI:  Patient is a 62F with a PMH of hypothyroidism, Migraine s/p Lumbar puncture [id number], Cesar Esophagus, anxiety/depression, chronic low back pain, Subarachnoid hemorrhage s/p clipping 1989 who presents to the ED for hip pain.  Patient recently discharged from [facility] to [facility] after she had a R THR.  Patient states she was doing well with physical therapy until around four days ago.  Patient noted increased swelling and pain from the R hip.  Patient states that over the last four days she has difficulty ambulating and now has trouble standing on the joint.  Patient also reports fever earlier today, brought to the ED for evaluation.  Patient currently complains of hip pain and frontal headache.  States the headache has been constant for four days.  Radiates down to her eyes at times.  No other complaints.  Reports recent colposcopy.  Also received antibiotics for a UTI.  Febrile in ED to 103.2, labs show leukocytosis.  Will admit to med surg.  (12 Jul 2022 01:07)    SUBJECTIVE & OBJECTIVE: Pt seen and examined at bedside.   PHYSICAL EXAM:  T(C): 36.7 (07-16-22 @ 05:00), Max: 36.7 (07-15-22 @ 10:57)  HR: 72 (07-16-22 @ 05:00) (64 - 78)  BP: 122/80 (07-16-22 @ 05:00) (109/66 - 122/80)  RR: 18 (07-16-22 @ 05:00) (18 - 19)  SpO2: 98% (07-16-22 @ 05:00) (98% - 98%) Daily     Daily I&O's Detail    GENERAL: NAD, well-groomed, well-developed  HEAD:  Atraumatic, Normocephalic  EYES: EOMI, PERRLA, conjunctiva and sclera clear  ENMT: Moist mucous membranes  NECK: Supple, No JVD  NERVOUS SYSTEM:  Alert & Oriented X3, Motor Strength 5/5 B/L upper and lower extremities; DTRs 2+ intact and symmetric  CHEST/LUNG: Clear to auscultation bilaterally; No rales, rhonchi, wheezing, or rubs  HEART: Regular rate and rhythm; No murmurs, rubs, or gallops  ABDOMEN: Soft, Nontender, Nondistended; Bowel sounds present  EXTREMITIES:  right hip with dressing in place, no drainage  LABS:                        10.2   5.35  )-----------( 266      ( 16 Jul 2022 05:40 )             31.6   CAPILLARY BLOOD GLUCOSE        RECENT CULTURES:   RADIOLOGY & ADDITIONAL TESTS:

## 2022-07-16 NOTE — PROGRESS NOTE ADULT - ASSESSMENT
63 y/o female with     7/12/22 - Pt seen and examined at bedside. denies R hip pain or incisional discomfort at present. R anterior hip appears edematous, however no overt purulence or bloody discharge noted. scant ecchymosis noted R lateral thigh. CRP elevated at 16, ESR elevated at 25, WBC increased from 15K - 16K. trend cbc, temp curves. ID Dr. Anglin following, plan to restart vanco and rec drainage, cx to obtained. Will resume diet today as no plan for immediate OR, defer to ortho to make pt NPO once they plan on an intervention. IR procedure noted -- possible US guided drainage planned.     7/13/22 - drainage bag w/ blood and purulent fluid, WBC improved today on Vanco/Zosyn. s/p IR drainage today, follow cx data. Will need I&D in OR, plan for drainage tonight per ortho. NPO for procedure, resume AC postop.     7/14/22 - POD#1 s/p I&D. added celebrex at pt request -- monitor H/H, caution with NSAIDs while on Xarelto, and with hx Castro's esophagus. OR cx prelim with E.coli, c/w Zosyn, Vanco, f/u final cx data and sensitivities.    7/16/2022 - following surgical cultures for final abx recommendations. plan for [place] on d/c       Problem/Plan - 1:  ·  Problem: Acute blood Loss Anemia  ·  Plan: type and screen  - transfuse 2Uprbc     Problem/Plan - 2:  ·  Problem: Septic arthritis.   ·  Plan: s/p ID  - ID recc appreciated   - c.w abs and follow cultures     Problem/Plan - 3:  ·  Problem: Chronic anxiety.   ·  Plan: cymbalta, trazodone.     Problem/Plan - 4:  ·  Problem: Hypothyroidism.   ·  Plan: synthroid.

## 2022-07-16 NOTE — PROGRESS NOTE ADULT - SUBJECTIVE AND OBJECTIVE BOX
Patient seen and examined at bedside. Pain well controlled with medication. Patient denies any numbness, tingling, weakness, or any other orthopaedic complaint. Denies N/V/CP/SOB. OR cultures growing out E. coli.      VITAL SIGNS:  T(C): 36.8 (07-15-22 @ 05:00), Max: 36.9 (07-14-22 @ 13:45)  HR: 91 (07-15-22 @ 05:00) (66 - 91)  BP: 114/67 (07-15-22 @ 05:00) (97/64 - 114/67)  RR: 17 (07-15-22 @ 05:00) (16 - 18)  SpO2: 100% (07-15-22 @ 05:00) (95% - 100%)      LABS:                        9.1    9.45  )-----------( 259      ( 14 Jul 2022 08:18 )             28.6     07-14    142  |  108  |  7   ----------------------------<  93  3.6   |  28  |  0.87    Ca    8.3<L>      14 Jul 2022 08:18    TPro  6.2  /  Alb  2.6<L>  /  TBili  0.6  /  DBili  x   /  AST  12<L>  /  ALT  11<L>  /  AlkPhos  74  07-14    PT/INR - ( 13 Jul 2022 11:30 )   PT: 13.9 sec;   INR: 1.16 ratio         PTT - ( 13 Jul 2022 11:30 )  PTT:40.3 sec        PE:  General: NAD, resting comfortably in bed  RLE:   mild erythema around incision  increased swelling and induration around R hip  ROME Dressing intact  SCDs present bilaterally  Compartments soft and compressible  No calf tenderness bilaterally  +TA/EHL/FHL/GSC  SILT L2-S1  + DP pulses  Foot warm / well perfused    A/P:  62F s/p superficial irrigation and debridement for superficial abscess above the fascia s/p R aTHA (6/29) POD 3    -PT/OT   -WBAT RLE  -Pain Control prn  -DVT ppx per primary team; c/w Xarelto   -SCDs encouraged  -Continue abx per ID - currently Vanc/rocephin  -OR Cx: E.coli   -IR Superficial aspiration Cx: E coli susceptible to ceftriaxone  -FU AM Labs  -Rest, ice the extremity as needed  -Incentive Spirometry  -Encourage ambulation  -Medical management appreciated  -Dispo: [facility] per PT, patient requesting ORZAC.   -No further acute orthopaedic surgical intervention indicated Patient seen and examined at bedside. Pain well controlled with medication. Patient denies any numbness, tingling, weakness, or any other orthopaedic complaint. Denies N/V/CP/SOB. OR cultures growing out E. coli.      VITAL SIGNS:  T(C): 36.8 (07-15-22 @ 05:00), Max: 36.9 (07-14-22 @ 13:45)  HR: 91 (07-15-22 @ 05:00) (66 - 91)  BP: 114/67 (07-15-22 @ 05:00) (97/64 - 114/67)  RR: 17 (07-15-22 @ 05:00) (16 - 18)  SpO2: 100% (07-15-22 @ 05:00) (95% - 100%)      LABS:                        9.1    9.45  )-----------( 259      ( 14 Jul 2022 08:18 )             28.6     07-14    142  |  108  |  7   ----------------------------<  93  3.6   |  28  |  0.87    Ca    8.3<L>      14 Jul 2022 08:18    TPro  6.2  /  Alb  2.6<L>  /  TBili  0.6  /  DBili  x   /  AST  12<L>  /  ALT  11<L>  /  AlkPhos  74  07-14    PT/INR - ( 13 Jul 2022 11:30 )   PT: 13.9 sec;   INR: 1.16 ratio         PTT - ( 13 Jul 2022 11:30 )  PTT:40.3 sec        PE:  General: NAD, resting comfortably in bed  RLE:   mild erythema around incision  increased swelling and induration around R hip  ROME Dressing intact  SCDs present bilaterally  Compartments soft and compressible  No calf tenderness bilaterally  +TA/EHL/FHL/GSC  SILT L2-S1  + DP pulses  Foot warm / well perfused    A/P:  62F s/p superficial irrigation and debridement for superficial abscess above the fascia s/p R aTHA (6/29) POD 3    -Medical management appreciated  -PT/OT   -WBAT RLE  -Pain Control prn  -DVT ppx: Xarelto  -SCDs encouraged  -Continue abx per ID - currently Vanc/rocephin  -OR Cx: E.coli   -IR Superficial aspiration Cx: E coli susceptible to ceftriaxone  -FU AM Labs  -FU BCx: NGTD  -Rest, ice the extremity as needed  -Incentive Spirometry  -Encourage ambulation  -Dispo: MO[facility] per PT, patient requesting ORZAC.   -No further acute orthopaedic surgical intervention indicated  -Will discuss with attending Dr. Cortez and advise if any changes to plan

## 2022-07-17 LAB
ANION GAP SERPL CALC-SCNC: 6 MMOL/L — SIGNIFICANT CHANGE UP (ref 5–17)
BUN SERPL-MCNC: 17 MG/DL — SIGNIFICANT CHANGE UP (ref 7–23)
CALCIUM SERPL-MCNC: 9.2 MG/DL — SIGNIFICANT CHANGE UP (ref 8.5–10.1)
CHLORIDE SERPL-SCNC: 107 MMOL/L — SIGNIFICANT CHANGE UP (ref 96–108)
CO2 SERPL-SCNC: 29 MMOL/L — SIGNIFICANT CHANGE UP (ref 22–31)
CREAT SERPL-MCNC: 0.73 MG/DL — SIGNIFICANT CHANGE UP (ref 0.5–1.3)
CRP SERPL-MCNC: 44 MG/L — HIGH
CULTURE RESULTS: SIGNIFICANT CHANGE UP
CULTURE RESULTS: SIGNIFICANT CHANGE UP
EGFR: 94 ML/MIN/1.73M2 — SIGNIFICANT CHANGE UP
GLUCOSE SERPL-MCNC: 92 MG/DL — SIGNIFICANT CHANGE UP (ref 70–99)
HCT VFR BLD CALC: 34.3 % — LOW (ref 34.5–45)
HGB BLD-MCNC: 10.9 G/DL — LOW (ref 11.5–15.5)
MCHC RBC-ENTMCNC: 29.3 PG — SIGNIFICANT CHANGE UP (ref 27–34)
MCHC RBC-ENTMCNC: 31.8 G/DL — LOW (ref 32–36)
MCV RBC AUTO: 92.2 FL — SIGNIFICANT CHANGE UP (ref 80–100)
NRBC # BLD: 0 /100 WBCS — SIGNIFICANT CHANGE UP (ref 0–0)
PLATELET # BLD AUTO: 263 K/UL — SIGNIFICANT CHANGE UP (ref 150–400)
POTASSIUM SERPL-MCNC: 4.4 MMOL/L — SIGNIFICANT CHANGE UP (ref 3.5–5.3)
POTASSIUM SERPL-SCNC: 4.4 MMOL/L — SIGNIFICANT CHANGE UP (ref 3.5–5.3)
RBC # BLD: 3.72 M/UL — LOW (ref 3.8–5.2)
RBC # FLD: 15 % — HIGH (ref 10.3–14.5)
SODIUM SERPL-SCNC: 142 MMOL/L — SIGNIFICANT CHANGE UP (ref 135–145)
SPECIMEN SOURCE: SIGNIFICANT CHANGE UP
SPECIMEN SOURCE: SIGNIFICANT CHANGE UP
WBC # BLD: 5.73 K/UL — SIGNIFICANT CHANGE UP (ref 3.8–10.5)
WBC # FLD AUTO: 5.73 K/UL — SIGNIFICANT CHANGE UP (ref 3.8–10.5)

## 2022-07-17 PROCEDURE — 99232 SBSQ HOSP IP/OBS MODERATE 35: CPT

## 2022-07-17 RX ADMIN — DULOXETINE HYDROCHLORIDE 60 MILLIGRAM(S): 30 CAPSULE, DELAYED RELEASE ORAL at 21:06

## 2022-07-17 RX ADMIN — SENNA PLUS 2 TABLET(S): 8.6 TABLET ORAL at 21:05

## 2022-07-17 RX ADMIN — RIVAROXABAN 10 MILLIGRAM(S): KIT at 11:13

## 2022-07-17 RX ADMIN — Medication 3 MILLIGRAM(S): at 21:05

## 2022-07-17 RX ADMIN — Medication 88 MICROGRAM(S): at 05:47

## 2022-07-17 RX ADMIN — OXYCODONE HYDROCHLORIDE 30 MILLIGRAM(S): 5 TABLET ORAL at 11:13

## 2022-07-17 RX ADMIN — PANTOPRAZOLE SODIUM 40 MILLIGRAM(S): 20 TABLET, DELAYED RELEASE ORAL at 05:46

## 2022-07-17 RX ADMIN — CEFTRIAXONE 100 MILLIGRAM(S): 500 INJECTION, POWDER, FOR SOLUTION INTRAMUSCULAR; INTRAVENOUS at 12:40

## 2022-07-17 RX ADMIN — CELECOXIB 200 MILLIGRAM(S): 200 CAPSULE ORAL at 05:46

## 2022-07-17 RX ADMIN — OXYCODONE HYDROCHLORIDE 30 MILLIGRAM(S): 5 TABLET ORAL at 10:53

## 2022-07-17 RX ADMIN — CELECOXIB 200 MILLIGRAM(S): 200 CAPSULE ORAL at 21:05

## 2022-07-17 RX ADMIN — CELECOXIB 200 MILLIGRAM(S): 200 CAPSULE ORAL at 06:16

## 2022-07-17 RX ADMIN — Medication 75 MILLIGRAM(S): at 21:06

## 2022-07-17 RX ADMIN — Medication 1 TABLET(S): at 11:13

## 2022-07-17 RX ADMIN — GABAPENTIN 100 MILLIGRAM(S): 400 CAPSULE ORAL at 21:05

## 2022-07-17 RX ADMIN — CELECOXIB 200 MILLIGRAM(S): 200 CAPSULE ORAL at 21:35

## 2022-07-17 NOTE — PROGRESS NOTE ADULT - SUBJECTIVE AND OBJECTIVE BOX
Patient is a 62y old  Female who presents with a chief complaint of fever, r/o septic joint (17 Jul 2022 08:05)    HPI:  Patient is a 62F with a PMH of hypothyroidism, Migraine s/p Lumbar puncture [id number], Cesar Esophagus, anxiety/depression, chronic low back pain, Subarachnoid hemorrhage s/p clipping 1989 who presents to the ED for hip pain.  Patient recently discharged from [facility] to [facility] after she had a R THR.  Patient states she was doing well with physical therapy until around four days ago.  Patient noted increased swelling and pain from the R hip.  Patient states that over the last four days she has difficulty ambulating and now has trouble standing on the joint.  Patient also reports fever earlier today, brought to the ED for evaluation.  Patient currently complains of hip pain and frontal headache.  States the headache has been constant for four days.  Radiates down to her eyes at times.  No other complaints.  Reports recent colposcopy.  Also received antibiotics for a UTI.  Febrile in ED to 103.2, labs show leukocytosis.  Will admit to med surg.  (12 Jul 2022 01:07)    SUBJECTIVE & OBJECTIVE: Pt seen and examined at bedside.   PHYSICAL EXAM:  T(C): 37.1 (07-17-22 @ 16:47), Max: 37.1 (07-17-22 @ 16:47)  HR: 80 (07-17-22 @ 16:47) (65 - 80)  BP: 112/74 (07-17-22 @ 16:47) (102/57 - 127/84)  RR: 18 (07-17-22 @ 16:47) (16 - 18)  SpO2: 96% (07-17-22 @ 16:47) (96% - 99%) Daily     Daily I&O's Detail    17 Jul 2022 07:01  -  17 Jul 2022 21:39  --------------------------------------------------------  IN:    Oral Fluid: 600 mL  Total IN: 600 mL    OUT:  Total OUT: 0 mL    Total NET: 600 mL        GENERAL: NAD, well-groomed, well-developed  HEAD:  Atraumatic, Normocephalic  EYES: EOMI, PERRLA, conjunctiva and sclera clear  ENMT: Moist mucous membranes  NECK: Supple, No JVD  NERVOUS SYSTEM:  Alert & Oriented X3, Motor Strength 5/5 B/L upper and lower extremities; DTRs 2+ intact and symmetric  CHEST/LUNG: Clear to auscultation bilaterally; No rales, rhonchi, wheezing, or rubs  HEART: Regular rate and rhythm; No murmurs, rubs, or gallops  ABDOMEN: Soft, Nontender, Nondistended; Bowel sounds present  EXTREMITIES:  surgical dressing in place, no strikethrough  LABS:                        10.9   5.73  )-----------( 263      ( 17 Jul 2022 06:50 )             34.3   CAPILLARY BLOOD GLUCOSE        RECENT CULTURES:   RADIOLOGY & ADDITIONAL TESTS:

## 2022-07-17 NOTE — PROGRESS NOTE ADULT - ASSESSMENT
63 y/o female with     7/12/22 - Pt seen and examined at bedside. denies R hip pain or incisional discomfort at present. R anterior hip appears edematous, however no overt purulence or bloody discharge noted. scant ecchymosis noted R lateral thigh. CRP elevated at 16, ESR elevated at 25, WBC increased from 15K - 16K. trend cbc, temp curves. ID Dr. Anglin following, plan to restart vanco and rec drainage, cx to obtained. Will resume diet today as no plan for immediate OR, defer to ortho to make pt NPO once they plan on an intervention. IR procedure noted -- possible US guided drainage planned.     7/13/22 - drainage bag w/ blood and purulent fluid, WBC improved today on Vanco/Zosyn. s/p IR drainage today, follow cx data. Will need I&D in OR, plan for drainage tonight per ortho. NPO for procedure, resume AC postop.     7/14/22 - POD#1 s/p I&D. added celebrex at pt request -- monitor H/H, caution with NSAIDs while on Xarelto, and with hx Castro's esophagus. OR cx prelim with E.coli, c/w Zosyn, Vanco, f/u final cx data and sensitivities.    7/16/2022 - following surgical cultures for final abx recommendations. plan for [place] on d/c    7/17/2022 - discussed d/c plan with patient, she will go home with home pt.  wound cultures are growing e.coli (pan sensitive) Vancomycin stopped, c/w Ceftriaxone, ID input for duration of abx therapy.      Problem/Plan - 1:  ·  Problem: Acute blood Loss Anemia  ·  Plan: type and screen  - transfused 2Uprbc during this hospitalization      Problem/Plan - 2:  ·  Problem: Septic arthritis.   ·  Plan: s/p ID  - ID recc appreciated   - growing E. Coli     Problem/Plan - 3:  ·  Problem: Chronic anxiety.   ·  Plan: cymbalta, trazodone.     Problem/Plan - 4:  ·  Problem: Hypothyroidism.   ·  Plan: synthroid.

## 2022-07-17 NOTE — PROGRESS NOTE ADULT - SUBJECTIVE AND OBJECTIVE BOX
Patient seen and examined at bedside. Pain well controlled with medication. Patient denies any numbness, tingling, weakness, or any other orthopedic complaint. Pt reports working well with PT. Denies N/V/CP/SOB. Multiple cultures growing out E. coli.    LABS:                        10.9   5.73  )-----------( 263      ( 17 Jul 2022 06:50 )             34.3     07-16    142  |  106  |  7   ----------------------------<  94  4.2   |  30  |  0.82    Ca    9.1      16 Jul 2022 05:40            VITAL SIGNS:  T(C): 36.6 (07-17-22 @ 05:15), Max: 37 (07-17-22 @ 00:17)  HR: 65 (07-17-22 @ 05:15) (65 - 73)  BP: 127/84 (07-17-22 @ 05:15) (110/68 - 127/84)  RR: 18 (07-17-22 @ 05:15) (16 - 18)  SpO2: 99% (07-17-22 @ 05:15) (94% - 99%)      PE:  General: NAD, resting comfortably in bed  RLE:   mild erythema around incision  increased swelling and induration around R hip  ROME Dressing intact  SCDs present bilaterally  Compartments soft and compressible  No calf tenderness bilaterally  +TA/EHL/FHL/GSC  SILT L2-S1  + DP pulses  Foot warm / well perfused    A/P:  62F s/p superficial irrigation and debridement for superficial abscess above the fascia s/p R aTHA (6/29) POD 4    -Medical management appreciated  -PT/OT   -WBAT RLE  -Pain Control prn  -DVT ppx: Xarelto  -SCDs encouraged  -Continue abx per ID - currently Vanc/rocephin  -OR Cx: E.coli   -IR Superficial aspiration Cx: E coli susceptible to ceftriaxone  -FU AM Labs  -FU BCx: NGTD  -Rest, ice the extremity as needed  -Incentive Spirometry  -Encourage ambulation  -Dispo: MOY per PT, patient requesting ORZAC.   -No further acute orthopedic surgical intervention indicated  -Will discuss with attending Dr. Cortez and advise if any changes to plan Patient seen and examined at bedside. Pain well controlled with medication. Patient denies any numbness, tingling, weakness, or any other orthopedic complaint. Pt reports working well with PT. Denies N/V/CP/SOB. Multiple cultures growing out E. coli.    LABS:                        10.9   5.73  )-----------( 263      ( 17 Jul 2022 06:50 )             34.3     07-16    142  |  106  |  7   ----------------------------<  94  4.2   |  30  |  0.82    Ca    9.1      16 Jul 2022 05:40            VITAL SIGNS:  T(C): 36.6 (07-17-22 @ 05:15), Max: 37 (07-17-22 @ 00:17)  HR: 65 (07-17-22 @ 05:15) (65 - 73)  BP: 127/84 (07-17-22 @ 05:15) (110/68 - 127/84)  RR: 18 (07-17-22 @ 05:15) (16 - 18)  SpO2: 99% (07-17-22 @ 05:15) (94% - 99%)      PE:  General: NAD, resting comfortably in bed  RLE:   erythema around incision improved  decreased swelling around R hip  ROME Dressing intact  SCDs present bilaterally  Compartments soft and compressible  No calf tenderness bilaterally  +TA/EHL/FHL/GSC  SILT L2-S1  + DP pulses  Foot warm / well perfused    A/P:  62F s/p superficial irrigation and debridement for superficial abscess above the fascia s/p R aTHA (6/29) POD 4    -Medical management appreciated  -PT/OT   -WBAT RLE  -Pain Control prn  -DVT ppx: Xarelto  -Continue abx per ID - currently Vanc/rocephin  -OR Cx: E.coli   -IR Superficial aspiration Cx: E coli susceptible to ceftriaxone  -FU AM Labs  -FU BCx: NGTD  -Rest, ice the extremity as needed  -Incentive Spirometry  -Encourage ambulation  -Dispo: MOY per PT, patient requesting ORZAC.   -No further acute orthopedic surgical intervention indicated  -Will discuss with attending Dr. Cortez and advise if any changes to plan Patient seen and examined at bedside. Pain well controlled with medication. Patient denies any numbness, tingling, weakness, or any other orthopedic complaint. Pt reports working well with PT. Denies N/V/CP/SOB.      LABS:                        10.9   5.73  )-----------( 263      ( 17 Jul 2022 06:50 )             34.3     07-16    142  |  106  |  7   ----------------------------<  94  4.2   |  30  |  0.82    Ca    9.1      16 Jul 2022 05:40      VITAL SIGNS:  T(C): 36.6 (07-17-22 @ 05:15), Max: 37 (07-17-22 @ 00:17)  HR: 65 (07-17-22 @ 05:15) (65 - 73)  BP: 127/84 (07-17-22 @ 05:15) (110/68 - 127/84)  RR: 18 (07-17-22 @ 05:15) (16 - 18)  SpO2: 99% (07-17-22 @ 05:15) (94% - 99%)      PE:  General: NAD, resting comfortably in bed  RLE:   erythema around incision improved  decreased swelling around R hip  ROME Dressing intact  SCDs present bilaterally  Compartments soft and compressible  No calf tenderness bilaterally  +TA/EHL/FHL/GSC  SILT L2-S1  + DP pulses  Foot warm / well perfused    A/P:  62F s/p superficial irrigation and debridement for superficial abscess above the fascia s/p R aTHA (6/29) POD 4    -Medical management appreciated  -PT/OT   -WBAT RLE  -Pain Control prn  -DVT ppx: Xarelto  -Continue abx per ID - currently Vanc/rocephin, pending decision by ID for possible PICC  -OR Cx: E.coli   -IR Superficial aspiration Cx: E coli susceptible to ceftriaxone  -FU AM Labs  -FU BCx: NGTD  -Rest, ice the extremity as needed  -Incentive Spirometry  -Encourage ambulation  -Dispo: MOY per PT, patient requesting ORZAC.   -No further acute orthopedic surgical intervention indicated  -Will discuss with attending Dr. Cortez and advise if any changes to plan

## 2022-07-18 ENCOUNTER — TRANSCRIPTION ENCOUNTER (OUTPATIENT)
Age: 62
End: 2022-07-18

## 2022-07-18 VITALS
HEART RATE: 73 BPM | DIASTOLIC BLOOD PRESSURE: 76 MMHG | TEMPERATURE: 98 F | OXYGEN SATURATION: 97 % | RESPIRATION RATE: 17 BRPM | SYSTOLIC BLOOD PRESSURE: 128 MMHG

## 2022-07-18 LAB
ANION GAP SERPL CALC-SCNC: 7 MMOL/L — SIGNIFICANT CHANGE UP (ref 5–17)
BUN SERPL-MCNC: 15 MG/DL — SIGNIFICANT CHANGE UP (ref 7–23)
CALCIUM SERPL-MCNC: 9.1 MG/DL — SIGNIFICANT CHANGE UP (ref 8.5–10.1)
CHLORIDE SERPL-SCNC: 108 MMOL/L — SIGNIFICANT CHANGE UP (ref 96–108)
CO2 SERPL-SCNC: 28 MMOL/L — SIGNIFICANT CHANGE UP (ref 22–31)
CREAT SERPL-MCNC: 0.71 MG/DL — SIGNIFICANT CHANGE UP (ref 0.5–1.3)
CULTURE RESULTS: SIGNIFICANT CHANGE UP
EGFR: 97 ML/MIN/1.73M2 — SIGNIFICANT CHANGE UP
FLUAV AG NPH QL: SIGNIFICANT CHANGE UP
FLUBV AG NPH QL: SIGNIFICANT CHANGE UP
GLUCOSE SERPL-MCNC: 95 MG/DL — SIGNIFICANT CHANGE UP (ref 70–99)
GRAM STN FLD: SIGNIFICANT CHANGE UP
HCT VFR BLD CALC: 33.5 % — LOW (ref 34.5–45)
HGB BLD-MCNC: 10.8 G/DL — LOW (ref 11.5–15.5)
MCHC RBC-ENTMCNC: 29.8 PG — SIGNIFICANT CHANGE UP (ref 27–34)
MCHC RBC-ENTMCNC: 32.2 G/DL — SIGNIFICANT CHANGE UP (ref 32–36)
MCV RBC AUTO: 92.5 FL — SIGNIFICANT CHANGE UP (ref 80–100)
NRBC # BLD: 0 /100 WBCS — SIGNIFICANT CHANGE UP (ref 0–0)
ORGANISM # SPEC MICROSCOPIC CNT: SIGNIFICANT CHANGE UP
ORGANISM # SPEC MICROSCOPIC CNT: SIGNIFICANT CHANGE UP
PLATELET # BLD AUTO: 285 K/UL — SIGNIFICANT CHANGE UP (ref 150–400)
POTASSIUM SERPL-MCNC: 4.4 MMOL/L — SIGNIFICANT CHANGE UP (ref 3.5–5.3)
POTASSIUM SERPL-SCNC: 4.4 MMOL/L — SIGNIFICANT CHANGE UP (ref 3.5–5.3)
RBC # BLD: 3.62 M/UL — LOW (ref 3.8–5.2)
RBC # FLD: 14.9 % — HIGH (ref 10.3–14.5)
SARS-COV-2 RNA SPEC QL NAA+PROBE: SIGNIFICANT CHANGE UP
SODIUM SERPL-SCNC: 143 MMOL/L — SIGNIFICANT CHANGE UP (ref 135–145)
SPECIMEN SOURCE: SIGNIFICANT CHANGE UP
WBC # BLD: 6.76 K/UL — SIGNIFICANT CHANGE UP (ref 3.8–10.5)
WBC # FLD AUTO: 6.76 K/UL — SIGNIFICANT CHANGE UP (ref 3.8–10.5)

## 2022-07-18 PROCEDURE — 99239 HOSP IP/OBS DSCHRG MGMT >30: CPT

## 2022-07-18 PROCEDURE — 99232 SBSQ HOSP IP/OBS MODERATE 35: CPT

## 2022-07-18 RX ORDER — RIVAROXABAN 15 MG-20MG
1 KIT ORAL
Qty: 35 | Refills: 0
Start: 2022-07-18 | End: 2022-08-21

## 2022-07-18 RX ORDER — CEFPODOXIME PROXETIL 100 MG
1 TABLET ORAL
Qty: 10 | Refills: 0
Start: 2022-07-18 | End: 2022-07-22

## 2022-07-18 RX ORDER — CLONAZEPAM 1 MG
1 TABLET ORAL ONCE
Refills: 0 | Status: DISCONTINUED | OUTPATIENT
Start: 2022-07-18 | End: 2022-07-18

## 2022-07-18 RX ADMIN — RIVAROXABAN 10 MILLIGRAM(S): KIT at 11:11

## 2022-07-18 RX ADMIN — CEFTRIAXONE 100 MILLIGRAM(S): 500 INJECTION, POWDER, FOR SOLUTION INTRAMUSCULAR; INTRAVENOUS at 11:11

## 2022-07-18 RX ADMIN — Medication 88 MICROGRAM(S): at 05:06

## 2022-07-18 RX ADMIN — CELECOXIB 200 MILLIGRAM(S): 200 CAPSULE ORAL at 05:05

## 2022-07-18 RX ADMIN — Medication 1 TABLET(S): at 11:11

## 2022-07-18 RX ADMIN — Medication 1 MILLIGRAM(S): at 09:12

## 2022-07-18 RX ADMIN — PANTOPRAZOLE SODIUM 40 MILLIGRAM(S): 20 TABLET, DELAYED RELEASE ORAL at 05:08

## 2022-07-18 NOTE — PROGRESS NOTE ADULT - PROVIDER SPECIALTY LIST ADULT
Hospitalist
Hospitalist
Infectious Disease
Orthopedics
Orthopedics
Infectious Disease
Orthopedics
Hospitalist
Hospitalist
Infectious Disease
Infectious Disease
Orthopedics
Orthopedics
Hospitalist
Hospitalist

## 2022-07-18 NOTE — PROGRESS NOTE ADULT - SUBJECTIVE AND OBJECTIVE BOX
Patient seen and examined at bedside. Pain well controlled with medication. Patient denies any numbness, tingling, weakness, or any other orthopedic complaint. Pt reports working well with PT. Denies N/V/CP/SOB/F/C. Patient reports she thinks she can go home.     LABS:                        10.9   5.73  )-----------( 263      ( 17 Jul 2022 06:50 )             34.3     07-17    142  |  107  |  17  ----------------------------<  92  4.4   |  29  |  0.73    Ca    9.2      17 Jul 2022 06:50            VITAL SIGNS:  T(C): 37.1 (07-17-22 @ 16:47), Max: 37.1 (07-17-22 @ 16:47)  HR: 80 (07-17-22 @ 16:47) (71 - 80)  BP: 112/74 (07-17-22 @ 16:47) (102/57 - 112/74)  RR: 18 (07-17-22 @ 16:47) (16 - 18)  SpO2: 96% (07-17-22 @ 16:47) (96% - 98%)    PE:  General: NAD, resting comfortably in bed  RLE:   erythema around incision improved  decreased swelling around R hip  ROME Dressing intact  SCDs present bilaterally  Compartments soft and compressible  No calf tenderness bilaterally  +TA/EHL/FHL/GSC  SILT L2-S1  + DP pulses  Foot warm / well perfused    A/P:  62F s/p superficial irrigation and debridement for superficial abscess above the fascia s/p R aTHA (6/29) POD 5    -Medical management appreciated  -PT/OT   -WBAT RLE  -Pain Control prn  -DVT ppx: Xarelto  -Continue abx per ID - currently rocephin  -Per ID No picc required, can transition to oral ABX  -OR Cx: E.coli   -IR Superficial aspiration Cx: E coli susceptible to ceftriaxone  -FU AM Labs  -BCx: NGF  -Rest, ice the extremity as needed  -Incentive Spirometry  -Encourage ambulation  -Dispo: MOY per PT note, however patient states she thinks she can go home, will have to f/u with PT re Dispo  -No further acute orthopedic surgical intervention indicated  -Orthopaedically stable for d/c when cleared by PT, ID and medically stable  -Staples to be removed in the office, likely pod 14, (7/27)  -Will discuss with attending Dr. Cortez and advise if any changes to plan

## 2022-07-18 NOTE — PROGRESS NOTE ADULT - REASON FOR ADMISSION
fever, r/o septic joint

## 2022-07-18 NOTE — PROGRESS NOTE ADULT - ASSESSMENT
62F with hip pain and acutely inflamed surgical site.   No localizing complaints/findings otherwise for source of fever  Appearance nonspecific but looks lymphangitic- Strep/seroma?  R/O PJI though ESR not impressive    7/13: s/p IR procedure in the morning, 60 ml of purulent fluid drained and the drain was placed, fluid was sent for studies. The pt is scheduled for I&D with liner exchange later today. Vancomycin IV and Zosyn IV continued.   7/14: s/p irrigation and debridement of abscess of right hip yesterday, surgical dressing is on c/d/i, surrounding area is erythematous, warm to touch, + swelling, soft and somewhat tender. The pt is afebrile, on RA, no WBC, Cr ok, LFTs ok, BCs and UC with no growth to date. Abscess culture is growing E. Coli - preliminary result, a few more collected cultures during the procedure are pending result, will continue with vancomycin and zosyn Iv. Vanco level was at 20.8 this morning, and vanco dose was held, will repeat vanco level later today, ordered.   7/15: improving, remains afebrile, on RA, no leukocytosis, Cr ok, Vanco level was repeated yesterday evening 14.5. Abscess drainage and surgical cultures are growing E. Coli, Zosyn stopped, started IV ceftriaxone, will continue with vancomycin for now.   Attending Addendum--  Case reviewed with NP Meghan Myers. Her note reviewed and modified as appropriate.   Patient personally assessed and examined.  Seen earlier today.  Patient ambulating around her room without difficulty.  No hip pain.  Feeling much better.  Denies fevers chills or rigors.  Still has a swelling in her thigh, but inflammatory changes otherwise are markedly improved.  Reviewed with orthopedic resident, infection was superficial, with intact fascia.  No concern on their part for infected prosthetic hip.  Antibiotics narrowed to ceftriaxone  Given no apparent concern for prosthetic hip infection, would give fixed limited course of antibiotics, with transition to oral antibiotics once local inflammatory process appears controlled, and then manage expectantly thereafter.  7/18: Doing well. No concern of infection on exam. Discussed with patient that TIME will be the test of cure her. "Red flags" addressed with respect to recurrence of infection. All questions answered to the best of my ability.    Suggestions--  Happy to see patient in office in follow up nonemergently  Ceftin 500mg PO Q12H x5 days  Expectant management thereafter  Reviewed with Ortho  I'll sign off at this time.   Thank you for the courtesy of this referral.    Jonathan Anglin MD  Attending Physician  [facility]  Division of Infectous Diseases  [phone]

## 2022-07-18 NOTE — PROGRESS NOTE ADULT - ASSESSMENT
63 y/o female with     7/12/22 - Pt seen and examined at bedside. denies R hip pain or incisional discomfort at present. R anterior hip appears edematous, however no overt purulence or bloody discharge noted. scant ecchymosis noted R lateral thigh. CRP elevated at 16, ESR elevated at 25, WBC increased from 15K - 16K. trend cbc, temp curves. ID Dr. Anglin following, plan to restart vanco and rec drainage, cx to obtained. Will resume diet today as no plan for immediate OR, defer to ortho to make pt NPO once they plan on an intervention. IR procedure noted -- possible US guided drainage planned.     7/13/22 - drainage bag w/ blood and purulent fluid, WBC improved today on Vanco/Zosyn. s/p IR drainage today, follow cx data. Will need I&D in OR, plan for drainage tonight per ortho. NPO for procedure, resume AC postop.     7/14/22 - POD#1 s/p I&D. added celebrex at pt request -- monitor H/H, caution with NSAIDs while on Xarelto, and with hx Castro's esophagus. OR cx prelim with E.coli, c/w Zosyn, Vanco, f/u final cx data and sensitivities.    7/16/2022 - following surgical cultures for final abx recommendations. plan for [place] on d/c    7/17/2022 - discussed d/c plan with patient, she will go home with home pt.  wound cultures are growing e.coli (pan sensitive) Vancomycin stopped, c/w Ceftriaxone, ID input for duration of abx therapy.     7/18/2022 - d/c today on oral abx, Vantin 200 mg BID x 5 days.  Case has been discussed with ID and patient at [facility].  to ascertain cost of Vantin @ pharmacy.       Problem/Plan - 1:  ·  Problem: Acute blood Loss Anemia  ·  Plan: type and screen  - transfused 2Uprbc during this hospitalization      Problem/Plan - 2:  ·  Problem: Septic arthritis.   ·  Plan: s/p ID  - ID recc appreciated   - growing E. Coli     Problem/Plan - 3:  ·  Problem: Chronic anxiety.   ·  Plan: cymbalta, trazodone.     Problem/Plan - 4:  ·  Problem: Hypothyroidism.   ·  Plan: synthroid.

## 2022-07-18 NOTE — PROGRESS NOTE ADULT - SUBJECTIVE AND OBJECTIVE BOX
Patient is a 62y old  Female who presents with a chief complaint of fever, r/o septic joint (18 Jul 2022 10:22)    HPI:  Patient is a 62F with a PMH of hypothyroidism, Migraine s/p Lumbar puncture [id number], Cesar Esophagus, anxiety/depression, chronic low back pain, Subarachnoid hemorrhage s/p clipping 1989 who presents to the ED for hip pain.  Patient recently discharged from [facility] to [facility] after she had a R THR.  Patient states she was doing well with physical therapy until around four days ago.  Patient noted increased swelling and pain from the R hip.  Patient states that over the last four days she has difficulty ambulating and now has trouble standing on the joint.  Patient also reports fever earlier today, brought to the ED for evaluation.  Patient currently complains of hip pain and frontal headache.  States the headache has been constant for four days.  Radiates down to her eyes at times.  No other complaints.  Reports recent colposcopy.  Also received antibiotics for a UTI.  Febrile in ED to 103.2, labs show leukocytosis.  Will admit to med surg.  (12 Jul 2022 01:07)    SUBJECTIVE & OBJECTIVE: Pt seen and examined at bedside.   PHYSICAL EXAM:  T(C): 36.8 (07-18-22 @ 11:29), Max: 37.1 (07-17-22 @ 16:47)  HR: 61 (07-18-22 @ 11:29) (61 - 80)  BP: 108/70 (07-18-22 @ 11:29) (102/57 - 120/83)  RR: 17 (07-18-22 @ 11:29) (16 - 18)  SpO2: 96% (07-18-22 @ 11:29) (96% - 98%) Daily     Daily I&O's Detail    17 Jul 2022 07:01  -  18 Jul 2022 07:00  --------------------------------------------------------  IN:    Oral Fluid: 600 mL  Total IN: 600 mL    OUT:  Total OUT: 0 mL    Total NET: 600 mL        GENERAL: NAD, well-groomed, well-developed  HEAD:  Atraumatic, Normocephalic  EYES: EOMI, PERRLA, conjunctiva and sclera clear  ENMT: Moist mucous membranes  NECK: Supple, No JVD  NERVOUS SYSTEM:  Alert & Oriented X3, Motor Strength 5/5 B/L upper and lower extremities; DTRs 2+ intact and symmetric  CHEST/LUNG: Clear to auscultation bilaterally; No rales, rhonchi, wheezing, or rubs  HEART: Regular rate and rhythm; No murmurs, rubs, or gallops  ABDOMEN: Soft, Nontender, Nondistended; Bowel sounds present  EXTREMITIES:  right hip with surgical site dressing applied, no strikethrough., no pain, no edema  LABS:                        10.8   6.76  )-----------( 285      ( 18 Jul 2022 06:22 )             33.5   CAPILLARY BLOOD GLUCOSE        RECENT CULTURES:   RADIOLOGY & ADDITIONAL TESTS:

## 2022-07-18 NOTE — PROGRESS NOTE ADULT - SUBJECTIVE AND OBJECTIVE BOX
[facility]  Division of Infectious Diseases  [phone]    Name: PILAR SATFFORD  Age: 62y  Gender: Female  MRN: [id number]    Interval History--  Notes reviewed.     Past Medical History--  Bipolar disorder    Hypothyroidism    Chronic radicular low back pain    Arthritis of right hip    Migraines    History of Castro&#x27;s esophagus    Osteoarthritis    Arthritis    COVID-19    History of hysteroscopy    S/P brain surgery    Brain aneurysm    H/O subarachnoid hemorrhage    History of lumbar puncture    H/O colonoscopy    S/P endoscopy        For details regarding the patient's social history, family history, and other miscellaneous elements, please refer the initial infectious diseases consultation and/or the admitting history and physical examination for this admission.    Allergies    No Known Allergies    Intolerances        Medications--  Antibiotics:  cefTRIAXone   IVPB 2000 milliGRAM(s) IV Intermittent every 24 hours    Immunologic:    Other:  celecoxib PRN  DULoxetine  gabapentin  ketorolac   Injectable PRN  lactated ringers.  levothyroxine  magnesium hydroxide Suspension PRN  melatonin  multivitamin  oxyCODONE    IR PRN  pantoprazole    Tablet  polyethylene glycol 3350  rivaroxaban  senna  traZODone      Review of Systems--  A 10-point review of systems was obtained.     Pertinent positives and negatives--  Constitutional: No fevers. No Chills. No Rigors.   Cardiovascular: No chest pain. No palpitations.  Respiratory: No shortness of breath. No cough.  Gastrointestinal: No nausea or vomiting. No diarrhea or constipation.   Psychiatric: Pleasant. Appropriate affect.    Review of systems otherwise negative except as previously noted.    Physical Examination--  Vital Signs: T(F): 98 (07-18-22 @ 05:40), Max: 98.8 (07-17-22 @ 16:47)  HR: 67 (07-18-22 @ 05:40)  BP: 120/83 (07-18-22 @ 05:40)  RR: 18 (07-18-22 @ 05:40)  SpO2: 98% (07-18-22 @ 05:40)  Wt(kg): --  General: Nontoxic-appearing Female in no acute distress.  HEENT: AT/NC. PERRL. EOMI. Anicteric. Conjunctiva pink and moist. Oropharynx clear. Dentition fair.  Neck: Not rigid. No sense of mass.  Nodes: None palpable.  Lungs: Clear bilaterally without rales, wheezing or rhonchi  Heart: Regular rate and rhythm. No Murmur. No rub. No gallop. No palpable thrill.  Abdomen: Bowel sounds present and normoactive. Soft. Nondistended. Nontender. No sense of mass. No organomegaly.  Back: No spinal tenderness. No costovertebral angle tenderness.   Extremities: No cyanosis or clubbing. No edema.   Skin: Warm. Dry. Good turgor. No rash. No vasculitic stigmata.  Psychiatric: Appropriate affect and mood for situation.         Laboratory Studies--  CBC                        10.8   6.76  )-----------( 285      ( 18 Jul 2022 06:22 )             33.5       Chemistries  07-18    143  |  108  |  15  ----------------------------<  95  4.4   |  28  |  0.71    Ca    9.1      18 Jul 2022 06:22        Culture Data    Culture - Other (collected 13 Jul 2022 20:20)  Source: Wound #3 RIGHT HIP SUPERFICIAL WOUND C/S  Final Report (16 Jul 2022 23:55):    No growth    Culture - Other (collected 13 Jul 2022 20:19)  Source: .Other #2 RIGHT HIP SUPERFICIAL WOUND C/S  Final Report (16 Jul 2022 12:06):    Rare Escherichia coli  Organism: Escherichia coli (16 Jul 2022 12:06)  Organism: Escherichia coli (16 Jul 2022 12:06)    Culture - Other (collected 13 Jul 2022 20:18)  Source: Wound #1 RIGHT HIP SUPERFICIAL WOUND C/S  Final Report (16 Jul 2022 11:27):    Few Escherichia coli  Organism: Escherichia coli (16 Jul 2022 11:27)  Organism: Escherichia coli (16 Jul 2022 11:27)    Culture - Fungal, Body Fluid (collected 13 Jul 2022 09:55)  Source: .Body Fluid Right Hip Drainage  Preliminary Report (14 Jul 2022 13:20):    Testing in progress    Culture - Body Fluid with Gram Stain (collected 13 Jul 2022 09:55)  Source: .Body Fluid Right Hip Drainage  Gram Stain (prelim) (14 Jul 2022 10:52):    Numerous polymorphonuclear leukocytes per low power field    No organisms seen  Preliminary Report (15 Jul 2022 08:51):    Few Escherichia coli  Organism: Escherichia coli (15 Jul 2022 08:50)  Organism: Escherichia coli (15 Jul 2022 08:50)    Culture - Urine (collected 11 Jul 2022 21:24)  Source: Clean Catch Clean Catch (Midstream)  Final Report (13 Jul 2022 10:41):    <10,000 CFU/mL Normal Urogenital Ana    Culture - Blood (collected 11 Jul 2022 21:10)  Source: .Blood Blood-Peripheral  Final Report (17 Jul 2022 10:01):    No Growth Final    Culture - Blood (collected 11 Jul 2022 21:00)  Source: .Blood Blood-Peripheral  Final Report (17 Jul 2022 10:01):    No Growth Final             [facility]  Division of Infectious Diseases  [phone]    Name: PILAR STAFFORD  Age: 62y  Gender: Female  MRN: [id number]    Interval History--  Notes reviewed. Seen earlier today. Doing well. Pain not an issue. Ambulating without difficulty. Denies fevers, chills, or rigors. Tolerating abx without problems. No N/V/D.      Past Medical History--  Bipolar disorder    Hypothyroidism    Chronic radicular low back pain    Arthritis of right hip    Migraines    History of Castro&#x27;s esophagus    Osteoarthritis    Arthritis    COVID-19    History of hysteroscopy    S/P brain surgery    Brain aneurysm    H/O subarachnoid hemorrhage    History of lumbar puncture    H/O colonoscopy    S/P endoscopy        For details regarding the patient's social history, family history, and other miscellaneous elements, please refer the initial infectious diseases consultation and/or the admitting history and physical examination for this admission.    Allergies    No Known Allergies    Intolerances        Medications--  Antibiotics:  cefTRIAXone   IVPB 2000 milliGRAM(s) IV Intermittent every 24 hours    Immunologic:    Other:  celecoxib PRN  DULoxetine  gabapentin  ketorolac   Injectable PRN  lactated ringers.  levothyroxine  magnesium hydroxide Suspension PRN  melatonin  multivitamin  oxyCODONE    IR PRN  pantoprazole    Tablet  polyethylene glycol 3350  rivaroxaban  senna  traZODone      Review of Systems--  A 10-point review of systems was obtained.   Review of systems otherwise negative except as previously noted.    Physical Examination--  Vital Signs: T(F): 98 (07-18-22 @ 05:40), Max: 98.8 (07-17-22 @ 16:47)  HR: 67 (07-18-22 @ 05:40)  BP: 120/83 (07-18-22 @ 05:40)  RR: 18 (07-18-22 @ 05:40)  SpO2: 98% (07-18-22 @ 05:40)  Wt(kg): --  General: Nontoxic-appearing Female in no acute distress.  HEENT: AT/NC. Anicteric. Conjunctiva pink and moist. Oropharynx clear.  Neck: Not rigid. No sense of mass.  Nodes: None palpable.  Lungs: Clear bilaterally without rales, wheezing or rhonchi  Heart: Regular rate and rhythm. No Murmur. No rub. No gallop. No palpable thrill.  Abdomen: Bowel sounds present and normoactive. Soft. Nondistended. Nontender. No sense of mass. No organomegaly.  Extremities: No cyanosis or clubbing. Suggestion of some reaccumulation of fluid, but no tenderness, warmth, redness.  Skin: Warm. Dry. Good turgor. No rash. No vasculitic stigmata.  Psychiatric: Grossly appropriate affect and mood for situation.         Laboratory Studies--  CBC                        10.8   6.76  )-----------( 285      ( 18 Jul 2022 06:22 )             33.5       Chemistries  07-18    143  |  108  |  15  ----------------------------<  95  4.4   |  28  |  0.71    Ca    9.1      18 Jul 2022 06:22        Culture Data    Culture - Other (collected 13 Jul 2022 20:20)  Source: Wound #3 RIGHT HIP SUPERFICIAL WOUND C/S  Final Report (16 Jul 2022 23:55):    No growth    Culture - Other (collected 13 Jul 2022 20:19)  Source: .Other #2 RIGHT HIP SUPERFICIAL WOUND C/S  Final Report (16 Jul 2022 12:06):    Rare Escherichia coli  Organism: Escherichia coli (16 Jul 2022 12:06)  Organism: Escherichia coli (16 Jul 2022 12:06)    Culture - Other (collected 13 Jul 2022 20:18)  Source: Wound #1 RIGHT HIP SUPERFICIAL WOUND C/S  Final Report (16 Jul 2022 11:27):    Few Escherichia coli  Organism: Escherichia coli (16 Jul 2022 11:27)  Organism: Escherichia coli (16 Jul 2022 11:27)    Culture - Fungal, Body Fluid (collected 13 Jul 2022 09:55)  Source: .Body Fluid Right Hip Drainage  Preliminary Report (14 Jul 2022 13:20):    Testing in progress    Culture - Body Fluid with Gram Stain (collected 13 Jul 2022 09:55)  Source: .Body Fluid Right Hip Drainage  Gram Stain (prelim) (14 Jul 2022 10:52):    Numerous polymorphonuclear leukocytes per low power field    No organisms seen  Preliminary Report (15 Jul 2022 08:51):    Few Escherichia coli  Organism: Escherichia coli (15 Jul 2022 08:50)  Organism: Escherichia coli (15 Jul 2022 08:50)    Culture - Urine (collected 11 Jul 2022 21:24)  Source: Clean Catch Clean Catch (Midstream)  Final Report (13 Jul 2022 10:41):    <10,000 CFU/mL Normal Urogenital Naa    Culture - Blood (collected 11 Jul 2022 21:10)  Source: .Blood Blood-Peripheral  Final Report (17 Jul 2022 10:01):    No Growth Final    Culture - Blood (collected 11 Jul 2022 21:00)  Source: .Blood Blood-Peripheral  Final Report (17 Jul 2022 10:01):    No Growth Final

## 2022-07-18 NOTE — DISCHARGE NOTE NURSING/CASE MANAGEMENT/SOCIAL WORK - PATIENT PORTAL LINK FT
You can access the FollowMyHealth Patient Portal offered by [facility] by registering at the following website: [email or web address]. By joining [employer]’s FollowMyHealth portal, you will also be able to view your health information using other applications (apps) compatible with our system.

## 2022-07-22 ENCOUNTER — APPOINTMENT (OUTPATIENT)
Dept: ORTHOPEDIC SURGERY | Facility: CLINIC | Age: 62
End: 2022-07-22
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
## 2022-07-22 DIAGNOSIS — E03.9 HYPOTHYROIDISM, UNSPECIFIED: ICD-10-CM

## 2022-07-22 DIAGNOSIS — Z80.3 FAMILY HISTORY OF MALIGNANT NEOPLASM OF BREAST: ICD-10-CM

## 2022-07-22 DIAGNOSIS — F31.9 BIPOLAR DISORDER, UNSPECIFIED: ICD-10-CM
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
## 2022-07-22 DIAGNOSIS — A41.51 SEPSIS DUE TO ESCHERICHIA COLI [E. COLI]: ICD-10-CM

## 2022-07-22 DIAGNOSIS — Z87.898 PERSONAL HISTORY OF OTHER SPECIFIED CONDITIONS: ICD-10-CM

## 2022-07-22 DIAGNOSIS — T81.44XA SEPSIS FOLLOWING A PROCEDURE, INITIAL ENCOUNTER: ICD-10-CM

## 2022-07-22 DIAGNOSIS — G89.29 OTHER CHRONIC PAIN: ICD-10-CM

## 2022-07-22 DIAGNOSIS — Z86.16 PERSONAL HISTORY OF COVID-19: ICD-10-CM

## 2022-07-22 DIAGNOSIS — G43.709 CHRONIC MIGRAINE WITHOUT AURA, NOT INTRACTABLE, WITHOUT STATUS MIGRAINOSUS: ICD-10-CM

## 2022-07-22 DIAGNOSIS — T81.43XA INFECTION FOLLOWING A PROCEDURE, ORGAN AND SPACE SURGICAL SITE, INITIAL ENCOUNTER: ICD-10-CM

## 2022-07-22 DIAGNOSIS — F41.8 OTHER SPECIFIED ANXIETY DISORDERS: ICD-10-CM

## 2022-07-22 DIAGNOSIS — M54.50 LOW BACK PAIN, UNSPECIFIED: ICD-10-CM

## 2022-07-22 DIAGNOSIS — Z20.822 CONTACT WITH AND (SUSPECTED) EXPOSURE TO COVID-19: ICD-10-CM

## 2022-07-24 ENCOUNTER — NON-APPOINTMENT (OUTPATIENT)
Age: 62
End: 2022-07-24

## 2022-07-25 ENCOUNTER — APPOINTMENT (OUTPATIENT)
Dept: ORTHOPEDIC SURGERY | Facility: CLINIC | Age: 62
End: 2022-07-25

## 2022-07-25 PROCEDURE — 99024 POSTOP FOLLOW-UP VISIT: CPT

## 2022-07-25 RX ORDER — SULFAMETHOXAZOLE AND TRIMETHOPRIM 800; 160 MG/1; MG/1
800-160 TABLET ORAL TWICE DAILY
Qty: 20 | Refills: 0 | Status: ACTIVE | COMMUNITY
Start: 2022-07-25 | End: 1900-01-01

## 2022-08-01 ENCOUNTER — APPOINTMENT (OUTPATIENT)
Dept: ORTHOPEDIC SURGERY | Facility: CLINIC | Age: 62
End: 2022-08-01

## 2022-08-01 VITALS — WEIGHT: 162 LBS | BODY MASS INDEX: 26.03 KG/M2 | HEIGHT: 66 IN

## 2022-08-01 DIAGNOSIS — I10 ESSENTIAL (PRIMARY) HYPERTENSION: ICD-10-CM

## 2022-08-01 PROCEDURE — 73502 X-RAY EXAM HIP UNI 2-3 VIEWS: CPT

## 2022-08-01 PROCEDURE — 99024 POSTOP FOLLOW-UP VISIT: CPT

## 2022-08-01 NOTE — PHYSICAL EXAM
[Right] : right hip [] : no pain with flexion and external rotation [All Views] : anteroposterior, lateral [No loss of surgical correlation. Bony alignment acceptable. Hardware in appropriate position] : No loss of surgical correlation. Bony alignment acceptable. Hardware in appropriate position

## 2022-08-01 NOTE — HISTORY OF PRESENT ILLNESS
[] : Post Surgical Visit: yes [de-identified] : 8/1/22 f/i R MEGAN, drainage has stopped, no fevers chills \par 07/25/2022 s/p righ megan 06/29/2022 pt is walking well, notes small area of drainage on bandage\par \par 6/3/22: Here to f/up right hip. Was scheduled for right MEGAN but was not medically cleared, had to get neuro workup due to history or aneurysm. Now she has been rescheduled for September. She reports continuing right hip pain which can be intense at times. \par \par  61F here with chronic right hip pain. Symptoms have worsened in recent months. She reports pain\par in her groin. trouble with socks and shoes. She has difficulty walking, using a walker. Has tried PT in the past with\par minimal relief. Has tried NSAIDs without relief.\par Currently on oxycodone 15mg 4x a day\par PMH: HTN, Thyroid\par \par \par  [de-identified] : 6.13.22 [de-identified] : Rt MEGAN

## 2022-08-03 NOTE — ASSESSMENT
[FreeTextEntry1] : 61f 12 days s/p I and D of superficial abscess\par \par Serosanguinous drainage, no signs of purulence or erythema\par Bactrim for infection ppx\par return 1 week for wound check and staple removal\par \par The patient was advised of the diagnosis. The natural history of the pathology was explained in full to the patient in layman's terms. All questions were answered. The risks and benefits of surgical and non-surgical treatment alternatives were explained in full to the patient. \par

## 2022-08-03 NOTE — HISTORY OF PRESENT ILLNESS
[9] : 9 [Constant] : constant [de-identified] : 07/25/2022 s/p righ megan 06/29/2022 pt is walking well, notes small area of drainage on bandage\par \par 6/3/22: Here to f/up right hip. Was scheduled for right MEGAN but was not medically cleared, had to get neuro workup due to history or aneurysm. Now she has been rescheduled for September. She reports continuing right hip pain which can be intense at times. \par \par  61F here with chronic right hip pain. Symptoms have worsened in recent months. She reports pain\par in her groin. trouble with socks and shoes. She has difficulty walking, using a walker. Has tried PT in the past with\par minimal relief. Has tried NSAIDs without relief.\par Currently on oxycodone 15mg 4x a day\par PMH: HTN, Thyroid\par \par \par  [FreeTextEntry1] : right hip [FreeTextEntry5] : patient has surgery scheduled for September 27, she states she needs the surgery as soon as possible  [de-identified] : sx

## 2022-08-10 LAB
CULTURE RESULTS: SIGNIFICANT CHANGE UP
SPECIMEN SOURCE: SIGNIFICANT CHANGE UP

## 2022-08-15 ENCOUNTER — APPOINTMENT (OUTPATIENT)
Dept: ORTHOPEDIC SURGERY | Facility: CLINIC | Age: 62
End: 2022-08-15

## 2022-08-15 VITALS — BODY MASS INDEX: 26.03 KG/M2 | HEIGHT: 66 IN | WEIGHT: 162 LBS

## 2022-08-15 DIAGNOSIS — Z96.641 PRESENCE OF RIGHT ARTIFICIAL HIP JOINT: ICD-10-CM

## 2022-08-15 PROCEDURE — 99024 POSTOP FOLLOW-UP VISIT: CPT

## 2022-08-15 NOTE — ASSESSMENT
[FreeTextEntry1] : 61f 1mo days s/p I and D of superficial abscess\par \par Continue PT\par progress activties as tolerated\par return 10 weeks, or sooner if any issues arise\par \par The patient was advised of the diagnosis. The natural history of the pathology was explained in full to the patient in layman's terms. All questions were answered. The risks and benefits of surgical and non-surgical treatment alternatives were explained in full to the patient. \par

## 2022-08-15 NOTE — HISTORY OF PRESENT ILLNESS
[] : Post Surgical Visit: yes [5] : 5 [3] : 3 [FreeTextEntry1] : rt hip [de-identified] : 8/15/22 6 weeks s/p R MEGAN and I and D, no drainage n/v/f/c, improving steadily\par 8/1/22 f/i R MEGAN, drainage has stopped, no fevers chills \par 07/25/2022 s/p righ megan 06/29/2022 pt is walking well, notes small area of drainage on bandage\par \par 6/3/22: Here to f/up right hip. Was scheduled for right MEGAN but was not medically cleared, had to get neuro workup due to history or aneurysm. Now she has been rescheduled for September. She reports continuing right hip pain which can be intense at times. \par \par  61F here with chronic right hip pain. Symptoms have worsened in recent months. She reports pain\par in her groin. trouble with socks and shoes. She has difficulty walking, using a walker. Has tried PT in the past with\par minimal relief. Has tried NSAIDs without relief.\par Currently on oxycodone 15mg 4x a day\par PMH: HTN, Thyroid\par \par \par  [FreeTextEntry5] :  PILAR STAFFORD is a 62 year female who is here today for rt hip pain. She is doing better since last visit. States she feel stiffness when sitting. [de-identified] : Pain Meds [de-identified] : p [de-identified] : 6.13.22 [de-identified] : Rt MEGAN

## 2022-09-07 ENCOUNTER — APPOINTMENT (OUTPATIENT)
Dept: INFECTIOUS DISEASE | Facility: CLINIC | Age: 62
End: 2022-09-07

## 2022-09-07 VITALS
TEMPERATURE: 98.5 F | DIASTOLIC BLOOD PRESSURE: 84 MMHG | SYSTOLIC BLOOD PRESSURE: 132 MMHG | BODY MASS INDEX: 27.97 KG/M2 | HEIGHT: 66 IN | OXYGEN SATURATION: 96 % | HEART RATE: 61 BPM | WEIGHT: 174 LBS

## 2022-09-07 DIAGNOSIS — T81.40XD INFECTION FOLLOWING A PROCEDURE, UNSPECIFIED, SUBSEQUENT ENCOUNTER: ICD-10-CM

## 2022-09-07 PROCEDURE — 99213 OFFICE O/P EST LOW 20 MIN: CPT

## 2022-09-08 LAB
ANION GAP SERPL CALC-SCNC: 12 MMOL/L
BASOPHILS # BLD AUTO: 0.04 K/UL
BASOPHILS NFR BLD AUTO: 0.8 %
BUN SERPL-MCNC: 23 MG/DL
CALCIUM SERPL-MCNC: 8.7 MG/DL
CHLORIDE SERPL-SCNC: 105 MMOL/L
CO2 SERPL-SCNC: 26 MMOL/L
CREAT SERPL-MCNC: 0.93 MG/DL
CRP SERPL-MCNC: <3 MG/L
EGFR: 70 ML/MIN/1.73M2
EOSINOPHIL # BLD AUTO: 0.17 K/UL
EOSINOPHIL NFR BLD AUTO: 3.5 %
ERYTHROCYTE [SEDIMENTATION RATE] IN BLOOD BY WESTERGREN METHOD: 3 MM/HR
GLUCOSE SERPL-MCNC: 93 MG/DL
HCT VFR BLD CALC: 37.9 %
HGB BLD-MCNC: 11.7 G/DL
IMM GRANULOCYTES NFR BLD AUTO: 0.2 %
LYMPHOCYTES # BLD AUTO: 1.75 K/UL
LYMPHOCYTES NFR BLD AUTO: 35.6 %
MAN DIFF?: NORMAL
MCHC RBC-ENTMCNC: 30.3 PG
MCHC RBC-ENTMCNC: 30.9 GM/DL
MCV RBC AUTO: 98.2 FL
MONOCYTES # BLD AUTO: 0.43 K/UL
MONOCYTES NFR BLD AUTO: 8.7 %
NEUTROPHILS # BLD AUTO: 2.52 K/UL
NEUTROPHILS NFR BLD AUTO: 51.2 %
PLATELET # BLD AUTO: 148 K/UL
POTASSIUM SERPL-SCNC: 4.6 MMOL/L
RBC # BLD: 3.86 M/UL
RBC # FLD: 15.1 %
SODIUM SERPL-SCNC: 143 MMOL/L
WBC # FLD AUTO: 4.92 K/UL

## 2022-09-09 NOTE — PHYSICAL THERAPY INITIAL EVALUATION ADULT - MD/RN NOTIFIED
Implemented All Universal Safety Interventions:  [place] to call system. Call bell, personal items and telephone within reach. Instruct patient to call for assistance. Room bathroom lighting operational. Non-slip footwear when patient is off stretcher. Physically safe environment: no spills, clutter or unnecessary equipment. Stretcher in lowest position, wheels locked, appropriate side rails in place. yes

## 2022-09-10 NOTE — ASSESSMENT
[FreeTextEntry1] : 62-year-old woman with superficial infection of the surgical site growing E. coli.  The patient is concerned for the potential of residual infection.  Clinically this is not evident.  She is concerned about her blood work, and we will check CBC, chemistries, ESR, and CRP.  I expect these to be normal.\par \par Labs as above\par No antibiotics\par Unless there are problems or lab abnormalities, the patient may follow-up on an as-needed basis only.

## 2022-09-10 NOTE — PHYSICAL EXAM
[General Appearance - Alert] : alert [General Appearance - Well-Appearing] : healthy appearing [Sclera] : the sclera and conjunctiva were normal [Oropharynx] : the oropharynx was normal with no thrush [Neck Appearance] : the appearance of the neck was normal [Auscultation Breath Sounds / Voice Sounds] : lungs were clear to auscultation bilaterally [Heart Sounds] : normal S1 and S2 [Abdomen Soft] : soft [Abdomen Tenderness] : non-tender [Nail Clubbing] : no clubbing  or cyanosis of the fingernails [FreeTextEntry1] : Incision site is clean dry and intact.  There is minimal edema of the right thigh compared to the left side.  There is no evidence of active inflammation  [] : no rash [Oriented To Time, Place, And Person] : oriented to person, place, and time

## 2022-09-10 NOTE — HISTORY OF PRESENT ILLNESS
[FreeTextEntry1] : This is a 62-year-old woman seen at [facility] for a postoperative infection involving the right hip in July 2022.  At that point in time, she had surgical exploration and the infection was thought to be superficial.  She received a course of antibiotics versus the E. coli that was recovered from the wound, with transition to oral antibiotics for a fixed period of time.  Since that point in time she has been doing well.  She has no pain whatsoever, except for presently where she thinks she overdid it going out on a boat over the weekend.  Other than that she has had no pain whatsoever.  She denies fevers chills or rigors.  Her appetite has been good.  She has not lost weight.  She denies any night sweats.  She has some tenderness along the incision line, but no redness or drainage.  She has follow-up with orthopedics who is pleased with her progress.\par Medication list was reviewed and is unchanged compared with discharge from the hospital except for the fact that she is off the antibiotics.\par \par -----------------------\par Parts of Hospital Progress and Consult Notes\par \par  Progress Note Adult-Infectious Disease Attending [Charted Location: [address]] [Authored: 18-Jul-2022 10:22]- for Visit: [id number], Complete, Revised, Signed in Full, Available to Patient\par \par Progress Note: \par · Provider Specialty	Infectious Disease\par \par Reason for Admission: \par  Reason for Admission:\par · Reason for Admission	fever, r/o septic joint\par \par \par · Subjective and Objective: \par [facility]\par Division of Infectious Diseases\par [phone]\par \par Name: PILAR STAFFORD\par Age: 62y\par Gender: Female\par MRN: [id number]\par \par Interval History--\par Notes reviewed. Seen earlier today. Doing well. Pain not an issue. Ambulating without difficulty. Denies fevers, chills, or rigors. Tolerating abx without problems. No N/V/D.\par \par \par Past Medical History--\par Bipolar disorder\par \par Hypothyroidism\par \par Chronic radicular low back pain\par \par Arthritis of right hip\par \par Migraines\par \par History of Castro&#x27;s esophagus\par \par Osteoarthritis\par \par Arthritis\par \par COVID-19\par \par History of hysteroscopy\par \par S/P brain surgery\par \par Brain aneurysm\par \par H/O subarachnoid hemorrhage\par \par History of lumbar puncture\par \par H/O colonoscopy\par \par S/P endoscopy\par \par \par \par For details regarding the patient's social history, family history, and other miscellaneous elements, please refer the initial infectious diseases consultation and/or the admitting history and physical examination for this admission.\par \par Allergies\par \par No Known Allergies\par \par Intolerances\par \par \par \par Medications--\par Antibiotics:\par cefTRIAXone   IVPB 2000 milliGRAM(s) IV Intermittent every 24 hours\par \par Immunologic:\par \par Other:\par celecoxib PRN\par DULoxetine\par gabapentin\par ketorolac   Injectable PRN\par lactated ringers.\par levothyroxine\par magnesium hydroxide Suspension PRN\par melatonin\par multivitamin\par oxyCODONE    IR PRN\par pantoprazole    Tablet\par polyethylene glycol 3350\par rivaroxaban\par senna\par traZODone\par \par \par Review of Systems--\par A 10-point review of systems was obtained. \par Review of systems otherwise negative except as previously noted.\par \par Physical Examination--\par Vital Signs: T(F): 98 (07-18-22 @ 05:40), Max: 98.8 (07-17-22 @ 16:47)\par HR: 67 (07-18-22 @ 05:40)\par BP: 120/83 (07-18-22 @ 05:40)\par RR: 18 (07-18-22 @ 05:40)\par SpO2: 98% (07-18-22 @ 05:40)\par Wt(kg): --\par General: Nontoxic-appearing Female in no acute distress.\par HEENT: AT/NC. Anicteric. Conjunctiva pink and moist. Oropharynx clear.\par Neck: Not rigid. No sense of mass.\par Nodes: None palpable.\par Lungs: Clear bilaterally without rales, wheezing or rhonchi\par Heart: Regular rate and rhythm. No Murmur. No rub. No gallop. No palpable thrill.\par Abdomen: Bowel sounds present and normoactive. Soft. Nondistended. Nontender. No sense of mass. No organomegaly.\par Extremities: No cyanosis or clubbing. Suggestion of some reaccumulation of fluid, but no tenderness, warmth, redness.\par Skin: Warm. Dry. Good turgor. No rash. No vasculitic stigmata.\par Psychiatric: Grossly appropriate affect and mood for situation. \par \par \par \par Laboratory Studies--\par CBC\par          \par            10.8 \par 6.76  )-----------( 285      ( 18 Jul 2022 06:22 )\par            33.5 \par \par \par Chemistries\par 07-18\par \par 143  |  108  |  15\par ----------------------------<  95\par 4.4   |  28  |  0.71\par \par Ca    9.1      18 Jul 2022 06:22\par \par \par \par Culture Data\par \par Culture - Other (collected 13 Jul 2022 20:20)\par Source: Wound #3 RIGHT HIP SUPERFICIAL WOUND C/S\par Final Report (16 Jul 2022 23:55):\par   No growth\par \par Culture - Other (collected 13 Jul 2022 20:19)\par Source: .Other #2 RIGHT HIP SUPERFICIAL WOUND C/S\par Final Report (16 Jul 2022 12:06):\par   Rare Escherichia coli\par Organism: Escherichia coli (16 Jul 2022 12:06)\par Organism: Escherichia coli (16 Jul 2022 12:06)\par \par Culture - Other (collected 13 Jul 2022 20:18)\par Source: Wound #1 RIGHT HIP SUPERFICIAL WOUND C/S\par Final Report (16 Jul 2022 11:27):\par   Few Escherichia coli\par Organism: Escherichia coli (16 Jul 2022 11:27)\par Organism: Escherichia coli (16 Jul 2022 11:27)\par \par Culture - Fungal, Body Fluid (collected 13 Jul 2022 09:55)\par Source: .Body Fluid Right Hip Drainage\par Preliminary Report (14 Jul 2022 13:20):\par   Testing in progress\par \par Culture - Body Fluid with Gram Stain (collected 13 Jul 2022 09:55)\par Source: .Body Fluid Right Hip Drainage\par Gram Stain (prelim) (14 Jul 2022 10:52):\par   Numerous polymorphonuclear leukocytes per low power field\par   No organisms seen\par Preliminary Report (15 Jul 2022 08:51):\par   Few Escherichia coli\par Organism: Escherichia coli (15 Jul 2022 08:50)\par Organism: Escherichia coli (15 Jul 2022 08:50)\par \par Culture - Urine (collected 11 Jul 2022 21:24)\par Source: Clean Catch Clean Catch (Midstream)\par Final Report (13 Jul 2022 10:41):\par   <10,000 CFU/mL Normal Urogenital Ana\par \par Culture - Blood (collected 11 Jul 2022 21:10)\par Source: .Blood Blood-Peripheral\par Final Report (17 Jul 2022 10:01):\par   No Growth Final\par \par Culture - Blood (collected 11 Jul 2022 21:00)\par Source: .Blood Blood-Peripheral\par Final Report (17 Jul 2022 10:01):\par   No Growth Final\par \par Assessment and Plan: \par · Assessment	\par 62F with hip pain and acutely inflamed surgical site. \par No localizing complaints/findings otherwise for source of fever\par Appearance nonspecific but looks lymphangitic- Strep/seroma?\par R/O PJI though ESR not impressive\par \par 7/13: s/p IR procedure in the morning, 60 ml of purulent fluid drained and the drain was placed, fluid was sent for studies. The pt is scheduled for I&D with liner exchange later today. Vancomycin IV and Zosyn IV continued. \par 7/14: s/p irrigation and debridement of abscess of right hip yesterday, surgical dressing is on c/d/i, surrounding area is erythematous, warm to touch, + swelling, soft and somewhat tender. The pt is afebrile, on RA, no WBC, Cr ok, LFTs ok, BCs and UC with no growth to date. Abscess culture is growing E. Coli - preliminary result, a few more collected cultures during the procedure are pending result, will continue with vancomycin and zosyn Iv. Vanco level was at 20.8 this morning, and vanco dose was held, will repeat vanco level later today, ordered. \par 7/15: improving, remains afebrile, on RA, no leukocytosis, Cr ok, Vanco level was repeated yesterday evening 14.5. Abscess drainage and surgical cultures are growing E. Coli, Zosyn stopped, started IV ceftriaxone, will continue with vancomycin for now. \par Attending Addendum--\par Case reviewed with NP Meghan Myers. Her note reviewed and modified as appropriate. \par Patient personally assessed and examined.\par Seen earlier today.  Patient ambulating around her room without difficulty.  No hip pain.  Feeling much better.  Denies fevers chills or rigors.  Still has a swelling in her thigh, but inflammatory changes otherwise are markedly improved.\par Reviewed with orthopedic resident, infection was superficial, with intact fascia.  No concern on their part for infected prosthetic hip.\par Antibiotics narrowed to ceftriaxone\par Given no apparent concern for prosthetic hip infection, would give fixed limited course of antibiotics, with transition to oral antibiotics once local inflammatory process appears controlled, and then manage expectantly thereafter.\par 7/18: Doing well. No concern of infection on exam. Discussed with patient that TIME will be the test of cure her. "Red flags" addressed with respect to recurrence of infection. All questions answered to the best of my ability.\par \par Suggestions--\par Happy to see patient in office in follow up nonemergently\par Ceftin 500mg PO Q12H x5 days\par Expectant management thereafter\par Reviewed with Ortho\par I'll sign off at this time. \par Thank you for the courtesy of this referral.\par \par Jonathan Anglin MD\par Attending Physician\par [facility]\par Division of Infectous Diseases\par [phone]\par   \par \par \par Electronic Signatures:\par Jonathan Anglin)  (Signed 18-Jul-2022 15:27)\par 	Authored: Progress Note, Reason for Admission, Subjective and Objective, Assessment and Plan\par \par \par Last Updated: 18-Jul-2022 15:27 by Jonathan Anglin (MD)\par \par \par  Consult Note Adult-Infectious Disease Attending [Charted Location: [facility] 1E 190 D] [Authored: 12-Jul-2022 13:42]- for Visit: [id number], Complete, Revised, Signed in Full, Available to Patient\par \par Consult Note: \par  Provider Specialty:\par Infectious Disease.\par \par Referral/Consultation: \par  Initial Consult:\par · Requested by Name:	Dr. Stephens\par · Date/Time:	12-Jul-2022 13:42\par · Reason for Referral/Consultation:	R/O PJI\par · Reason for Admission	fever, r/o septic joint\par \par \par · Subjective and Objective: \par Full note to follow\par Hip pain beginning 7/7, followed by fever/chills/rigors.\par R anterior approach incison w minimal serous drainage, marked inflammatory changes. \par Appearance nonspecific but looks lymphangitic- Strep/seroma?\par R/O PJI\par Needs drainage-- I've paged ortho\par Add back vanco\par Watch kidney function in the setting of zosyn use\par Target vanco trough 15-20\par F/U Cx data\par Trend CBC, inflammatory markers\par Left message for Dr. Singh\par D/W patient\par Thank you for the courtesy of this referral.\par Jonathan Anglin MD\par Attending Physician\par [facility]\par Division of Infectious Diseases\par [phone]\par ----------------\par [facility]\par Division of Infectious Diseases\par [phone]\par \par PILAR STAFFORD\par 62y, Female\par [id number]\par \par HPI--\par 62F with PMH Castro's esophagus, bipolar d/o and borderline traits per prior psych consult, SAH/CNS aneurysm s/p clipping, who underwent R THR on 6/29 for OA and presents back to the hospital with fever and R hip pain. Patient states the pain began 7/7 ad was followed by fevers, chills, or rigors. CT here with collection, though no clear joint involvement. Patient denies trauma. No other localizing complaints. \par \par PMH/PSH--\par Bipolar disorder\par \par Hypothyroidism\par \par Chronic radicular low back pain\par \par Arthritis of right hip\par \par Migraines\par \par History of Castro&#x27;s esophagus\par \par Osteoarthritis\par \par Arthritis\par \par COVID-19\par \par History of hysteroscopy\par \par S/P brain surgery\par \par Brain aneurysm\par \par H/O subarachnoid hemorrhage\par \par History of lumbar puncture\par \par H/O colonoscopy\par \par S/P endoscopy\par \par \par \par Allergies--\par No Known Allergies\par \par \par Medications--\par Antibiotics: piperacillin/tazobactam IVPB.. 3.375 Gram(s) IV Intermittent every 8 hours\par \par Immunologic: \par Other: DULoxetine\par gabapentin\par lactated ringers.\par levothyroxine\par multivitamin\par oxyCODONE    IR PRN\par pantoprazole    Tablet\par traZODone\par \par Antimicrobials last 90 days per EMR: MEDICATIONS  (STANDING):\par piperacillin/tazobactam IVPB..\par  25 mL/Hr IV Intermittent (07-12-22 @ 05:39)\par \par piperacillin/tazobactam IVPB...\par  200 mL/Hr IV Intermittent (07-11-22 @ 21:22)\par \par vancomycin  IVPB.\par  250 mL/Hr IV Intermittent (07-11-22 @ 22:42)\par \par \par \par Social History--\par EtOH: rare\par Tobacco: prior\par Drug Use: denies \par \par Family/Marital History--\par FH: breast cancer (Mother)\par \par \par \par Review of Systems:\par A >=10-point review of systems was obtained. \par Review of systems otherwise negative except as previously noted.\par \par Physical Exam--\par Vital Signs: T(F): 99.2 (07-12-22 @ 10:26), Max: 103.2 (07-11-22 @ 20:48)\par HR: 84 (07-12-22 @ 10:26)\par BP: 109/75 (07-12-22 @ 10:26)\par RR: 18 (07-12-22 @ 10:26)\par SpO2: 99% (07-12-22 @ 10:26)\par Wt(kg): --\par General: Nontoxic-appearing Female in no acute distress.\par HEENT: AT/NC. Anicteric. Conjunctiva pink and moist. Oropharynx clear.\par Neck: Not rigid. No sense of mass.\par Nodes: None palpable.\par Lungs: Clear bilaterally without rales, wheezing or rhonchi\par Heart: Regular rate and rhythm. No Murmur. No rub. No gallop. No palpable thrill.\par Abdomen: Bowel sounds present and normoactive. Soft. Nondistended. Nontender. No sense of mass. No organomegaly.\par Back: No spinal tenderness. No costovertebral angle tenderness. \par Extremities: No cyanosis or clubbing. R anterior approach incison w minimal serous drainage, marked inflammatory changes. Tender, warm, red, with lymphangitic appearance\par Skin: Warm. Dry. Good turgor. No rash. No vasculitic stigmata.\par Psychiatric: Grossly appropriate affect and mood for situation. \par \par \par Laboratory & Imaging Data--\par CBC\par          \par            9.3  \par 16.83 )-----------( 237      ( 12 Jul 2022 05:52 )\par            28.6 \par \par Sedimentation Rate, Erythrocyte: 25 mm/hr (07.11.22 @ 21:00)\par C-Reactive Protein, Serum: 16 mg/L (07.11.22 @ 21:00)\par \par \par \par Chemistries\par 07-12\par \par 141  |  107  |  26<H>\par ----------------------------<  97\par 4.2   |  26  |  0.82\par \par Ca    8.4<L>      12 Jul 2022 05:52\par \par TPro  6.4  /  Alb  3.0<L>  /  TBili  0.4  /  DBili  x   /  AST  15  /  ALT  14  /  AlkPhos  72  07-11\par \par Urinalysis (07.11.22 @ 21:24)\par   Glucose Qualitative, Urine: Negative mg/dL\par   Blood, Urine: Small\par   pH Urine: 5.0\par   Color: Yellow\par   Urine Appearance: Clear\par   Bilirubin: Negative\par   Ketone - Urine: Negative\par   Specific Gravity: 1.010\par   Protein, Urine: Negative mg/dL\par   Urobilinogen: Negative mg/dL\par   Nitrite: Negative\par   Leukocyte Esterase Concentration: Negative\par Urine Microscopic-Add On (NC) (07.11.22 @ 21:24)\par   Red Blood Cell - Urine: 0-2 /HPF\par   White Blood Cell - Urine: Negative\par   Epithelial Cells: Occasional\par \par \par \par Culture Data\par None\par \par < from: Xray Chest 1 View- PORTABLE-Urgent (07.11.22 @ 22:16) >\par \par IMPRESSION:  Clear lungs.\par \par \par < end of copied text >\par \par \par < from: CT Abdomen and Pelvis w/ IV Cont (07.11.22 @ 22:21) >\par \par ACC: [id number] EXAM:  CT ABDOMEN AND PELVIS IC                      \par \par PROCEDURE DATE:  07/11/2022  \par \par \par \par INTERPRETATION:  CLINICAL INFORMATION: Status post right total hip \par replacement, drainage from surgical site, rule out infection.\par \par COMPARISON: None.\par \par CONTRAST/COMPLICATIONS:\par IV Contrast: Omnipaque 350  85 cc administered   4 cc discarded\par Oral Contrast: NONE\par Complications: None reported at time of study completion\par \par PROCEDURE:\par CT of the Abdomen and Pelvis was performed.\par [place] coronal reformats were performed.\par \par FINDINGS:\par LOWER CHEST: Within normal limits.\par \par LIVER: Within normal limits.\par BILE DUCTS: Normal caliber.\par GALLBLADDER: Within normal limits.\par SPLEEN: Within normal limits.\par PANCREAS: Within normal limits.\par ADRENALS: Within normal limits.\par KIDNEYS/URETERS: Within normal limits. Left renal cysts measuring 1.2 and \par 2.8 cm. Subcentimeter hypodense renal lesion, too small to characterize, \par possibly tiny cyst.\par \par BLADDER: Mildly compromised evaluation secondary to streak artifact from \par adjacent right hip prosthesis. Grossly normal.\par REPRODUCTIVE ORGANS: Uterus and adnexa within normal limits.\par \par BOWEL: No bowel obstruction.\par PERITONEUM: No ascites.\par VESSELS: Within normal limits. Mild atherosclerotic disease.\par RETROPERITONEUM/LYMPH NODES: No lymphadenopathy.\par ABDOMINAL WALL: Soft tissue stranding in the anterior soft tissues of the \par right hip presumably related to recent total hip arthroplasty. Adjacent \par of the loculated fluid collection measures approximately 2.2 x 4.1 x 1.3 \par cm (AP by transverse by CC); no definite associated peripheral \par enhancement. Findings suggestive of postoperative fluid/seroma, very \par early abscess formation is less likely.\par BONES: Right hip arthroplasty without evidence of acute hardware \par complication. Spinal degenerative changes. No acute osseous findings. \par Anterolisthesis L4 on L5 likely degenerative.\par \par IMPRESSION:\par Status post right hip arthroplasty. Soft tissue stranding in the anterior \par soft tissues with adjacent loculated fluid collection measuring up to 4.1 \par cm. No definite associated peripheral contrast enhancement. Findings are \par suggestive of postoperative fluid/seroma, early abscess formation is less \par likely.\par \par --- End of Report ---\par \par \par \par \par \par SILAS BONILLA MD; Attending Radiologist\par This document has been electronically signed. Jul 11 2022 11:33PM\par \par < end of copied text >\par \par \par \par \par Assessment and Recommendation: \par · Assessment	\par 62F with hip pain and acutely inflamed surgical site. \par No localizing complaints/findings otherwise for source of fever\par Appearance nonspecific but looks lymphangitic- Strep/seroma?\par R/O PJI though ESR not impressive\par \par Suggestions--\par Needs drainage-- I've paged ortho\par Add back vanco\par Watch kidney function in the setting of zosyn use\par Target vanco trough 15-20\par F/U Cx data\par Trend CBC, inflammatory markers\par Left message for Dr. Singh\par D/W patient\par Thank you for the courtesy of this referral.\par Jonathan Anglin MD\par Attending Physician\par [facility]\par Division of Infectious Diseases\par [phone]  \par \par \par Electronic Signatures:\par Jonathan Anglin (MD)  (Signed 13-Jul-2022 12:33)\par 	Authored: Consult Note, Referral/Consultation, Subjective and Objective, Assessment and Recommendation\par \par \par Last Updated: 13-Jul-2022 12:33 by Jonathan Anglin (MD)\par \par \par \par

## 2022-10-18 NOTE — H&P PST ADULT - SKIN
----- Message from Edgardo Harris MD sent at 10/18/2022  7:18 AM EDT -----  PIP iron levels still low; set up 2 additional injectafers  
Attempted to reach patient regarding lab results. Left message to return our call.    
Informed patient of need for IV Injectafer. Patient verbalized understanding.    
warm and dry

## 2022-10-28 ENCOUNTER — APPOINTMENT (OUTPATIENT)
Dept: ORTHOPEDIC SURGERY | Facility: CLINIC | Age: 62
End: 2022-10-28

## 2022-10-28 PROCEDURE — 99214 OFFICE O/P EST MOD 30 MIN: CPT

## 2022-10-28 PROCEDURE — 73503 X-RAY EXAM HIP UNI 4/> VIEWS: CPT | Mod: RT

## 2022-10-28 NOTE — PHYSICAL EXAM
[Right] : right hip [All Views] : anteroposterior, lateral [No loss of surgical correlation. Bony alignment acceptable. Hardware in appropriate position] : No loss of surgical correlation. Bony alignment acceptable. Hardware in appropriate position [] : no pain with flexion and external rotation

## 2022-10-28 NOTE — HISTORY OF PRESENT ILLNESS
[5] : 5 [3] : 3 [] : Post Surgical Visit: yes [de-identified] : 10/28/22: F/U R hip- pt reports some pain along her anterior and medial thigh with spasms. . States abscess has improved. Reporting muscle spasms in the groin. Completed PT. She is feeling better than before surery. States abscess has resolved. denies n/v/f/c\par \par 8/15/22 6 weeks s/p R MEGAN and I and D, no drainage n/v/f/c, improving steadily\par 8/1/22 f/i R MEGAN, drainage has stopped, no fevers chills \par 07/25/2022 s/p righ megan 06/29/2022 pt is walking well, notes small area of drainage on bandage\par \par 6/3/22: Here to f/up right hip. Was scheduled for right MEGAN but was not medically cleared, had to get neuro workup due to history or aneurysm. Now she has been rescheduled for September. She reports continuing right hip pain which can be intense at times. \par \par  61F here with chronic right hip pain. Symptoms have worsened in recent months. She reports pain\par in her groin. trouble with socks and shoes. She has difficulty walking, using a walker. Has tried PT in the past with\par minimal relief. Has tried NSAIDs without relief.\par Currently on oxycodone 15mg 4x a day\par PMH: HTN, Thyroid\par \par \par  [FreeTextEntry1] : rt hip [FreeTextEntry5] :  PILAR STAFFORD is a 62 year female who is here today for rt hip pain. She is doing better since last visit. States she feel stiffness when sitting. [de-identified] : Pain Meds [de-identified] : p [de-identified] : 6.13.22 [de-identified] : Rt MEGAN

## 2022-10-28 NOTE — ASSESSMENT
[FreeTextEntry1] : 61f 4mo days s/p R MEGAN\par \par Continue HEP\par progress activities as tolerated\par return 3mos, or sooner if any issues arise\par \par The patient was advised of the diagnosis. The natural history of the pathology was explained in full to the patient in layman's terms. All questions were answered. The risks and benefits of surgical and non-surgical treatment alternatives were explained in full to the patient. \par

## 2022-11-04 NOTE — ED ADULT NURSE NOTE - NSICDXPASTMEDICALHX_GEN_ALL_CORE_FT
11/4/2022      Chief Complaint   Patient presents with    Diabetes     Follow up    Annual Wellness Visit              History of Present Illness:         is a 72 y.o. male returns to follow up DM, HTN, and lipids. Weight is up some. Has only been taking 1.5 of his metformin because his stool is looser. Not diarrhea. Allergies   Allergen Reactions    Oxycodone Rash       Current Outpatient Medications   Medication Sig    metFORMIN ER (GLUCOPHAGE XR) 750 mg tablet Take 1 Tablet by mouth two (2) times a day. atorvastatin (LIPITOR) 10 mg tablet Take 1 Tablet by mouth nightly. dapagliflozin (FARXIGA) 5 mg tab tablet Take 1 Tablet by mouth daily. indomethacin (INDOCIN) 25 mg capsule TAKE 1 OR 2 CAPSULES BY MOUTH EVERY 8 HOURS AS NEEDED FOR GOUT PAIN    varicella-zoster recombinant, PF, (Shingrix, PF,) 50 mcg/0.5 mL susr injection 0.5mL by IntraMUSCular route once now and then repeat in 2-6 months (Patient not taking: No sig reported)    clotrimazole-betamethasone (LOTRISONE) topical cream Apply to affected areas twice daily for 2 weeks. (Patient not taking: Reported on 11/4/2022)    clindamycin (CLEOCIN) 300 mg capsule Take 1 Capsule by mouth four (4) times daily. (Patient not taking: No sig reported)     No current facility-administered medications for this visit. Physical Examination:    Visit Vitals  BP (!) 142/88   Pulse 62   Temp 96.8 °F (36 °C) (Temporal)   Resp 16   Ht 5' 8\" (1.727 m)   Wt 221 lb 6.4 oz (100.4 kg)   SpO2 98%   BMI 33.66 kg/m²      General:  Alert, cooperative, no distress. HEENT:  Normocephalic, without obvious abnormality, atraumatic. Conjunctivae/corneas clear. Pupils equal, round, reactive to light. Extraocular movements intact. TMs and external canals normal bilaterally. Nasal mucosa and oropharynx clear. Lungs: Clear to auscultation bilaterally. Chest wall:  No tenderness or deformity.    Heart:  Regular rate and rhythm, S1, S2 normal, no murmur, click, rub, or gallop. Abdomen:   Soft, non-tender. Bowel sounds normal. No masses. No organomegaly. Extremities: Extremities normal, atraumatic, no cyanosis or edema. Pulses: 2+ and symmetric all extremities. Skin: Skin color, texture, turgor normal. No rashes or lesions. Lymph nodes: Cervical, supraclavicular, and axillary nodes normal.   Neurologic: CNII-XII intact. Normal strength, sensation, and reflexes throughout. Diabetic Foot Exam:  Both feet are examined and demonstrate intact DP pulses. There is good capillary refill and sensation is intact. Skin is intact and there are no ulcers or sores. Results for orders placed or performed in visit on 11/04/22   AMB POC HEMOGLOBIN A1C   Result Value Ref Range    Hemoglobin A1c (POC) 6.9 %           ASSESSMENT AND PLAN    1. Type 2 diabetes mellitus without complication, without long-term current use of insulin (HCC)  Increase metformin and add dapagliflozin. Recheck in three months  - AMB POC HEMOGLOBIN A1C  - MICROALBUMIN, UR, RAND W/ MICROALB/CREAT RATIO; Future  - MICROALBUMIN, UR, RAND W/ MICROALB/CREAT RATIO  - LIPID PANEL; Future  - CBC WITH AUTOMATED DIFF; Future  - LIPID PANEL  - CBC WITH AUTOMATED DIFF  -  DIABETES FOOT EXAM  - metFORMIN ER (GLUCOPHAGE XR) 750 mg tablet; Take 1 Tablet by mouth two (2) times a day. Dispense: 180 Tablet; Refill: 1  - dapagliflozin (FARXIGA) 5 mg tab tablet; Take 1 Tablet by mouth daily. Dispense: 90 Tablet; Refill: 1    2. Hx of gout    - URIC ACID; Future  - URIC ACID    3. Hyperlipidemia LDL goal <70    - LIPID PANEL; Future  - METABOLIC PANEL, COMPREHENSIVE; Future  - LIPID PANEL  - METABOLIC PANEL, COMPREHENSIVE  - atorvastatin (LIPITOR) 10 mg tablet; Take 1 Tablet by mouth nightly. Dispense: 90 Tablet; Refill: 1    4. BPH with obstruction/lower urinary tract symptoms    - PSA W/ REFLX FREE PSA; Future  - PSA W/ REFLX FREE PSA    5. Medicare annual wellness visit, subsequent      6.  Encounter for immunization    - INFLUENZA, FLUAD, (AGE 72 Y+), IM, PF, 0.5 ML              Orders Placed This Encounter    Influenza, FLUAD, (age 72 y+), IM, PF, 0.5 mL    MICROALBUMIN, UR, RAND W/ MICROALB/CREAT RATIO     Standing Status:   Future     Number of Occurrences:   1     Standing Expiration Date:   11/4/2023    LIPID PANEL     Standing Status:   Future     Number of Occurrences:   1     Standing Expiration Date:   53/9/5328    METABOLIC PANEL, COMPREHENSIVE     Standing Status:   Future     Number of Occurrences:   1     Standing Expiration Date:   11/4/2023    CBC WITH AUTOMATED DIFF     Standing Status:   Future     Number of Occurrences:   1     Standing Expiration Date:   11/4/2023    URIC ACID     Standing Status:   Future     Number of Occurrences:   1     Standing Expiration Date:   11/4/2023    PSA W/ REFLX FREE PSA     Standing Status:   Future     Number of Occurrences:   1     Standing Expiration Date:   11/4/2023    AMB POC HEMOGLOBIN A1C    HM DIABETES FOOT EXAM    DISCONTD: dapagliflozin (FARXIGA) 5 mg tab tablet     Sig: Take 1 Tablet by mouth daily. Dispense:  90 Tablet     Refill:  1    metFORMIN ER (GLUCOPHAGE XR) 750 mg tablet     Sig: Take 1 Tablet by mouth two (2) times a day. Dispense:  180 Tablet     Refill:  1    atorvastatin (LIPITOR) 10 mg tablet     Sig: Take 1 Tablet by mouth nightly. Dispense:  90 Tablet     Refill:  1    dapagliflozin (FARXIGA) 5 mg tab tablet     Sig: Take 1 Tablet by mouth daily.      Dispense:  90 Tablet     Refill:  1     RTC 3 months      Austyn Samano MD PAST MEDICAL HISTORY:  Arthritis     Arthritis of right hip     Bipolar disorder     Chronic radicular low back pain     COVID-19 fatigue, fever, never hospitalized    History of Castro's esophagus     Hypothyroidism     Migraines     Osteoarthritis right hip,

## 2022-12-01 NOTE — ED PROVIDER NOTE - CROS ED ROS STATEMENT
Add High Risk Medication Management Associated Diagnosis?: Yes Detail Level: Zone Length Of Therapy: 3+ years all other ROS negative except as per HPI

## 2023-01-30 NOTE — PRE-ANESTHESIA EVALUATION ADULT - NSANTHTOBACCOSD_GEN_ALL_CORE
Outpatient Physical Therapy Ortho Treatment Note  [facility]     Patient Name: Alex Guadalupe  : 1962  MRN: [id number]  Today's Date: 2023      Visit Date: 2023   Attendance:   Subjective improvement: n/a  Recert: 23  MD Appointment: PRN      Visit Dx:    ICD-10-CM ICD-9-CM   1. Low back pain with sciatica, sciatica laterality unspecified, unspecified back pain laterality, unspecified chronicity  M54.40 724.3   2. Right lumbar radiculopathy  M54.16 724.4       There is no problem list on file for this patient.       No past medical history on file.     Past Surgical History:   Procedure Laterality Date   • COLONOSCOPY N/A 2019    Procedure: COLONOSCOPY;  Surgeon: Keny Cardona DO;  Location: [facility] ENDOSCOPY;  Service: Gastroenterology   • KNEE ARTHROSCOPY W/ MENISCECTOMY                          PT Assessment/Plan     Row Name 23 1500          PT Assessment    Functional Limitations Limitation in home management;Limitations in community activities;Limitations in functional capacity and performance;Performance in leisure activities;Performance in self-care ADL;Performance in work activities  -EM     Impairments Pain;Joint mobility  -EM     Assessment Comments Pt kirstin tx well with todays tx focused on treating piriformis via manual, stretching, therex. Pt did have slightly improved pain post tx. Pt ok with self pay for IFC. Pt was edu on a TENS unit for home use. Updated HEP issued with good understanding.  -EM     Rehab Potential Good  -EM     Patient/caregiver participated in establishment of treatment plan and goals Yes  -EM     Patient would benefit from skilled therapy intervention Yes  -EM        PT Plan    PT Frequency 2x/week;1x/week  -EM     Predicted Duration of Therapy Intervention (PT) 3-4 wks  -EM     PT Plan Comments Monitor pelvic alignment. Cont pirifromis S and manual as indicated. Pt to cont IFC via self pay.  -EM           User Key  (r) =  Recorded By, (t) = Taken By, (c) = Cosigned By    Initials Name Provider Type    EM Tonio Ortega, PTA Physical Therapist Assistant                 Modalities     Row Name 01/30/23 1500             Ice    Ice Applied Yes  -EM      Location L-Spine  -EM      Ice S/P Rx Yes  -EM         ELECTRICAL STIMULATION    Attended/Unattended Unattended  -EM      Stimulation Type IFC  w/ ice  -EM      Max mAmp 16  -EM      Location/Electrode Placement/Other L-Spine/ Piriformis  -EM      PT E-Stim Unattended Minutes --  -EM            User Key  (r) = Recorded By, (t) = Taken By, (c) = Cosigned By    Initials Name Provider Type    EM Tonio Ortega, PTA Physical Therapist Assistant               OP Exercises     Row Name 01/30/23 1500             Subjective Comments    Subjective Comments Pain is in R piriformis region with N/T down R LE into lateral foot. Shingles present R foot. Lidocane patch on R glute. Pain is worse at night time, especially when laying down.  -EM         Subjective Pain    Able to rate subjective pain? yes  -EM      Pre-Treatment Pain Level 3  -EM      Post-Treatment Pain Level 2  -EM         Exercise 1    Exercise Name 1 PRO II-4.0  -EM      Time 1 10'  -EM         Exercise 2    Exercise Name 2 Manual R piriformis  -EM      Time 2 12'  -EM         Exercise 3    Exercise Name 3 HLTR  -EM      Sets 3 1  -EM      Reps 3 20  -EM         Exercise 4    Exercise Name 4 R SL Clamshells  -EM      Sets 4 1  -EM      Reps 4 20  -EM         Exercise 5    Exercise Name 5 Bridges  -EM      Sets 5 1  -EM      Reps 5 20  -EM         Exercise 6    Exercise Name 6 IFC w/ Ice  -EM      Time 6 20'  -EM            User Key  (r) = Recorded By, (t) = Taken By, (c) = Cosigned By    Initials Name Provider Type    EM Tonio Ortega, PTA Physical Therapist Assistant                              PT OP Goals     Row Name 01/30/23 1700          PT Short Term Goals    STG Date to Achieve --  STGs deferred  -EM        Long Term Goals     LTG Date to Achieve 02/20/23  -EM     LTG 1 Independent with HEP/self-management.  -EM     LTG 1 Progress Not Met  -EM     LTG 2 Resume normal sleep patterns.  -EM     LTG 2 Progress Not Met  -EM     LTG 3 Modified Oswestry score </= 10/50.  -EM     LTG 3 Progress Not Met  -EM     LTG 4 Resume PLOF.  -EM     LTG 4 Progress Not Met  -EM        Time Calculation    PT Goal Re-Cert Due Date 02/20/23  -EM           User Key  (r) = Recorded By, (t) = Taken By, (c) = Cosigned By    Initials Name Provider Type    EM Tonio Ortega, JACOB Physical Therapist Assistant                Therapy Education  Education Details: Piriformis S, KEITH Rees, RASHAD, Derik, home tens unit, when cryotherapy and MHP is indicated.  Given: HEP  Program: Progressed  How Provided: Verbal, Demonstration, Written  Provided to: Patient  Level of Understanding: Verbalized, Demonstrated              Time Calculation:   Start Time: 1434  Stop Time: 1545  Time Calculation (min): 71 min  Total Timed Code Minutes- PT: 71 minute(s)  Therapy Charges for Today     Code Description Service Date Service Provider Modifiers Qty    08689133680 HC PT THER PROC EA 15 MIN 1/30/2023 Tonio Ortega, PTA GP, CQ 3    19044101221 HC PT MANUAL THERAPY EA 15 MIN 1/30/2023 Tonio Ortega PTA GP, CQ 1    85148530093 HC PT ELECTRICAL STIM UNATTENDED 1/30/2023 Tonio Ortega PTA CQ 1                    Tonio Ortega PTA  1/30/2023      No

## 2023-02-06 ENCOUNTER — APPOINTMENT (OUTPATIENT)
Dept: ORTHOPEDIC SURGERY | Facility: CLINIC | Age: 63
End: 2023-02-06
Payer: MEDICARE

## 2023-02-06 VITALS — HEIGHT: 66 IN | WEIGHT: 174 LBS | BODY MASS INDEX: 27.97 KG/M2

## 2023-02-06 DIAGNOSIS — M54.16 RADICULOPATHY, LUMBAR REGION: ICD-10-CM

## 2023-02-06 PROCEDURE — J3490M: CUSTOM

## 2023-02-06 PROCEDURE — 20610 DRAIN/INJ JOINT/BURSA W/O US: CPT | Mod: 50

## 2023-02-06 PROCEDURE — 99214 OFFICE O/P EST MOD 30 MIN: CPT | Mod: 25

## 2023-02-06 PROCEDURE — 72170 X-RAY EXAM OF PELVIS: CPT

## 2023-02-06 PROCEDURE — 72100 X-RAY EXAM L-S SPINE 2/3 VWS: CPT

## 2023-02-06 NOTE — HISTORY OF PRESENT ILLNESS
[2] : 2 [0] : 0 [Sharp] : sharp [Intermittent] : intermittent [Household chores] : household chores [Leisure] : leisure [Nothing helps with pain getting better] : Nothing helps with pain getting better [Standing] : standing [Walking] : walking [de-identified] : 02/06/23: Pt is here to follow up on right hip. Had RT MEGAN on 06/30/22, and a site infection on 07/13/22. Will experience intermittent pain in pelvis, otherwise pain is no longer present. Notes a pop to lspine with resting. Takes oxycodone, and celebrex. Complaining of right shoulder pain, has been doing HEP with minimal relief, known RTC tear [] : no

## 2023-02-06 NOTE — ASSESSMENT
[FreeTextEntry1] : 62F 7mo s/p R MEGAN, R shoulder RTC tear, lumbar DDD\par \par R shoulder and knee CSI tolerated well\par Continue HEP for hip\par visco auth for right knee\par return 5 mo\par \par We discussed the patient's progress. The patient was reminded of their hip dislocation precautions and their antibiotic prophylaxis. We discussed continued physical therapy and/or a home exercise program. Questions about their hip replacement and future follow up were answered and discussed.\par

## 2023-02-06 NOTE — PROCEDURE
[Knee] : knee [Large Joint Injection] : Large joint injection [Right] : of the right [Shoulder] : shoulder [Pain] : pain [Inflammation] : inflammation [X-ray evidence of Osteoarthritis on this or prior visit] : x-ray evidence of Osteoarthritis on this or prior visit [Alcohol] : alcohol [Betadine] : betadine [Ethyl Chloride sprayed topically] : ethyl chloride sprayed topically [Sterile technique used] : sterile technique used [___ cc    0.5%] : Bupivacaine (Marcaine) ~Vcc of 0.5%  [___ cc    40mg] : Triamcinolone (Kenalog) ~Vcc of 40 mg  [Call if redness, pain or fever occur] : call if redness, pain or fever occur [Apply ice for 15min out of every hour for the next 12-24 hours as tolerated] : apply ice for 15 minutes out of every hour for the next 12-24 hours as tolerated [Previous OTC use and PT nontherapeutic] : patient has tried OTC's including aspirin, Ibuprofen, Aleve, etc or prescription NSAIDS, and/or exercises at home and/or physical therapy without satisfactory response [Patient had decreased mobility in the joint] : patient had decreased mobility in the joint [Risks, benefits, alternatives discussed / Verbal consent obtained] : the risks benefits, and alternatives have been discussed, and verbal consent was obtained

## 2023-02-06 NOTE — PHYSICAL EXAM
[Normal Sensation] : normal sensation [Normal Mood and Affect] : normal mood and affect [Orientated] : orientated [Facet arthropathy] : Facet arthropathy [Disc space narrowing] : Disc space narrowing [Spondylolithesis] : Spondylolithesis [4 ___] : forward flexion 4[unfilled]/5 [4___] : internal rotation 4[unfilled]/5 [Right] : right hip [All Views] : anteroposterior, lateral [No loss of surgical correlation. Bony alignment acceptable. Hardware in appropriate position] : No loss of surgical correlation. Bony alignment acceptable. Hardware in appropriate position [] : no pain with flexion and external rotation

## 2023-02-07 RX ORDER — HYALURONATE SODIUM 30 MG/2 ML
30 SYRINGE (ML) INTRAARTICULAR
Qty: 4 | Refills: 0 | Status: ACTIVE | COMMUNITY
Start: 2023-02-07 | End: 1900-01-01

## 2023-02-14 NOTE — ASU PREOP CHECKLIST - BMI (KG/M2)
The patient has been examined and the H&P has been reviewed:    I concur with the findings and no changes have occurred since H&P was written.    Procedure risks, benefits and alternative options discussed and understood by patient/family.    Patient is not on any anticoagulation.    There are no hospital problems to display for this patient.     26.2

## 2023-03-03 ENCOUNTER — APPOINTMENT (OUTPATIENT)
Dept: ORTHOPEDIC SURGERY | Facility: CLINIC | Age: 63
End: 2023-03-03
Payer: MEDICARE

## 2023-03-03 VITALS — WEIGHT: 174 LBS | HEIGHT: 66 IN | BODY MASS INDEX: 27.97 KG/M2

## 2023-03-03 PROCEDURE — 20610 DRAIN/INJ JOINT/BURSA W/O US: CPT | Mod: RT

## 2023-03-03 PROCEDURE — 99214 OFFICE O/P EST MOD 30 MIN: CPT | Mod: 25

## 2023-03-03 NOTE — HISTORY OF PRESENT ILLNESS
[Gradual] : gradual [7] : 7 [0] : 0 [Dull/Aching] : dull/aching [Constant] : constant [Ice] : ice [Walking] : walking [Disabled] : Work status: disabled [de-identified] : 02/06/23: Pt is here to follow up on right hip. Had RT MEGAN on 06/30/22, and a site infection on 07/13/22. Will experience intermittent pain in pelvis, otherwise pain is no longer present. Notes a pop to lspine with resting. Takes oxycodone, and celebrex. Complaining of right shoulder pain, has been doing HEP with minimal relief, known RTC tear [] : no [de-identified] : 2/6/23 [de-identified] : Diego BENNETT

## 2023-03-03 NOTE — ASSESSMENT
[FreeTextEntry1] : 62F 8mo s/p R MEGAN, R shoulder RTC tear, lumbar DDD, R knee OA\par \par Continue HEP for hip\par orthovisc 1 tolerated well\par return 1week\par \par The risks, benefits, contents and alternatives to injection were explained in full to the patient. Risks outlined include but are not limited to infection, sepsis, bleeding, scarring, skin discoloration, temporary increase in pain, syncopal episode, failure to resolve symptoms, allergic reaction, flare reaction, permanent white skin discoloration, symptom recurrence, and elevation of blood sugar in diabetics. Patient understood the risks. All questions were answered. After discussion of options, patient requested an injection. Oral informed consent was obtained and sterile prep was done of the injection site. Sterile technique was used to introduce the mixture. Patient tolerated the procedure well. Patient advised to ice the injection site this evening. Signs and symptoms of infection reviewed and patient advised to call immediately for redness, fevers, and/or chills.\par  \par

## 2023-03-03 NOTE — PROCEDURE
[Knee] : knee [Orthovisc (30mg)] : 30mg of Orthovisc [#1] : series #1 [Large Joint Injection] : Large joint injection [Right] : of the right [Shoulder] : shoulder [Pain] : pain [Inflammation] : inflammation [X-ray evidence of Osteoarthritis on this or prior visit] : x-ray evidence of Osteoarthritis on this or prior visit [Alcohol] : alcohol [Betadine] : betadine [Ethyl Chloride sprayed topically] : ethyl chloride sprayed topically [Sterile technique used] : sterile technique used [___ cc    0.5%] : Bupivacaine (Marcaine) ~Vcc of 0.5%  [___ cc    40mg] : Triamcinolone (Kenalog) ~Vcc of 40 mg  [Call if redness, pain or fever occur] : call if redness, pain or fever occur [Apply ice for 15min out of every hour for the next 12-24 hours as tolerated] : apply ice for 15 minutes out of every hour for the next 12-24 hours as tolerated [Previous OTC use and PT nontherapeutic] : patient has tried OTC's including aspirin, Ibuprofen, Aleve, etc or prescription NSAIDS, and/or exercises at home and/or physical therapy without satisfactory response [Patient had decreased mobility in the joint] : patient had decreased mobility in the joint [Risks, benefits, alternatives discussed / Verbal consent obtained] : the risks benefits, and alternatives have been discussed, and verbal consent was obtained

## 2023-03-03 NOTE — REASON FOR VISIT
[FreeTextEntry2] : Patient returns today for the first injection in the series orthovisc to her right knee

## 2023-03-06 NOTE — H&P PST ADULT - FUNCTIONAL ASSESSMENT - DAILY ACTIVITY ASSESSMENT TYPE
"Jennifer Cervantes is a 25 year old female patient that presents today in clinic for the following:    Chief Complaint   Patient presents with     Gyn Exam     Pt here for pelvic exam and pap smear.     The patient's allergies and medications were reviewed as noted. A set of vitals were recorded as noted without incident: /82 (BP Location: Right arm, Patient Position: Sitting, Cuff Size: Adult Regular)   Pulse 87   Temp 98.2  F (36.8  C)   Resp 20   Ht 1.626 m (5' 4.02\")   Wt 69.1 kg (152 lb 4.8 oz)   LMP 12/01/2019 (Approximate)   SpO2 94%   BMI 26.13 kg/m  . The patient does not have any other questions for the provider.    Joy Herndon, EMT at 11:07 AM on 3/6/2023  " Admission

## 2023-03-09 ENCOUNTER — APPOINTMENT (OUTPATIENT)
Dept: ORTHOPEDIC SURGERY | Facility: CLINIC | Age: 63
End: 2023-03-09
Payer: MEDICARE

## 2023-03-09 VITALS — WEIGHT: 174 LBS | HEIGHT: 66 IN | BODY MASS INDEX: 27.97 KG/M2

## 2023-03-09 DIAGNOSIS — M16.11 UNILATERAL PRIMARY OSTEOARTHRITIS, RIGHT HIP: ICD-10-CM

## 2023-03-09 PROCEDURE — 99024 POSTOP FOLLOW-UP VISIT: CPT

## 2023-03-09 PROCEDURE — 20611 DRAIN/INJ JOINT/BURSA W/US: CPT | Mod: RT

## 2023-03-09 NOTE — PROCEDURE
[Knee] : knee [Orthovisc (30mg)] : 30mg of Orthovisc [#2] : series #2 [Prior failure or difficult injection] : prior failure or difficult injection [Altered anatomic landmarks d/t erosive arthritis] : altered anatomic landmarks d/t erosive arthritis [All ultrasound images have been permanently captured and stored accordingly in our picture archiving and communication system] : All ultrasound images have been permanently captured and stored accordingly in our picture archiving and communication system [Large Joint Injection] : Large joint injection [Right] : of the right [Shoulder] : shoulder [Pain] : pain [Inflammation] : inflammation [X-ray evidence of Osteoarthritis on this or prior visit] : x-ray evidence of Osteoarthritis on this or prior visit [Alcohol] : alcohol [Betadine] : betadine [Ethyl Chloride sprayed topically] : ethyl chloride sprayed topically [Sterile technique used] : sterile technique used [___ cc    0.5%] : Bupivacaine (Marcaine) ~Vcc of 0.5%  [___ cc    40mg] : Triamcinolone (Kenalog) ~Vcc of 40 mg  [Call if redness, pain or fever occur] : call if redness, pain or fever occur [Apply ice for 15min out of every hour for the next 12-24 hours as tolerated] : apply ice for 15 minutes out of every hour for the next 12-24 hours as tolerated [Previous OTC use and PT nontherapeutic] : patient has tried OTC's including aspirin, Ibuprofen, Aleve, etc or prescription NSAIDS, and/or exercises at home and/or physical therapy without satisfactory response [Patient had decreased mobility in the joint] : patient had decreased mobility in the joint [Risks, benefits, alternatives discussed / Verbal consent obtained] : the risks benefits, and alternatives have been discussed, and verbal consent was obtained

## 2023-03-09 NOTE — HISTORY OF PRESENT ILLNESS
[2] : 2 [Orthovisc] : Orthovisc [de-identified] : Here for f/u right knee for orthovisc injection.  [] : no [de-identified] : 3/3/23 [de-identified] : right knee  [TWNoteComboBox1] : 20%

## 2023-03-09 NOTE — PHYSICAL EXAM
[Normal Sensation] : normal sensation [Normal Mood and Affect] : normal mood and affect [Orientated] : orientated

## 2023-03-09 NOTE — DISCUSSION/SUMMARY
[de-identified] : Case Discussed.\par Tolerated injection well. \par f/u 1 week with Dr. Cortez to continue series. \par \par \par Entered by Elsie GREY acting as a scribe.\par Instructions: Dr. Torrez- The documentation recorded by the scribe accurately reflects the service I personally performed and the decisions made by me.\par

## 2023-03-09 NOTE — ASU PREOP CHECKLIST - SPO2 (%)
CHERY    Caller: Joshua Becerra     Medication Name and Dosage:  clopidogrel (PLAVIX) 75 MG Tab [[id number]]    Medication amount left: 3 DAYS    Preferred Pharmacy: [facility] on [address]    Other questions (Topic): Please see encounter on 03/03/2023- Pt states they have not heard back on his refills    Callback Number (Will only call   for issues): [phone] (home)       Thank you    Ml LEMUS  
Pts Rx was filled at [facility] but no pharmacy receipt is noted. Please advise.   
To MA pool, please prep electronic prescription as paper rx was printed and update pharmacy as requested below in initial call, thank you!          
96

## 2023-03-15 NOTE — OCCUPATIONAL THERAPY INITIAL EVALUATION ADULT - ADDITIONAL COMMENTS
Pt lives with friend (Who is around post op to assist) in a private house with 5 steps to enter with bilateral handrails that are far apart. Once inside, the pt main bedroom and bathroom is on that floor. The pts bathroom has a tub/shower combination, fixed/retractable shower head, standard toilet seat and 1x grab bar. The pt reports that a 3/1 commode might fit over the toilet at home. OT educated pt on raised toilet seat with arms as an option. The pt ambulates with no device and owns a rolling walker. The pt daily pain is a 5/10 at rest and a 10/10 with movement. The pt manages the pain with rest and oxycodone 30mg 4x a day. The pt is still going to outpatient PT 2x a week, no recent falls and has buckling of the knees daily. The pt wears glasses for reading, R handed, drives and has no hearing impairments. 6

## 2023-03-17 ENCOUNTER — APPOINTMENT (OUTPATIENT)
Dept: ORTHOPEDIC SURGERY | Facility: CLINIC | Age: 63
End: 2023-03-17
Payer: MEDICARE

## 2023-03-17 VITALS — HEIGHT: 66 IN | WEIGHT: 174 LBS | BODY MASS INDEX: 27.97 KG/M2

## 2023-03-17 PROCEDURE — 20611 DRAIN/INJ JOINT/BURSA W/US: CPT | Mod: RT

## 2023-03-17 PROCEDURE — 99024 POSTOP FOLLOW-UP VISIT: CPT

## 2023-03-17 NOTE — PROCEDURE
[Knee] : knee [Orthovisc (30mg)] : 30mg of Orthovisc [#3] : series #3 [Prior failure or difficult injection] : prior failure or difficult injection [Altered anatomic landmarks d/t erosive arthritis] : altered anatomic landmarks d/t erosive arthritis [All ultrasound images have been permanently captured and stored accordingly in our picture archiving and communication system] : All ultrasound images have been permanently captured and stored accordingly in our picture archiving and communication system [Large Joint Injection] : Large joint injection [Right] : of the right [Shoulder] : shoulder [Pain] : pain [Inflammation] : inflammation [X-ray evidence of Osteoarthritis on this or prior visit] : x-ray evidence of Osteoarthritis on this or prior visit [Alcohol] : alcohol [Betadine] : betadine [Ethyl Chloride sprayed topically] : ethyl chloride sprayed topically [Sterile technique used] : sterile technique used [___ cc    0.5%] : Bupivacaine (Marcaine) ~Vcc of 0.5%  [___ cc    40mg] : Triamcinolone (Kenalog) ~Vcc of 40 mg  [Call if redness, pain or fever occur] : call if redness, pain or fever occur [Apply ice for 15min out of every hour for the next 12-24 hours as tolerated] : apply ice for 15 minutes out of every hour for the next 12-24 hours as tolerated [Previous OTC use and PT nontherapeutic] : patient has tried OTC's including aspirin, Ibuprofen, Aleve, etc or prescription NSAIDS, and/or exercises at home and/or physical therapy without satisfactory response [Patient had decreased mobility in the joint] : patient had decreased mobility in the joint [Risks, benefits, alternatives discussed / Verbal consent obtained] : the risks benefits, and alternatives have been discussed, and verbal consent was obtained

## 2023-03-17 NOTE — HISTORY OF PRESENT ILLNESS
[3] : 3 [Orthovisc] : Orthovisc [] : no [de-identified] : 3/9/23 [de-identified] : right knee [TWNoteComboBox1] : 20%

## 2023-03-17 NOTE — PHYSICAL EXAM
[Normal Sensation] : normal sensation [Normal Mood and Affect] : normal mood and affect [Orientated] : orientated [Right] : right knee [] : no erythema

## 2023-03-17 NOTE — DISCUSSION/SUMMARY
[de-identified] : General Dx Discussion\par The patient was advised of the diagnosis. The natural history of the pathology was explained in full to the patient in layman's terms. All questions were answered. The risks and benefits of surgical and non-surgical treatment alternatives were explained in full to the patient.\par \par

## 2023-03-31 ENCOUNTER — APPOINTMENT (OUTPATIENT)
Dept: ORTHOPEDIC SURGERY | Facility: CLINIC | Age: 63
End: 2023-03-31
Payer: MEDICARE

## 2023-03-31 VITALS — BODY MASS INDEX: 27.97 KG/M2 | HEIGHT: 66 IN | WEIGHT: 174 LBS

## 2023-03-31 DIAGNOSIS — M17.11 UNILATERAL PRIMARY OSTEOARTHRITIS, RIGHT KNEE: ICD-10-CM

## 2023-03-31 PROCEDURE — 20610 DRAIN/INJ JOINT/BURSA W/O US: CPT | Mod: RT

## 2023-03-31 PROCEDURE — 99214 OFFICE O/P EST MOD 30 MIN: CPT | Mod: 25

## 2023-03-31 NOTE — ASSESSMENT
[FreeTextEntry1] : 62F 8mo s/p R MEGAN, R shoulder RTC tear, lumbar DDD, R knee OA\par \par Continue HEP for hip\par orthovisc 4 tolerated well\par return 6week\par \par The risks, benefits, contents and alternatives to injection were explained in full to the patient. Risks outlined include but are not limited to infection, sepsis, bleeding, scarring, skin discoloration, temporary increase in pain, syncopal episode, failure to resolve symptoms, allergic reaction, flare reaction, permanent white skin discoloration, symptom recurrence, and elevation of blood sugar in diabetics. Patient understood the risks. All questions were answered. After discussion of options, patient requested an injection. Oral informed consent was obtained and sterile prep was done of the injection site. Sterile technique was used to introduce the mixture. Patient tolerated the procedure well. Patient advised to ice the injection site this evening. Signs and symptoms of infection reviewed and patient advised to call immediately for redness, fevers, and/or chills.\par  \par

## 2023-03-31 NOTE — HISTORY OF PRESENT ILLNESS
[5] : 5 [3] : 3 [Dull/Aching] : dull/aching [Sharp] : sharp [4] : 4 [Orthovisc] : Orthovisc [de-identified] : 02/06/23: Pt is here to follow up on right hip. Had RT MEGAN on 06/30/22, and a site infection on 07/13/22. Will experience intermittent pain in pelvis, otherwise pain is no longer present. Notes a pop to lspine with resting. Takes oxycodone, and celebrex. Complaining of right shoulder pain, has been doing HEP with minimal relief, known RTC tear [] : Post Surgical Visit: no [FreeTextEntry1] : right knee  [de-identified] : none  [de-identified] : 3/17/23 [de-identified] : right knee  [TWNoteComboBox1] : 30%

## 2023-03-31 NOTE — PROCEDURE
[Knee] : knee [Orthovisc (30mg)] : 30mg of Orthovisc [#4] : series #4 [Large Joint Injection] : Large joint injection [Right] : of the right [Shoulder] : shoulder [Pain] : pain [Inflammation] : inflammation [X-ray evidence of Osteoarthritis on this or prior visit] : x-ray evidence of Osteoarthritis on this or prior visit [Alcohol] : alcohol [Betadine] : betadine [Ethyl Chloride sprayed topically] : ethyl chloride sprayed topically [Sterile technique used] : sterile technique used [___ cc    0.5%] : Bupivacaine (Marcaine) ~Vcc of 0.5%  [___ cc    40mg] : Triamcinolone (Kenalog) ~Vcc of 40 mg  [Call if redness, pain or fever occur] : call if redness, pain or fever occur [Apply ice for 15min out of every hour for the next 12-24 hours as tolerated] : apply ice for 15 minutes out of every hour for the next 12-24 hours as tolerated [Previous OTC use and PT nontherapeutic] : patient has tried OTC's including aspirin, Ibuprofen, Aleve, etc or prescription NSAIDS, and/or exercises at home and/or physical therapy without satisfactory response [Patient had decreased mobility in the joint] : patient had decreased mobility in the joint [Risks, benefits, alternatives discussed / Verbal consent obtained] : the risks benefits, and alternatives have been discussed, and verbal consent was obtained

## 2023-04-19 NOTE — BH CONSULTATION LIAISON ASSESSMENT NOTE - NSHPLANGLIMITEDENGLISH_GEN_A_CORE
----- Message from Adriana Montero MD sent at 4/14/2023  9:59 AM CDT -----  Please call patient regarding lab results. Vitamin B12 level is low. Recommend vitamin B12 injections once every 2 weeks X 2 months. Then, once monthly X 6 months. She will likely want to give these to herself at home. Vitamin D is low. Recommend ergocalciferol 50,000 units weekly X 12 weeks. Then transition to OTC vitamin D supplementation 2,000-5,000 units daily.   HbA1C is up slightly at 7. Low carb diet and exercise. Continue current medications. Cholesterol is up some. Low cholesterol diet. Exercise. Continue medication. Labs, otherwise, ok/stable. Thanks!  
Notified pt of labs and recommendations and she does want to have family member give her B12 shots at home. Rx pended to Dr. Gonzales.   
No

## 2023-05-22 NOTE — PROGRESS NOTE ADULT - SUBJECTIVE AND OBJECTIVE BOX
afebrile  REVIEW OF SYSTEMS:  GEN: no fever,    no chills  RESP: no SOB,   no cough  CVS: no chest pain,   no palpitations  GI: no abdominal pain,   no nausea,   no vomiting,   no constipation,   no diarrhea  : no dysuria,   no frequency  NEURO: no headache,   no dizziness  PSYCH: no depression,   not anxious  Derm : no rash    MEDICATIONS  (STANDING):  aspirin  chewable 81 milliGRAM(s) Oral daily  atorvastatin 40 milliGRAM(s) Oral at bedtime  dextrose 40% Gel 15 Gram(s) Oral once  dextrose 50% Injectable 25 Gram(s) IV Push once  dextrose 50% Injectable 12.5 Gram(s) IV Push once  dextrose 50% Injectable 25 Gram(s) IV Push once  enoxaparin Injectable 40 milliGRAM(s) SubCutaneous daily  glucagon  Injectable 1 milliGRAM(s) IntraMuscular once  levothyroxine 75 MICROGram(s) Oral daily  pantoprazole    Tablet 40 milliGRAM(s) Oral before breakfast    MEDICATIONS  (PRN):  melatonin 3 milliGRAM(s) Oral at bedtime PRN Insomnia  ondansetron Injectable 4 milliGRAM(s) IV Push every 8 hours PRN Nausea and/or Vomiting  oxyCODONE    IR 30 milliGRAM(s) Oral every 6 hours PRN Severe Pain (7 - 10)      Vital Signs Last 24 Hrs  T(C): 37 (08 Dec 2021 05:15), Max: 37.7 (07 Dec 2021 23:40)  T(F): 98.6 (08 Dec 2021 05:15), Max: 99.9 (07 Dec 2021 23:40)  HR: 54 (08 Dec 2021 05:15) (54 - 96)  BP: 104/69 (08 Dec 2021 05:15) (104/69 - 153/69)  BP(mean): --  RR: 18 (08 Dec 2021 05:15) (18 - 22)  SpO2: 96% (08 Dec 2021 05:15) (94% - 98%)  CAPILLARY BLOOD GLUCOSE      POCT Blood Glucose.: 88 mg/dL (07 Dec 2021 13:21)    I&O's Summary    07 Dec 2021 07:01  -  08 Dec 2021 07:00  --------------------------------------------------------  IN: 980 mL / OUT: 0 mL / NET: 980 mL        PHYSICAL EXAM:  HEAD:  Atraumatic, Normocephalic  NECK: Supple, No   JVD  CHEST/LUNG:   no     rales,     no,    rhonchi  HEART: Regular rate and rhythm;         murmur  ABDOMEN: Soft, Nontender, ;   EXTREMITIES:   no     edema  NEUROLOGY:  alert    LABS:                        12.8   6.55  )-----------( 192      ( 08 Dec 2021 07:13 )             38.5     12-08    141  |  107  |  17  ----------------------------<  93  3.4<L>   |  27  |  0.77    Ca    8.4<L>      08 Dec 2021 07:13  Phos  3.9     12-08  Mg     2.1     12-08    TPro  6.6  /  Alb  3.3  /  TBili  0.6  /  DBili  x   /  AST  22  /  ALT  17  /  AlkPhos  63  12-08    PT/INR - ( 07 Dec 2021 06:27 )   PT: 12.4 sec;   INR: 1.07 ratio         PTT - ( 07 Dec 2021 06:27 )  PTT:44.5 sec        Lactate, Blood: 1.2 mmol/L (12-07 @ 06:27)          Thyroid Stimulating Hormone, Serum: 8.390 uU/mL (12-07 @ 12:41)          Consultant(s) Notes Reviewed:      Care Discussed with Consultants/Other Providers:     Opt out

## 2023-06-23 NOTE — OCCUPATIONAL THERAPY INITIAL EVALUATION ADULT - PHYSICAL ASSIST/NONPHYSICAL ASSIST:TOILET, OT EVAL
Health Maintenance Due   Topic Date Due   • Pneumococcal Vaccine 0-64 (1 - PCV) Never done   • COVID-19 Vaccine (2 - Nathan risk series) 09/02/2021   • TDaP Pregnancy Vaccine  04/17/2023     Patient is due for topics as listed above but is not proceeding with Immunization(s) COVID-19, Dtap/Tdap/Td and Pneumococcal at this time.    verbal cues

## 2023-08-10 ENCOUNTER — NON-APPOINTMENT (OUTPATIENT)
Age: 63
End: 2023-08-10

## 2023-09-13 ENCOUNTER — APPOINTMENT (OUTPATIENT)
Dept: ORTHOPEDIC SURGERY | Facility: CLINIC | Age: 63
End: 2023-09-13

## 2023-09-14 DIAGNOSIS — K83.8 OTHER SPECIFIED DISEASES OF BILIARY TRACT: ICD-10-CM

## 2023-10-12 NOTE — ED PROVIDER NOTE - MEDICAL DECISION MAKING DETAILS
CT scan demonstrates no acute pathology. pt appears well and non-toxic. . VSS. Sx have improved during ED stay. No acute events during ED observation period. Precautions given to return to the ED if sx persist or change. Pt expresses understanding and has no further questions. Pt feels comfortable and wishes to be discharged home. Instructed to follow up with PMD in 24 hrs. None

## 2023-10-16 ENCOUNTER — APPOINTMENT (OUTPATIENT)
Dept: GASTROENTEROLOGY | Facility: HOSPITAL | Age: 63
End: 2023-10-16

## 2023-10-16 ENCOUNTER — TRANSCRIPTION ENCOUNTER (OUTPATIENT)
Age: 63
End: 2023-10-16

## 2023-10-16 ENCOUNTER — OUTPATIENT (OUTPATIENT)
Dept: OUTPATIENT SERVICES | Facility: HOSPITAL | Age: 63
LOS: 1 days | End: 2023-10-16
Payer: MEDICARE

## 2023-10-16 VITALS
HEIGHT: 64 IN | HEART RATE: 60 BPM | WEIGHT: 175.93 LBS | TEMPERATURE: 97 F | SYSTOLIC BLOOD PRESSURE: 147 MMHG | OXYGEN SATURATION: 100 % | DIASTOLIC BLOOD PRESSURE: 74 MMHG | RESPIRATION RATE: 16 BRPM

## 2023-10-16 VITALS
OXYGEN SATURATION: 98 % | SYSTOLIC BLOOD PRESSURE: 125 MMHG | HEART RATE: 74 BPM | DIASTOLIC BLOOD PRESSURE: 78 MMHG | RESPIRATION RATE: 22 BRPM

## 2023-10-16 DIAGNOSIS — K83.8 OTHER SPECIFIED DISEASES OF BILIARY TRACT: ICD-10-CM

## 2023-10-16 DIAGNOSIS — Z98.890 OTHER SPECIFIED POSTPROCEDURAL STATES: Chronic | ICD-10-CM

## 2023-10-16 DIAGNOSIS — Z86.79 PERSONAL HISTORY OF OTHER DISEASES OF THE CIRCULATORY SYSTEM: Chronic | ICD-10-CM

## 2023-10-16 PROCEDURE — 43259 EGD US EXAM DUODENUM/JEJUNUM: CPT

## 2023-10-16 RX ORDER — NALOXEGOL OXALATE 12.5 MG/1
1 TABLET, FILM COATED ORAL
Qty: 0 | Refills: 0 | DISCHARGE

## 2023-10-16 RX ORDER — TRAZODONE HCL 50 MG
75 TABLET ORAL
Qty: 0 | Refills: 0 | DISCHARGE

## 2023-10-16 RX ORDER — BACLOFEN 100 %
1 POWDER (GRAM) MISCELLANEOUS
Qty: 0 | Refills: 0 | DISCHARGE

## 2023-10-16 RX ORDER — LEVOTHYROXINE SODIUM 125 MCG
1 TABLET ORAL
Qty: 30 | Refills: 1

## 2023-10-16 RX ORDER — GABAPENTIN 400 MG/1
0 CAPSULE ORAL
Qty: 0 | Refills: 0 | DISCHARGE

## 2023-10-16 RX ORDER — MONTELUKAST 4 MG/1
2 TABLET, CHEWABLE ORAL
Qty: 0 | Refills: 0 | DISCHARGE

## 2023-10-16 RX ORDER — SODIUM CHLORIDE 9 MG/ML
500 INJECTION INTRAMUSCULAR; INTRAVENOUS; SUBCUTANEOUS
Refills: 0 | Status: DISCONTINUED | OUTPATIENT
Start: 2023-10-16 | End: 2023-10-30

## 2023-10-16 RX ORDER — OMEPRAZOLE 10 MG/1
1 CAPSULE, DELAYED RELEASE ORAL
Qty: 0 | Refills: 0 | DISCHARGE

## 2023-10-16 RX ORDER — SENNA PLUS 8.6 MG/1
2 TABLET ORAL
Qty: 0 | Refills: 0 | DISCHARGE

## 2023-10-16 RX ORDER — SUCRALFATE 1 G
1 TABLET ORAL
Refills: 0 | DISCHARGE

## 2023-10-16 RX ORDER — MAGNESIUM OXIDE 400 MG ORAL TABLET 241.3 MG
1 TABLET ORAL
Qty: 0 | Refills: 0 | DISCHARGE

## 2023-10-16 RX ORDER — DULOXETINE HYDROCHLORIDE 30 MG/1
1 CAPSULE, DELAYED RELEASE ORAL
Qty: 0 | Refills: 0 | DISCHARGE

## 2023-10-16 RX ORDER — L.ACIDOPH/B.ANIMALIS/B.LONGUM 15B CELL
1 CAPSULE ORAL
Qty: 0 | Refills: 0 | DISCHARGE

## 2023-10-16 NOTE — ASU PREOP CHECKLIST - HAIR REMOVAL
Pt would like to be enrolled into [facility] therapies that come into the home.  She is struggling with having to go to dialysis 3 days a week, then therapy on the other days. Come the weekend, she is exhausted and is often sleeping days away. Also, all the extra activity is adding to lower blood sugars.    Per OT, we need a new order entered.  Will ask pcp to enter new order on Monday.    Luis A   hair removal not indicated

## 2023-10-16 NOTE — PRE-ANESTHESIA EVALUATION ADULT - NSANTHPEFT_GEN_ALL_CORE
GENERAL: NAD  HEAD:  Atraumatic, Normocephalic  CHEST/LUNG: Clear to auscultation bilaterally  HEART: Normal S1/S2  PSYCH: AAOx3  NEUROLOGY: non-focal  SKIN: No obvious rashes or lesions..

## 2023-10-16 NOTE — ASU DISCHARGE PLAN (ADULT/PEDIATRIC) - NSDISCH_BIOPSY_ENDO_ALL_CORE_FT
If you had a biopsy, you should not take aspirin or aspirin like products for the next 10 days unless instructed to do so by your doctor. If you had a biopsy, check with your doctor before taking any blood thinners such as warfarin (Coumadin).
30-Dec-2020 04:58

## 2023-10-16 NOTE — ASU DISCHARGE PLAN (ADULT/PEDIATRIC) - NS MD DC FALL RISK RISK
For information on Fall & Injury Prevention, visit: [email or web address] OR  [email or web address] OR  https://www.cdc.gov/steadi/patient.html

## 2023-10-16 NOTE — PRE PROCEDURE NOTE - PRE PROCEDURE EVALUATION
Attending Physician:  Tani                          Procedure: EGD EUS    Indication for Procedure: dilated bile duct  ________________________________________________________  PAST MEDICAL & SURGICAL HISTORY:  Bipolar disorder      Hypothyroidism      Chronic radicular low back pain      Arthritis of right hip      Migraines      History of Castro's esophagus      Osteoarthritis  right hip,      Arthritis      COVID-19  fatigue, fever, never hospitalized      History of hysteroscopy  s/p uterine ablation      H/O subarachnoid hemorrhage  1989: yasergil clip: no MRI's      History of lumbar puncture  1989, 1990, 1991, 1992      H/O colonoscopy  2018      S/P endoscopy  2018        ALLERGIES:  No Known Allergies    HOME MEDICATIONS:  baclofen 20 mg oral tablet: 1 tab(s) orally 2 times a day  Cymbalta 60 mg oral delayed release capsule: 1 cap(s) orally once a day (at bedtime)  magnesium oxide 500 mg oral tablet: 1 tab(s) orally once a day  oxyCODONE 30 mg oral tablet: 1 tab(s) orally every 4 hours, As needed, pain 4-10  Probiotic Formula oral capsule: 1 cap(s) orally once a day  sucralfate 1 g oral tablet: 1 tab(s) orally once a day  traZODone: 75 milligram(s) orally once a day (at bedtime)    AICD/PPM: [ ] yes   [ ] no    PERTINENT LAB DATA:                      PHYSICAL EXAMINATION:    Height (cm): 162.6  Weight (kg): 79.8  BMI (kg/m2): 30.2  BSA (m2): 1.85T(C): 36.2  HR: 60  BP: 147/74  RR: 16  SpO2: 100%    Constitutional: NAD  HEENT: PERRLA, EOMI,    Neck:  No JVD  Respiratory: CTAB/L  Cardiovascular: S1 and S2  Gastrointestinal: BS+, soft, NT/ND  Extremities: No peripheral edema  Neurological: A/O x 3, no focal deficits  Psychiatric: Normal mood, normal affect  Skin: No rashes    ASA Class: I [ ]  II [ ]  III [ ]  IV [ ]    COMMENTS:    The patient is a suitable candidate for the planned procedure unless box checked [ ]  No, explain:

## 2023-10-16 NOTE — ASU PATIENT PROFILE, ADULT - FALL HARM RISK - UNIVERSAL INTERVENTIONS
Bed in lowest position, wheels locked, appropriate side rails in place/Call bell, personal items and telephone in reach/Instruct patient to call for assistance before getting out of bed or chair/Non-slip footwear when patient is out of bed/[place]ayer to call system/Physically safe environment - no spills, clutter or unnecessary equipment/Purposeful Proactive Rounding/Room/bathroom lighting operational, light cord in reach Will get labs, x ray was read as negative, no further workup needed Will get labs, x ray was read as negative, no further workup needed  wbc borderline elevated

## 2023-10-20 ENCOUNTER — NON-APPOINTMENT (OUTPATIENT)
Age: 63
End: 2023-10-20

## 2023-11-06 ENCOUNTER — APPOINTMENT (OUTPATIENT)
Dept: INTERNAL MEDICINE | Facility: CLINIC | Age: 63
End: 2023-11-06

## 2024-01-23 NOTE — PHYSICAL EXAM
[facility]  Acute    Subjective     Chaperone: accepts  Anish is accompanied by Mom = Leandra Javed     History and detail provided by: Patient, Parent, and previous records    No chief complaint on file.    HPI    Out of showed noticed red dots  Calluls on bottom of feet  Likes to have bare feet  No pain  Walking well  Got him new shoes  No pain  Walking blair Izaguirre, his rap name is Parish Pedraza  25 y.o. young man with Down syndrome  Lives with his family in 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 90 tablet 3    levothyroxine 112 MCG tablet Take 1 tablet by mouth daily. 30 minutes before breakfast 90 tablet 3    Cholecalciferol (vitamin D) 50 mcg (2,000 units) capsule Take 1 capsule by mouth daily. 60 capsule 11    fluticasone (FLONASE) 50 MCG/ACT nasal spray Spray 2 sprays in each nostril as needed.      Polyethylene Glycol 3350 (MIRALAX PO) Take by mouth daily.       No current facility-administered medications for this visit.       No past medical history on file.    Past Surgical History:   Procedure Laterality  Date    Adenoidectomy  03/2004    Tympanostomy  03/2004       Social History     Tobacco Use    Smoking status: Never    Smokeless tobacco: Never   Vaping Use    Vaping Use: never used   Substance Use Topics    Alcohol use: Never    Drug use: Never       Family History   Problem Relation Age of Onset    Hypertension Mother     Other Father         tree nut allergy       Objective   There were no vitals taken for this visit.  Physical Exam  Vitals and nursing note reviewed.   Constitutional:       Appearance: Normal appearance. He is well-developed.   HENT:      Head: Normocephalic and atraumatic.      Comments: Facies c/w Down syndrome     Right Ear: External ear normal.      Left Ear: External ear normal.      Nose: Nose normal.   Eyes:      General: Lids are normal. No scleral icterus.  Pulmonary:      Effort: Pulmonary effort is normal.   Feet:      Right foot:      Skin integrity: Callus and dry skin present.      Toenail Condition: Right toenails are normal.      Left foot:      Skin integrity: Callus and dry skin present.      Toenail Condition: Left toenails are normal.   Neurological:      General: No focal deficit present.      Mental Status: He is alert.      Motor: No tremor or seizure activity.      Gait: Gait is intact.      Comments: c/w intellectual disability   Psychiatric:         Mood and Affect: Mood normal.         Behavior: Behavior normal. Behavior is cooperative.      Comments: Communicative, very pleasant         Assessment   Problem List Items Addressed This Visit          Cardiac and Vasculature    Acrocyanosis (CMD)       Chromosomal and Congenital    Down syndrome       Skin    Tinea pedis of both feet - Primary    Relevant Medications    clotrimazole (LOTRIMIN) 1 % cream    Callus       Order as above  Call if concerns persist, otherwise will re-assess at Annual CPE    ACROCYANOSIS  That is the the purple/blue discoloration that we sometimes see of the feet and/or hands of some people  with Down syndrome. It is from slower circulation of smaller blood vessels. It is not painful. It is not harmful. Keeping the feet/hands warm and increasing movement may decrease the discoloration by increasing circulation.      Schedule follow up: Annual CPE 3/12/2024    Christi De La Cruz MD       For more information on health and wellness for individuals with Down syndrome, please see our links below.     Mental Wellness in Adults with Down Syndrome: A Guide to Emotional and Behavioral Strengths and Challenges (2nd Edition)   [email or web address]-wellness-in-adults-[email or web address]-down-syndrome-2nd-edition/     Resource Library: [facility]  Our online Resource Library has over 300 resources for people with Down syndrome, families & caregivers, and health care professionals. Resources include: health education videos featuring individuals with Down syndrome; visual supports; summaries of health conditions; recordings of presentations; links to recommended journal articles; and more!      E-mail Newsletter: [email or web address]  We send e-mails with updates from the Center, health and wellness tips, and information about upcoming events, classes, and programs.      Facebook: [email or web address]  We post information about upcoming events, links to health articles, updates about Down syndrome research and education, and details about opportunities in the community.    Discussed treatment options, plan with patient/caregiver and all questions answered. Patient/caregiver verbalized understanding and agreement with plan.   The patient/caregiver was instructed to call if any questions or problems about concerns addressed today.     [Normal Sensation] : normal sensation [Normal Mood and Affect] : normal mood and affect [Orientated] : orientated [Right] : right hip [] : no pain with flexion and external rotation [All Views] : anteroposterior, lateral [No loss of surgical correlation. Bony alignment acceptable. Hardware in appropriate position] : No loss of surgical correlation. Bony alignment acceptable. Hardware in appropriate position

## 2024-02-05 NOTE — BH CONSULTATION LIAISON PROGRESS NOTE - NSBHPROGSUIC_PSY_A_CORE
Immunization chart prep completed.  Immunization records verified.  Amina Nielsen due for Covid, Hep A, Hep B, Infanrix (Dtap), Influenza, IPV (polio), MMR, Pedvaxhib (hib), Pneumovax 23 (pneumococcal 23), and Varicella    
no

## 2024-04-19 ENCOUNTER — NON-APPOINTMENT (OUTPATIENT)
Age: 64
End: 2024-04-19

## 2024-05-21 NOTE — ED ADULT TRIAGE NOTE - WEIGHT IN LBS
Pt is overdue for a 1 year follow up with Dr. Earl. When you get a chance can you call and get her scheduled with either Dr. Earl or Lisa.    Thanks,  Tonia   [phone]

## 2024-07-09 ENCOUNTER — APPOINTMENT (OUTPATIENT)
Dept: PAIN MANAGEMENT | Facility: CLINIC | Age: 64
End: 2024-07-09
Payer: MEDICARE

## 2024-07-09 VITALS — WEIGHT: 155 LBS | BODY MASS INDEX: 26.46 KG/M2 | HEIGHT: 64 IN

## 2024-07-09 DIAGNOSIS — M54.2 CERVICALGIA: ICD-10-CM

## 2024-07-09 DIAGNOSIS — M54.12 RADICULOPATHY, CERVICAL REGION: ICD-10-CM

## 2024-07-09 DIAGNOSIS — M54.17 RADICULOPATHY, LUMBOSACRAL REGION: ICD-10-CM

## 2024-07-09 PROCEDURE — 20552 NJX 1/MLT TRIGGER POINT 1/2: CPT

## 2024-07-09 PROCEDURE — J3490M: CUSTOM

## 2024-07-09 PROCEDURE — 99204 OFFICE O/P NEW MOD 45 MIN: CPT | Mod: 25

## 2024-07-22 ENCOUNTER — APPOINTMENT (OUTPATIENT)
Dept: PAIN MANAGEMENT | Facility: CLINIC | Age: 64
End: 2024-07-22
Payer: MEDICARE

## 2024-07-22 VITALS — BODY MASS INDEX: 26.46 KG/M2 | HEIGHT: 64 IN | WEIGHT: 155 LBS

## 2024-07-22 DIAGNOSIS — M54.16 RADICULOPATHY, LUMBAR REGION: ICD-10-CM

## 2024-07-22 DIAGNOSIS — M51.26 OTHER INTERVERTEBRAL DISC DISPLACEMENT, LUMBAR REGION: ICD-10-CM

## 2024-07-22 PROCEDURE — 99214 OFFICE O/P EST MOD 30 MIN: CPT

## 2024-07-22 NOTE — HISTORY OF PRESENT ILLNESS
[Lower back] : lower back [6] : 6 [Dull/Aching] : dull/aching [Sharp] : sharp [Shooting] : shooting [Intermittent] : intermittent [Household chores] : household chores [Meds] : meds [Nothing helps with pain getting better] : Nothing helps with pain getting better [Standing] : standing [Walking] : walking [FreeTextEntry1] : 07/22/2024: Pt here to go over CT scan.   CT cervical spine 7/12/24 independently reviewed: C3-6 spondylosis and multilevel moderate/severe facet arthropathy and NF stenosis  CT Lumbar spine 7/12/24 independently reviewed:   Initial HPI 07/09/2024: Pain started a few months ago and is on the BILATERAL lower back R>L and radiates into the right buttock and down the right posterior thigh and lower legs to the toes described as a sharp shooting pain with associated numbness and tingling and spasms.   Also with b/l neck pain and radiates to the right shoulder described as a sharp pain.   CT Lumbar Spine: can't get MRI bc of clip in her head Conservative Care: heat/ice PRN has done PT in the past with no relief  Pain Medications: Oxycodone 30mg 4 times a day  Past Injections: ESIs many years ago  Spine surgery: none  Blood thinners: none [] : no [FreeTextEntry7] : RT LEG  [de-identified] : L MRI 2021

## 2024-07-22 NOTE — PHYSICAL EXAM
[de-identified] : Constitutional; Appears well, no apparent distress Ability to communicate: Normal  Respiratory: non-labored breathing Skin: No rash noted Head: Normocephalic, atraumatic Neck: no visible thyroid enlargement Eyes: Extraocular movements intact Neurologic: Alert and oriented x3 Psychiatric: normal mood, affect and behavior [] : non-antalgic

## 2024-07-22 NOTE — DISCUSSION/SUMMARY
[de-identified] : After discussing various treatment options with the patient including but not limited to oral medications, physical therapy, exercise modalities as well as interventional spinal injections, we have decided with the following plan:  - Continue Home exercises, stretching, activity modification, physical therapy, and conservative care. - MRI report and/or images was reviewed and discussed with the patient. - Recommend L4-5 Lumbar Epidural Steroid Injection under fluoroscopic guidance with image. (R>L) - The risks, benefits and alternatives of the proposed procedure were explained in detail with the patient. The risks outlined include but are not limited to infection, bleeding, post-dural puncture headache, nerve injury, a temporary increase in pain, failure to resolve symptoms, allergic reaction, symptom recurrence, and possible elevation of blood sugar in diabetics. All questions were answered to patient's apparent satisfaction and he/she verbalized an understanding. - Patient is presenting with acute/sub-acute radicular pain with impairment in ADLs and functionality.  The pain has not responded to conservative care including NSAID therapy and/or physical therapy.  There is no bleeding tendency, unstable medical condition, or systemic infection. - Follow up in 1-2 weeks post injection for re-evaluation.

## 2024-07-23 NOTE — H&P PST ADULT - MEDICATION ADMINISTRATION INFO, PROFILE
Presented with left lower extremity acute limb ischemia/extensive left iliofemoral and left infrainguinal embolism requiring left femoral thrombectomy and subsequent AKA on 6/7/24 with vascular surgery.  Incision C/D/I, pain controlled.   Vascular surgery saw here last on 7/10 and removed staples  Continue ASA and statin   Also on Xarelto due to LV thrombus but Xarelto not covered under pt's insurance. CM to investigate what is covered.  Rehab and pain control per PMR   no concerns

## 2024-08-13 ENCOUNTER — APPOINTMENT (OUTPATIENT)
Dept: PAIN MANAGEMENT | Facility: CLINIC | Age: 64
End: 2024-08-13
Payer: MEDICARE

## 2024-08-13 ENCOUNTER — APPOINTMENT (OUTPATIENT)
Dept: PAIN MANAGEMENT | Facility: CLINIC | Age: 64
End: 2024-08-13

## 2024-08-13 DIAGNOSIS — M54.16 RADICULOPATHY, LUMBAR REGION: ICD-10-CM

## 2024-08-13 PROCEDURE — 62323 NJX INTERLAMINAR LMBR/SAC: CPT

## 2024-08-13 NOTE — PROCEDURE
[FreeTextEntry3] : Date of Service: 08/13/2024   Account: [id number]  Patient: PILAR STAFFORD   YOB: 1960  Age: 64 year   Surgeon:                                                         Mac Zarate D.O.  Pre-Operative Diagnosis:                             Lumbosacral radiculitis  Post-Operative Diagnosis:                           Same  Procedure:                                                      Interlaminar lumbar epidural steroid injection (L4-5) under fluoroscopic guidance  Anesthesia:                                                     Local with MAC   This procedure was carried out using fluoroscopic guidance.  The risks and benefits of the procedure were discussed extensively with the patient.  The consent of the patient was obtained and the following procedure was performed.  The patient was placed in the prone position.  The lumbar area was prepped and draped in a sterile fashion.  A timeout was performed with all essential staff present and the site and side were verified. Under AP view with slight cephalad-caudad angulation, the L4-5 interspace was identified and marked.  Using sterile technique, the superficial skin was anesthetized with 1% Lidocaine without epinephrine.  A 20-gauge Tuohy needle was advanced into the epidural space under fluoroscopy using uxiox-muebwgmik-qzvyb technique and using loss of resistance at the L4-5 level.  After negative aspiration for heme or CSF, an epidurogram was obtained using 2-3 cc Omnipaque contrast injected under live fluoroscopy, confirming epidural placement of the needle.    Epidurogram showed no evidence of intrathecal or intravascular flow, and good evidence of bilateral epidural flow from L3-S2 levels.  After this, 4 cc of preservative free normal saline plus 12 mg of betamethasone were injected into the epidural space.  The needle was subsequently removed.  Anesthesia personnel were present throughout the procedure.  The patient tolerated the procedure well and was instructed to contact me immediately if there were any problems.  Mac Zarate D.O.

## 2024-09-09 ENCOUNTER — APPOINTMENT (OUTPATIENT)
Dept: PAIN MANAGEMENT | Facility: CLINIC | Age: 64
End: 2024-09-09
Payer: MEDICARE

## 2024-09-09 VITALS — HEIGHT: 64 IN | WEIGHT: 155 LBS | BODY MASS INDEX: 26.46 KG/M2

## 2024-09-09 DIAGNOSIS — M47.812 SPONDYLOSIS W/OUT MYELOPATHY OR RADICULOPATHY, CERVICAL REGION: ICD-10-CM

## 2024-09-09 DIAGNOSIS — M54.17 RADICULOPATHY, LUMBOSACRAL REGION: ICD-10-CM

## 2024-09-09 DIAGNOSIS — M54.2 CERVICALGIA: ICD-10-CM

## 2024-09-09 DIAGNOSIS — M51.26 OTHER INTERVERTEBRAL DISC DISPLACEMENT, LUMBAR REGION: ICD-10-CM

## 2024-09-09 PROCEDURE — 99214 OFFICE O/P EST MOD 30 MIN: CPT

## 2024-09-09 NOTE — DISCUSSION/SUMMARY
[de-identified] : After discussing various treatment options with the patient including but not limited to oral medications, physical therapy, exercise modalities as well as interventional spinal injections, we have decided with the following plan:  - Continue Home exercises, stretching, activity modification, physical therapy, and conservative care. - MRI report and/or images was reviewed and discussed with the patient. - Recommend First Diagnostic LEFT C3,4,5,6 Medial Branch Blocks under fluoroscopic guidance with image.  - Recommend First Diagnostic RIGHT C3,4,5,6 Medial Branch Blocks under fluoroscopic guidance with image.  - Patient presents with axial lumbar pain that has not responded to 3 months of conservative therapy including physical therapy or NSAID therapy.  The pain is interfering with activities of daily living and functionality. There is no radicular pain. The pain is exacerbated by facet loading. The patient has not had a vertebral fusion at the levels of the proposed treatment.  There is no unexplained neurologic deficit.  There is no history of systemic infection, unstable medical condition, bleeding tendency, or local infection.  The injection is being performed to diagnose the facet joint as the source of the individual's pain, in preparation for a radiofrequency ablation.  - The risks, benefits, contents and alternatives to injection were explained in full to the patient.  Risks outlined include but are not limited to infection, sepsis, bleeding, post-dural puncture headache, nerve damage, temporary increase in pain, syncopal episode, failure to resolve symptoms, allergic reaction, symptom recurrence, and elevation of blood sugar in diabetics. Cortisone may cause immunosuppression.  Patient understands the risks.  All questions were answered.  After discussion of options, patient requested an injection.  Information regarding the injection was given to the patient.  Which medications to stop prior to the injection was explained to the patient as well. - Follow up in 1-2 weeks post injection for re-evaluation.

## 2024-09-09 NOTE — HISTORY OF PRESENT ILLNESS
[Lower back] : lower back [6] : 6 [Dull/Aching] : dull/aching [Sharp] : sharp [Shooting] : shooting [Intermittent] : intermittent [Household chores] : household chores [Meds] : meds [Nothing helps with pain getting better] : Nothing helps with pain getting better [Standing] : standing [Walking] : walking [FreeTextEntry1] : 09/09/2024: s/p L4-5 LESI on 08/13/24 with >70% relief and improvement of ADLs. Having some pain now in the right buttock but no longer radiates down the leg to the toes.  Having pain on the left side of the neck radiating to the left shoulder described as a stiffness worse with turning head to the left.   07/22/2024: Pt here to go over CT scan.   CT cervical spine 7/12/24 independently reviewed: C3-6 spondylosis and multilevel moderate/severe facet arthropathy and NF stenosis  CT Lumbar spine 7/12/24 independently reviewed:   Initial HPI 07/09/2024: Pain started a few months ago and is on the BILATERAL lower back R>L and radiates into the right buttock and down the right posterior thigh and lower legs to the toes described as a sharp shooting pain with associated numbness and tingling and spasms.   Also with b/l neck pain and radiates to the right shoulder described as a sharp pain.   CT Lumbar Spine: can't get MRI bc of clip in her head Conservative Care: heat/ice PRN has done PT in the past with no relief  Pain Medications: Oxycodone 30mg 4 times a day  Past Injections: ESIs many years ago  Spine surgery: none  Blood thinners: none [] : no [FreeTextEntry7] : b/l legs  [de-identified] : L MRI 2021 [TWNoteComboBox1] : 50%

## 2024-09-09 NOTE — PHYSICAL EXAM
[de-identified] : Constitutional; Appears well, no apparent distress Ability to communicate: Normal  Respiratory: non-labored breathing Skin: No rash noted Head: Normocephalic, atraumatic Neck: no visible thyroid enlargement Eyes: Extraocular movements intact Neurologic: Alert and oriented x3 Psychiatric: normal mood, affect and behavior [] : negative sitting straight leg raise

## 2024-09-20 NOTE — PATIENT PROFILE ADULT - HAS THE PATIENT RECEIVED THE INFLUENZA VACCINE THIS SEASON?

## 2024-10-02 ENCOUNTER — APPOINTMENT (OUTPATIENT)
Age: 64
End: 2024-10-02
Payer: MEDICARE

## 2024-10-02 PROCEDURE — 64490 INJ PARAVERT F JNT C/T 1 LEV: CPT | Mod: LT

## 2024-10-02 PROCEDURE — 64492 INJ PARAVERT F JNT C/T 3 LEV: CPT | Mod: 59,LT

## 2024-10-02 PROCEDURE — 64491 INJ PARAVERT F JNT C/T 2 LEV: CPT | Mod: 59,LT

## 2024-10-09 ENCOUNTER — APPOINTMENT (OUTPATIENT)
Age: 64
End: 2024-10-09
Payer: MEDICARE

## 2024-10-09 PROCEDURE — 64490 INJ PARAVERT F JNT C/T 1 LEV: CPT | Mod: RT

## 2024-10-09 PROCEDURE — 64492 INJ PARAVERT F JNT C/T 3 LEV: CPT | Mod: 59,RT

## 2024-10-09 PROCEDURE — 64491 INJ PARAVERT F JNT C/T 2 LEV: CPT | Mod: 59,RT

## 2024-10-14 ENCOUNTER — APPOINTMENT (OUTPATIENT)
Dept: PAIN MANAGEMENT | Facility: CLINIC | Age: 64
End: 2024-10-14
Payer: MEDICARE

## 2024-10-14 VITALS — WEIGHT: 156 LBS | BODY MASS INDEX: 26.63 KG/M2 | HEIGHT: 64 IN

## 2024-10-14 DIAGNOSIS — M47.812 SPONDYLOSIS W/OUT MYELOPATHY OR RADICULOPATHY, CERVICAL REGION: ICD-10-CM

## 2024-10-14 DIAGNOSIS — M54.2 CERVICALGIA: ICD-10-CM

## 2024-10-14 DIAGNOSIS — M47.816 SPONDYLOSIS W/OUT MYELOPATHY OR RADICULOPATHY, LUMBAR REGION: ICD-10-CM

## 2024-10-14 PROCEDURE — 99214 OFFICE O/P EST MOD 30 MIN: CPT

## 2024-11-13 ENCOUNTER — APPOINTMENT (OUTPATIENT)
Age: 64
End: 2024-11-13
Payer: MEDICARE

## 2024-11-13 PROCEDURE — 64493 INJ PARAVERT F JNT L/S 1 LEV: CPT | Mod: 59,RT

## 2024-11-13 PROCEDURE — 64494 INJ PARAVERT F JNT L/S 2 LEV: CPT | Mod: 59,RT

## 2024-11-25 ENCOUNTER — APPOINTMENT (OUTPATIENT)
Dept: PAIN MANAGEMENT | Facility: CLINIC | Age: 64
End: 2024-11-25
Payer: MEDICARE

## 2024-11-25 VITALS — WEIGHT: 156 LBS | HEIGHT: 64 IN | BODY MASS INDEX: 26.63 KG/M2

## 2024-11-25 DIAGNOSIS — M47.816 SPONDYLOSIS W/OUT MYELOPATHY OR RADICULOPATHY, LUMBAR REGION: ICD-10-CM

## 2024-11-25 PROCEDURE — 99214 OFFICE O/P EST MOD 30 MIN: CPT

## 2024-11-25 RX ORDER — METHOCARBAMOL 750 MG/1
750 TABLET, FILM COATED ORAL TWICE DAILY
Qty: 60 | Refills: 0 | Status: ACTIVE | COMMUNITY
Start: 2024-11-25 | End: 1900-01-01

## 2024-12-16 ENCOUNTER — APPOINTMENT (OUTPATIENT)
Dept: PAIN MANAGEMENT | Facility: CLINIC | Age: 64
End: 2024-12-16
Payer: MEDICARE

## 2024-12-16 DIAGNOSIS — M47.816 SPONDYLOSIS W/OUT MYELOPATHY OR RADICULOPATHY, LUMBAR REGION: ICD-10-CM

## 2024-12-16 PROCEDURE — 64494 INJ PARAVERT F JNT L/S 2 LEV: CPT | Mod: 50,59

## 2024-12-16 PROCEDURE — J3490M: CUSTOM

## 2024-12-16 PROCEDURE — 64493 INJ PARAVERT F JNT L/S 1 LEV: CPT | Mod: 50

## 2024-12-18 NOTE — H&P PST ADULT - PAIN RATING AT ACTIVITY
10 4 = No assist / stand by assistance Tremfya Counseling: I discussed with the patient the risks of guselkumab including but not limited to immunosuppression, serious infections, worsening of inflammatory bowel disease and drug reactions.  The patient understands that monitoring is required including a PPD at baseline and must alert us or the primary physician if symptoms of infection or other concerning signs are noted.

## 2024-12-28 NOTE — H&P ADULT - PROBLEM SELECTOR PLAN 6
Problem: Adult Inpatient Plan of Care  Goal: Plan of Care Review  Outcome: Progressing  Goal: Patient-Specific Goal (Individualized)  Outcome: Progressing  Goal: Absence of Hospital-Acquired Illness or Injury  Outcome: Progressing  Goal: Optimal Comfort and Wellbeing  Outcome: Progressing  Goal: Readiness for Transition of Care  Outcome: Progressing     Problem: Diabetes Comorbidity  Goal: Blood Glucose Level Within Targeted Range  Outcome: Progressing     Problem: Skin Injury Risk Increased  Goal: Skin Health and Integrity  Outcome: Progressing      Chronic back pain, scheduled for surgery  with Dr Larkin in Jan/2022 per neighbor  on trazodone 30mg IR q6hr, baclofen, tizanidine , celebrex  Hold medication given AMS  Resume with a lower dose once mental status improve and if patient complain of pain

## 2024-12-30 ENCOUNTER — APPOINTMENT (OUTPATIENT)
Dept: PAIN MANAGEMENT | Facility: CLINIC | Age: 64
End: 2024-12-30
Payer: MEDICARE

## 2024-12-30 VITALS — WEIGHT: 157 LBS | HEIGHT: 64 IN | BODY MASS INDEX: 26.8 KG/M2

## 2024-12-30 DIAGNOSIS — M54.16 RADICULOPATHY, LUMBAR REGION: ICD-10-CM

## 2024-12-30 DIAGNOSIS — M51.26 OTHER INTERVERTEBRAL DISC DISPLACEMENT, LUMBAR REGION: ICD-10-CM

## 2024-12-30 DIAGNOSIS — M54.17 RADICULOPATHY, LUMBOSACRAL REGION: ICD-10-CM

## 2024-12-30 PROCEDURE — 99214 OFFICE O/P EST MOD 30 MIN: CPT

## 2025-02-03 ENCOUNTER — APPOINTMENT (OUTPATIENT)
Dept: PAIN MANAGEMENT | Facility: CLINIC | Age: 65
End: 2025-02-03

## 2025-02-03 DIAGNOSIS — M47.816 SPONDYLOSIS W/OUT MYELOPATHY OR RADICULOPATHY, LUMBAR REGION: ICD-10-CM

## 2025-02-03 PROCEDURE — 64636Z: CUSTOM | Mod: 50

## 2025-02-03 PROCEDURE — 64635 DESTROY LUMB/SAC FACET JNT: CPT | Mod: 50

## 2025-02-03 PROCEDURE — J3490M: CUSTOM

## 2025-02-18 ENCOUNTER — APPOINTMENT (OUTPATIENT)
Dept: GASTROENTEROLOGY | Facility: CLINIC | Age: 65
End: 2025-02-18
Payer: MEDICARE

## 2025-02-18 PROCEDURE — 99202 OFFICE O/P NEW SF 15 MIN: CPT | Mod: 93

## 2025-02-24 ENCOUNTER — APPOINTMENT (OUTPATIENT)
Dept: PAIN MANAGEMENT | Facility: CLINIC | Age: 65
End: 2025-02-24

## 2025-02-24 VITALS — WEIGHT: 157 LBS | BODY MASS INDEX: 26.8 KG/M2 | HEIGHT: 64 IN

## 2025-02-24 DIAGNOSIS — M47.816 SPONDYLOSIS W/OUT MYELOPATHY OR RADICULOPATHY, LUMBAR REGION: ICD-10-CM

## 2025-02-24 DIAGNOSIS — M54.17 RADICULOPATHY, LUMBOSACRAL REGION: ICD-10-CM

## 2025-02-24 PROCEDURE — 99213 OFFICE O/P EST LOW 20 MIN: CPT | Mod: 25

## 2025-02-24 PROCEDURE — J3490M: CUSTOM

## 2025-02-24 PROCEDURE — 20552 NJX 1/MLT TRIGGER POINT 1/2: CPT

## 2025-04-06 PROBLEM — S86.811A RUPTURE OF RIGHT PATELLAR TENDON, INITIAL ENCOUNTER: Status: ACTIVE | Noted: 2025-04-06

## 2025-04-15 ENCOUNTER — APPOINTMENT (OUTPATIENT)
Dept: ORTHOPEDIC SURGERY | Facility: CLINIC | Age: 65
End: 2025-04-15
Payer: MEDICARE

## 2025-04-15 VITALS — HEIGHT: 64 IN | BODY MASS INDEX: 26.8 KG/M2 | WEIGHT: 157 LBS

## 2025-04-15 DIAGNOSIS — Z96.641 PRESENCE OF RIGHT ARTIFICIAL HIP JOINT: ICD-10-CM

## 2025-04-15 DIAGNOSIS — Z86.39 PERSONAL HISTORY OF OTHER ENDOCRINE, NUTRITIONAL AND METABOLIC DISEASE: ICD-10-CM

## 2025-04-15 DIAGNOSIS — Z87.39 PERSONAL HISTORY OF OTHER DISEASES OF THE MUSCULOSKELETAL SYSTEM AND CONNECTIVE TISSUE: ICD-10-CM

## 2025-04-15 PROCEDURE — 99214 OFFICE O/P EST MOD 30 MIN: CPT

## 2025-04-15 RX ORDER — HYALURONATE SODIUM 30 MG/2 ML
30 SYRINGE (ML) INTRAARTICULAR
Qty: 28 | Refills: 0 | Status: ACTIVE | COMMUNITY
Start: 2025-04-15 | End: 1900-01-01

## 2025-04-23 ENCOUNTER — APPOINTMENT (OUTPATIENT)
Dept: ORTHOPEDIC SURGERY | Facility: CLINIC | Age: 65
End: 2025-04-23

## 2025-04-24 ENCOUNTER — APPOINTMENT (OUTPATIENT)
Dept: ORTHOPEDIC SURGERY | Facility: CLINIC | Age: 65
End: 2025-04-24
Payer: MEDICARE

## 2025-04-24 VITALS — HEIGHT: 64 IN | BODY MASS INDEX: 26.8 KG/M2 | WEIGHT: 157 LBS

## 2025-04-24 PROCEDURE — 99213 OFFICE O/P EST LOW 20 MIN: CPT | Mod: 25

## 2025-04-24 PROCEDURE — 20611 DRAIN/INJ JOINT/BURSA W/US: CPT | Mod: RT

## 2025-05-01 ENCOUNTER — APPOINTMENT (OUTPATIENT)
Dept: ORTHOPEDIC SURGERY | Facility: CLINIC | Age: 65
End: 2025-05-01
Payer: MEDICARE

## 2025-05-01 VITALS — WEIGHT: 157 LBS | HEIGHT: 64 IN | BODY MASS INDEX: 26.8 KG/M2

## 2025-05-01 PROCEDURE — 20611 DRAIN/INJ JOINT/BURSA W/US: CPT | Mod: RT

## 2025-05-08 ENCOUNTER — APPOINTMENT (OUTPATIENT)
Dept: ORTHOPEDIC SURGERY | Facility: CLINIC | Age: 65
End: 2025-05-08
Payer: MEDICARE

## 2025-05-08 VITALS — WEIGHT: 147 LBS | HEIGHT: 64 IN | BODY MASS INDEX: 25.1 KG/M2

## 2025-05-08 PROCEDURE — 20611 DRAIN/INJ JOINT/BURSA W/US: CPT | Mod: RT

## 2025-05-09 ENCOUNTER — APPOINTMENT (OUTPATIENT)
Dept: ORTHOPEDIC SURGERY | Facility: CLINIC | Age: 65
End: 2025-05-09
Payer: MEDICARE

## 2025-05-09 VITALS — HEIGHT: 64 IN | WEIGHT: 147 LBS | BODY MASS INDEX: 25.1 KG/M2

## 2025-05-09 DIAGNOSIS — M12.811 OTHER SPECIFIC ARTHROPATHIES, NOT ELSEWHERE CLASSIFIED, RIGHT SHOULDER: ICD-10-CM

## 2025-05-09 DIAGNOSIS — M19.011 PRIMARY OSTEOARTHRITIS, RIGHT SHOULDER: ICD-10-CM

## 2025-05-09 PROCEDURE — 73010 X-RAY EXAM OF SHOULDER BLADE: CPT | Mod: RT

## 2025-05-09 PROCEDURE — 20611 DRAIN/INJ JOINT/BURSA W/US: CPT | Mod: RT

## 2025-05-09 PROCEDURE — 99214 OFFICE O/P EST MOD 30 MIN: CPT | Mod: 25

## 2025-05-09 PROCEDURE — J3490M: CUSTOM | Mod: JZ

## 2025-05-09 PROCEDURE — 73030 X-RAY EXAM OF SHOULDER: CPT | Mod: RT

## 2025-05-15 ENCOUNTER — APPOINTMENT (OUTPATIENT)
Dept: ORTHOPEDIC SURGERY | Facility: CLINIC | Age: 65
End: 2025-05-15
Payer: MEDICARE

## 2025-05-15 VITALS — WEIGHT: 147 LBS | HEIGHT: 64 IN | BODY MASS INDEX: 25.1 KG/M2

## 2025-05-15 DIAGNOSIS — M17.11 UNILATERAL PRIMARY OSTEOARTHRITIS, RIGHT KNEE: ICD-10-CM

## 2025-05-15 PROCEDURE — 20611 DRAIN/INJ JOINT/BURSA W/US: CPT | Mod: RT

## 2025-05-28 NOTE — BH CONSULTATION LIAISON PROGRESS NOTE - NSBHHPIREASONCLOTHER_PSY_A_CORE FT
Detail Level: Detailed
Detail Level: Simple
confusion 
Sarecycline Pregnancy And Lactation Text: This medication is Pregnancy Category D and not consider safe during pregnancy. It is also excreted in breast milk.
Benzoyl Peroxide Counseling: Patient counseled that medicine may cause skin irritation and bleach clothing.  In the event of skin irritation, the patient was advised to reduce the amount of the drug applied or use it less frequently.   The patient verbalized understanding of the proper use and possible adverse effects of benzoyl peroxide.  All of the patient's questions and concerns were addressed.
Isotretinoin Counseling: Patient should get monthly blood tests, not donate blood, not drive at night if vision affected, not share medication, and not undergo elective surgery for 6 months after tx completed. Side effects reviewed, pt to contact office should one occur.
Detail Level: Zone
Birth Control Pills Pregnancy And Lactation Text: This medication should be avoided if pregnant and for the first 30 days post-partum.
Spironolactone Pregnancy And Lactation Text: This medication can cause feminization of the male fetus and should be avoided during pregnancy. The active metabolite is also found in breast milk.
Topical Clindamycin Pregnancy And Lactation Text: This medication is Pregnancy Category B and is considered safe during pregnancy. It is unknown if it is excreted in breast milk.
Azelaic Acid Counseling: Patient counseled that medicine may cause skin irritation and to avoid applying near the eyes.  In the event of skin irritation, the patient was advised to reduce the amount of the drug applied or use it less frequently.   The patient verbalized understanding of the proper use and possible adverse effects of azelaic acid.  All of the patient's questions and concerns were addressed.
Bactrim Pregnancy And Lactation Text: This medication is Pregnancy Category D and is known to cause fetal risk.  It is also excreted in breast milk.
Doxycycline Counseling:  Patient counseled regarding possible photosensitivity and increased risk for sunburn.  Patient instructed to avoid sunlight, if possible.  When exposed to sunlight, patients should wear protective clothing, sunglasses, and sunscreen.  The patient was instructed to call the office immediately if the following severe adverse effects occur:  hearing changes, easy bruising/bleeding, severe headache, or vision changes.  The patient verbalized understanding of the proper use and possible adverse effects of doxycycline.  All of the patient's questions and concerns were addressed.
Azithromycin Pregnancy And Lactation Text: This medication is considered safe during pregnancy and is also secreted in breast milk.
Topical Retinoid Pregnancy And Lactation Text: This medication is Pregnancy Category C. It is unknown if this medication is excreted in breast milk.
Aklief counseling:  Patient advised to apply a pea-sized amount only at bedtime and wait 30 minutes after washing their face before applying.  If too drying, patient may add a non-comedogenic moisturizer.  The most commonly reported side effects including irritation, redness, scaling, dryness, stinging, burning, itching, and increased risk of sunburn.  The patient verbalized understanding of the proper use and possible adverse effects of retinoids.  All of the patient's questions and concerns were addressed.
Include Pregnancy/Lactation Warning?: Add Automatically Based on Childbearing Potential and Patient Age
Tazorac Pregnancy And Lactation Text: This medication is not safe during pregnancy. It is unknown if this medication is excreted in breast milk.
Erythromycin Counseling:  I discussed with the patient the risks of erythromycin including but not limited to GI upset, allergic reaction, drug rash, diarrhea, increase in liver enzymes, and yeast infections.
Winlevi Counseling:  I discussed with the patient the risks of topical clascoterone including but not limited to erythema, scaling, itching, and stinging. Patient voiced their understanding.
High Dose Vitamin A Pregnancy And Lactation Text: High dose vitamin A therapy is contraindicated during pregnancy and breast feeding.
Tetracycline Counseling: Patient counseled regarding possible photosensitivity and increased risk for sunburn.  Patient instructed to avoid sunlight, if possible.  When exposed to sunlight, patients should wear protective clothing, sunglasses, and sunscreen.  The patient was instructed to call the office immediately if the following severe adverse effects occur:  hearing changes, easy bruising/bleeding, severe headache, or vision changes.  The patient verbalized understanding of the proper use and possible adverse effects of tetracycline.  All of the patient's questions and concerns were addressed. Patient understands to avoid pregnancy while on therapy due to potential birth defects.
Birth Control Pills Counseling: Birth Control Pill Counseling: I discussed with the patient the potential side effects of OCPs including but not limited to increased risk of stroke, heart attack, thrombophlebitis, deep venous thrombosis, hepatic adenomas, breast changes, GI upset, headaches, and depression.  The patient verbalized understanding of the proper use and possible adverse effects of OCPs. All of the patient's questions and concerns were addressed.
Spironolactone Counseling: Patient advised regarding risks of diarrhea, abdominal pain, hyperkalemia, birth defects (for female patients), liver toxicity and renal toxicity. The patient may need blood work to monitor liver and kidney function and potassium levels while on therapy. The patient verbalized understanding of the proper use and possible adverse effects of spironolactone.  All of the patient's questions and concerns were addressed.
Azelaic Acid Pregnancy And Lactation Text: This medication is considered safe during pregnancy and breast feeding.
Dapsone Counseling: I discussed with the patient the risks of dapsone including but not limited to hemolytic anemia, agranulocytosis, rashes, methemoglobinemia, kidney failure, peripheral neuropathy, headaches, GI upset, and liver toxicity.  Patients who start dapsone require monitoring including baseline LFTs and weekly CBCs for the first month, then every month thereafter.  The patient verbalized understanding of the proper use and possible adverse effects of dapsone.  All of the patient's questions and concerns were addressed.
Isotretinoin Pregnancy And Lactation Text: This medication is Pregnancy Category X and is considered extremely dangerous during pregnancy. It is unknown if it is excreted in breast milk.
Benzoyl Peroxide Pregnancy And Lactation Text: This medication is Pregnancy Category C. It is unknown if benzoyl peroxide is excreted in breast milk.
Topical Sulfur Applications Counseling: Topical Sulfur Counseling: Patient counseled that this medication may cause skin irritation or allergic reactions.  In the event of skin irritation, the patient was advised to reduce the amount of the drug applied or use it less frequently.   The patient verbalized understanding of the proper use and possible adverse effects of topical sulfur application.  All of the patient's questions and concerns were addressed.
Topical Clindamycin Counseling: Patient counseled that this medication may cause skin irritation or allergic reactions.  In the event of skin irritation, the patient was advised to reduce the amount of the drug applied or use it less frequently.   The patient verbalized understanding of the proper use and possible adverse effects of clindamycin.  All of the patient's questions and concerns were addressed.
Erythromycin Pregnancy And Lactation Text: This medication is Pregnancy Category B and is considered safe during pregnancy. It is also excreted in breast milk.
Sarecycline Counseling: Patient advised regarding possible photosensitivity and discoloration of the teeth, skin, lips, tongue and gums.  Patient instructed to avoid sunlight, if possible.  When exposed to sunlight, patients should wear protective clothing, sunglasses, and sunscreen.  The patient was instructed to call the office immediately if the following severe adverse effects occur:  hearing changes, easy bruising/bleeding, severe headache, or vision changes.  The patient verbalized understanding of the proper use and possible adverse effects of sarecycline.  All of the patient's questions and concerns were addressed.
Azithromycin Counseling:  I discussed with the patient the risks of azithromycin including but not limited to GI upset, allergic reaction, drug rash, diarrhea, and yeast infections.
Minocycline Counseling: Patient advised regarding possible photosensitivity and discoloration of the teeth, skin, lips, tongue and gums.  Patient instructed to avoid sunlight, if possible.  When exposed to sunlight, patients should wear protective clothing, sunglasses, and sunscreen.  The patient was instructed to call the office immediately if the following severe adverse effects occur:  hearing changes, easy bruising/bleeding, severe headache, or vision changes.  The patient verbalized understanding of the proper use and possible adverse effects of minocycline.  All of the patient's questions and concerns were addressed.
Use Enhanced Medication Counseling?: No
Bactrim Counseling:  I discussed with the patient the risks of sulfa antibiotics including but not limited to GI upset, allergic reaction, drug rash, diarrhea, dizziness, photosensitivity, and yeast infections.  Rarely, more serious reactions can occur including but not limited to aplastic anemia, agranulocytosis, methemoglobinemia, blood dyscrasias, liver or kidney failure, lung infiltrates or desquamative/blistering drug rashes.
Tazorac Counseling:  Patient advised that medication is irritating and drying.  Patient may need to apply sparingly and wash off after an hour before eventually leaving it on overnight.  The patient verbalized understanding of the proper use and possible adverse effects of tazorac.  All of the patient's questions and concerns were addressed.
Aklief Pregnancy And Lactation Text: It is unknown if this medication is safe to use during pregnancy.  It is unknown if this medication is excreted in breast milk.  Breastfeeding women should use the topical cream on the smallest area of the skin for the shortest time needed while breastfeeding.  Do not apply to nipple and areola.
Doxycycline Pregnancy And Lactation Text: This medication is Pregnancy Category D and not consider safe during pregnancy. It is also excreted in breast milk but is considered safe for shorter treatment courses.
Topical Retinoid counseling:  Patient advised to apply a pea-sized amount only at bedtime and wait 30 minutes after washing their face before applying.  If too drying, patient may add a non-comedogenic moisturizer. The patient verbalized understanding of the proper use and possible adverse effects of retinoids.  All of the patient's questions and concerns were addressed.
Topical Sulfur Applications Pregnancy And Lactation Text: This medication is Pregnancy Category C and has an unknown safety profile during pregnancy. It is unknown if this topical medication is excreted in breast milk.
Winlevi Pregnancy And Lactation Text: This medication is considered safe during pregnancy and breastfeeding.
Dapsone Pregnancy And Lactation Text: This medication is Pregnancy Category C and is not considered safe during pregnancy or breast feeding.
High Dose Vitamin A Counseling: Side effects reviewed, pt to contact office should one occur.

## 2025-06-12 ENCOUNTER — OUTPATIENT (OUTPATIENT)
Dept: OUTPATIENT SERVICES | Facility: HOSPITAL | Age: 65
LOS: 1 days | End: 2025-06-12
Payer: MEDICARE

## 2025-06-12 ENCOUNTER — RESULT REVIEW (OUTPATIENT)
Age: 65
End: 2025-06-12

## 2025-06-12 ENCOUNTER — APPOINTMENT (OUTPATIENT)
Dept: GASTROENTEROLOGY | Facility: HOSPITAL | Age: 65
End: 2025-06-12

## 2025-06-12 ENCOUNTER — TRANSCRIPTION ENCOUNTER (OUTPATIENT)
Age: 65
End: 2025-06-12

## 2025-06-12 VITALS
WEIGHT: 154.1 LBS | SYSTOLIC BLOOD PRESSURE: 137 MMHG | HEIGHT: 64 IN | TEMPERATURE: 97 F | RESPIRATION RATE: 14 BRPM | OXYGEN SATURATION: 100 % | DIASTOLIC BLOOD PRESSURE: 73 MMHG | HEART RATE: 61 BPM

## 2025-06-12 VITALS
SYSTOLIC BLOOD PRESSURE: 118 MMHG | RESPIRATION RATE: 20 BRPM | DIASTOLIC BLOOD PRESSURE: 68 MMHG | OXYGEN SATURATION: 100 % | HEART RATE: 59 BPM

## 2025-06-12 DIAGNOSIS — Z98.890 OTHER SPECIFIED POSTPROCEDURAL STATES: Chronic | ICD-10-CM

## 2025-06-12 DIAGNOSIS — Z86.79 PERSONAL HISTORY OF OTHER DISEASES OF THE CIRCULATORY SYSTEM: Chronic | ICD-10-CM

## 2025-06-12 DIAGNOSIS — K83.8 OTHER SPECIFIED DISEASES OF BILIARY TRACT: ICD-10-CM

## 2025-06-12 PROCEDURE — 43239 EGD BIOPSY SINGLE/MULTIPLE: CPT

## 2025-06-12 PROCEDURE — 43259 EGD US EXAM DUODENUM/JEJUNUM: CPT

## 2025-06-12 PROCEDURE — 88305 TISSUE EXAM BY PATHOLOGIST: CPT | Mod: 26

## 2025-06-12 PROCEDURE — 43237 ENDOSCOPIC US EXAM ESOPH: CPT

## 2025-06-12 PROCEDURE — 88305 TISSUE EXAM BY PATHOLOGIST: CPT

## 2025-06-12 RX ORDER — CLONAZEPAM 0.5 MG/1
1 TABLET ORAL
Refills: 0 | DISCHARGE

## 2025-06-12 RX ORDER — METHOCARBAMOL 500 MG/1
2 TABLET, FILM COATED ORAL
Refills: 0 | DISCHARGE

## 2025-06-12 RX ORDER — CELECOXIB 50 MG/1
1 CAPSULE ORAL
Refills: 0 | DISCHARGE

## 2025-06-12 NOTE — PRE-ANESTHESIA EVALUATION ADULT - NSDENTALSD_ENT_ALL_CORE
Time: 6:09 PM EST  Date of encounter:  11/20/2023  Independent Historian/Clinical History and Information was obtained by:   Patient    History is limited by: N/A    Chief Complaint   Patient presents with    Leg Injury     Hit left lower leg on Monday and now wound is moist swollen and red         History of Present Illness:  Patient is a 43 y.o. year old male who presents to the emergency department for evaluation of left leg wound. Patient reports that he hit his left lower leg on a bench at work last Monday and scraped it, now is red and swollen.  Denies fever.  Has been using peroxide to clean it daily.  Numbness weakness or tingling (TOM Begum, Provider in Triage).      Patient Care Team  Primary Care Provider: Anne Elliott APRN    Past Medical History:     No Known Allergies  Past Medical History:   Diagnosis Date    Asthma     Bipolar disorder     Diabetes mellitus     GERD (gastroesophageal reflux disease)      Past Surgical History:   Procedure Laterality Date    CIRCUMCISION N/A 6/21/2021    Procedure: CIRCUMCISION and excision of sebaceous cyst penis;  Surgeon: Mireille Magallon MD;  Location: [facility] OR;  Service: Urology;  Laterality: N/A;    LIVER SURGERY      PT STATES HE HAD LIVER SURGERY FOR REPAIR      Family History   Problem Relation Age of Onset    Cancer Father     Diabetes Father     Diabetes Brother     Heart disease Brother        Home Medications:  Prior to Admission medications    Medication Sig Start Date End Date Taking? Authorizing Provider   amitriptyline (ELAVIL) 100 MG tablet Take 1 tablet by mouth Every Night.    Enmanuel Noe MD   budesonide-formoterol (SYMBICORT) 80-4.5 MCG/ACT inhaler Inhale 2 puffs 2 (Two) Times a Day.    Enmanuel Noe MD   glyBURIDE-metFORMIN (GLUCOVANCE) 5-500 MG per tablet Take 1 tablet by mouth 2 (Two) Times a Day. 6/2/21   Enmanuel Noe MD   Insulin Aspart (novoLOG) 100 UNIT/ML injection Inject 20 Units under  "the skin into the appropriate area as directed Daily.    Enmanuel Noe MD   insulin detemir (LEVEMIR) 100 UNIT/ML injection Inject 30-40 Units under the skin into the appropriate area as directed Daily As Needed.    Enmanuel Noe MD   OLANZapine (zyPREXA) 20 MG tablet Take 1 tablet by mouth Daily. 6/1/21   Enmanuel Noe MD   omeprazole (priLOSEC) 40 MG capsule Take 1 capsule by mouth Daily. 8/2/23   Enmanuel Noe MD   pravastatin (PRAVACHOL) 20 MG tablet Take 1 tablet by mouth Daily. 8/23/23   Enmanuel Noe MD        Social History:   Social History     Tobacco Use    Smoking status: Every Day     Packs/day: 1.00     Years: 16.00     Additional pack years: 0.00     Total pack years: 16.00     Types: Cigarettes    Smokeless tobacco: Former     Types: Chew   Vaping Use    Vaping Use: Never used   Substance Use Topics    Alcohol use: Not Currently    Drug use: Not Currently         Review of Systems:  Review of Systems   Constitutional:  Negative for fever.   Gastrointestinal:  Negative for vomiting.   Musculoskeletal:  Negative for back pain, gait problem and joint swelling.   Skin:  Positive for color change (redness around wound) and wound.   Neurological:  Negative for weakness and numbness.   All other systems reviewed and are negative.       Physical Exam:  /84 (BP Location: Right arm, Patient Position: Lying)   Pulse (!) 125   Temp 98.5 °F (36.9 °C) (Oral)   Resp 18   Ht 167.6 cm (66\")   Wt 72.3 kg (159 lb 6.3 oz)   SpO2 99%   BMI 25.73 kg/m²         Physical Exam  Vitals and nursing note reviewed.   Constitutional:       General: He is not in acute distress.     Appearance: Normal appearance. He is not toxic-appearing.   HENT:      Head: Normocephalic and atraumatic.      Nose: Nose normal.      Mouth/Throat:      Mouth: Mucous membranes are moist.   Eyes:      General: No scleral icterus.     Conjunctiva/sclera: Conjunctivae normal.   Cardiovascular:      " Rate and Rhythm: Normal rate and regular rhythm.      Pulses: Normal pulses.      Heart sounds: Normal heart sounds.   Pulmonary:      Effort: Pulmonary effort is normal. No respiratory distress.      Breath sounds: Normal breath sounds.   Abdominal:      General: There is no distension.      Tenderness: There is no abdominal tenderness.   Musculoskeletal:         General: Tenderness (Soft tissue tenderness right lower leg medial aspect at site of abrasion) present. No swelling. Normal range of motion.      Cervical back: Normal range of motion and neck supple.   Skin:     General: Skin is warm and dry.      Coloration: Skin is not cyanotic.      Findings: Erythema (Light erythema immediately surrounding abrasion.  Wound is scabbed.  No purulent discharge.  No lymphangitis streaking no abscess) and wound present.      Comments: Wound noted on medial left lower leg.   Neurological:      Mental Status: He is alert and oriented to person, place, and time.      Sensory: No sensory deficit.      Motor: No weakness.   Psychiatric:         Attention and Perception: Attention and perception normal.         Mood and Affect: Mood normal.         Behavior: Behavior normal.              Medical Decision Making:      Comorbidities that affect care:    Bipolar disorder, Asthma, Diabetes, Smoking, Substance Abuse    External Notes reviewed:    Previous Admission Note: Patient last admitted in October on the 27th for hyperglycemia and small bowel obstruction      The following orders were placed and all results were independently analyzed by me:  Orders Placed This Encounter   Procedures    Comprehensive Metabolic Panel    CBC Auto Differential    CBC & Differential       Medications Given in the Emergency Department:  Medications   Tetanus-Diphth-Acell Pertussis (BOOSTRIX) injection 0.5 mL (has no administration in time range)   mupirocin (BACTROBAN) 2 % ointment 1 application  (has no administration in time range)   cephalexin  (KEFLEX) capsule 500 mg (has no administration in time range)        ED Course:    The patient was initially evaluated in the triage area where orders were placed. The patient was later dispositioned by TOM Nesbitt.      The patient was advised to stay for completion of workup which includes but is not limited to communication of labs and radiological results, reassessment and plan. The patient was advised that leaving prior to disposition by a provider could result in critical findings that are not communicated to the patient.     ED Course as of 11/20/23 1937 Mon Nov 20, 2023 1813 PROVIDER IN TRIAGE  Patient was evaluated by me in triage, Brenda SANTOS.  Orders were placed and patient is currently awaiting final results and disposition.  [CT]      ED Course User Index  [CT] Brenda Loya APRN       Labs:    Lab Results (last 24 hours)       Procedure Component Value Units Date/Time    CBC & Differential [[id number]]  (Abnormal) Collected: 11/20/23 1818    Specimen: Blood Updated: 11/20/23 1903    Narrative:      The following orders were created for panel order CBC & Differential.  Procedure                               Abnormality         Status                     ---------                               -----------         ------                     CBC Auto Differential[[id number]]        Abnormal            Final result                 Please view results for these tests on the individual orders.    Comprehensive Metabolic Panel [[id number]]  (Abnormal) Collected: 11/20/23 1818    Specimen: Blood Updated: 11/20/23 1844     Glucose 314 mg/dL      BUN 9 mg/dL      Creatinine 0.98 mg/dL      Sodium 137 mmol/L      Potassium 3.8 mmol/L      Chloride 95 mmol/L      CO2 28.7 mmol/L      Calcium 9.9 mg/dL      Total Protein 9.6 g/dL      Albumin 4.6 g/dL      ALT (SGPT) 12 U/L      AST (SGOT) 13 U/L      Alkaline Phosphatase 104 U/L      Total Bilirubin 0.9 mg/dL      Globulin 5.0 gm/dL      A/G  Ratio 0.9 g/dL      BUN/Creatinine Ratio 9.2     Anion Gap 13.3 mmol/L      eGFR 98.7 mL/min/1.73     Narrative:      GFR Normal >60  Chronic Kidney Disease <60  Kidney Failure <15      CBC Auto Differential [[id number]]  (Abnormal) Collected: 11/20/23 1818    Specimen: Blood Updated: 11/20/23 1903     WBC 5.91 10*3/mm3      RBC 4.91 10*6/mm3      Hemoglobin 13.9 g/dL      Hematocrit 39.5 %      MCV 80.4 fL      MCH 28.3 pg      MCHC 35.2 g/dL      RDW 13.2 %      RDW-SD 38.2 fl      MPV 9.0 fL      Platelets 227 10*3/mm3      Neutrophil % 28.6 %      Lymphocyte % 55.8 %      Monocyte % 12.5 %      Eosinophil % 2.4 %      Basophil % 0.5 %      Immature Grans % 0.2 %      Neutrophils, Absolute 1.69 10*3/mm3      Lymphocytes, Absolute 3.30 10*3/mm3      Monocytes, Absolute 0.74 10*3/mm3      Eosinophils, Absolute 0.14 10*3/mm3      Basophils, Absolute 0.03 10*3/mm3      Immature Grans, Absolute 0.01 10*3/mm3      nRBC 0.0 /100 WBC              Imaging:    No Radiology Exams Resulted Within Past 24 Hours      Differential Diagnosis and Discussion:      Extremity Pain: Differential diagnosis includes but is not limited to soft tissue sprain, tendonitis, tendon injury, dislocation, fracture, deep vein thrombosis, arterial insufficiency, osteoarthritis, bursitis, and ligamentous damage.    All labs were reviewed and interpreted by me.    MDM  Number of Diagnoses or Management Options  Abrasion of left lower leg with infection, initial encounter  Diagnosis management comments: The patient has erythema and pain consistent with cellulitis. The area of erythema was demarcated in the ED. The patient has no fluctuance or induration consistent with abscess formation. The patient was started on antibiotics in the Emergency Department. The patient is resting comfortably and feels better, is alert and in no distress. On re-examination the patient does not appear toxic and has no meningeal signs, and there's no intractable vomiting  or respiratory distress. Based on the history, exam, diagnostic testing and reassessment, the patient has no signs of sepsis.  The patient's vital signs have been stable. The patient's condition is stable and is appropriate for discharge. The patient will pursue further outpatient evaluation with the primary care physician or other designated or consultant physician as indicated in the discharge instructions. The patient was counseled that a repeat examination within two days is imperative. This can be done as an outpatient. Patient advised to return to the ED if outpatient evaluation is not feasible. The patient was counseled to return to the ER sooner for worsening of erythema or spread outside of the demarcated lines. The patient was also counseled to return for worsening fever, chills, altered mental status, intractable vomiting or any other symptoms of concern.       Amount and/or Complexity of Data Reviewed  Clinical lab tests: reviewed and ordered  Tests in the medicine section of CPT®: reviewed and ordered    Risk of Complications, Morbidity, and/or Mortality  Presenting problems: low  Diagnostic procedures: low  Management options: low    Patient Progress  Patient progress: stable         Patient Care Considerations:    SEPSIS was considered but is NOT present in the emergency department as SIRS criteria is not present.      Consultants/Shared Management Plan:    None    Social Determinants of Health:    Patient is independent, reliable, and has access to care.       Disposition and Care Coordination:    Discharged: The patient is suitable and stable for discharge with no need for consideration of observation or admission.    I have explained the patient´s condition, diagnoses and treatment plan based on the information available to me at this time. I have answered questions and addressed any concerns. The patient has a good  understanding of the patient´s diagnosis, condition, and treatment plan as can be  expected at this point. The vital signs have been stable. The patient´s condition is stable and appropriate for discharge from the emergency department.      The patient will pursue further outpatient evaluation with the primary care physician or other designated or consulting physician as outlined in the discharge instructions. They are agreeable to this plan of care and follow-up instructions have been explained in detail. The patient has received these instructions in written format and have expressed an understanding of the discharge instructions. The patient is aware that any significant change in condition or worsening of symptoms should prompt an immediate return to this or the closest emergency department or call to 911.  I have explained discharge medications and the need for follow up with the patient/caretakers. This was also printed in the discharge instructions. Patient was discharged with the following medications and follow up:      Medication List        New Prescriptions      cephalexin 500 MG capsule  Commonly known as: KEFLEX  Take 1 capsule by mouth 4 (Four) Times a Day for 7 days.     mupirocin 2 % ointment  Commonly known as: BACTROBAN  Apply 1 application  topically to the appropriate area as directed 3 (Three) Times a Day.               Where to Get Your Medications        These medications were sent to [id number] - [place], [place] - [address]  AT [facility] [address] - [phone] PH - [phone] FX  [address] [facility] [address]      Phone: [phone]   cephalexin 500 MG capsule  mupirocin 2 % ointment      Anne Elliott, APRN  [address]  [address]  [facility] [address]  [phone]    In 3 days  For wound re-check       Final diagnoses:   Abrasion of left lower leg with infection, initial encounter        ED Disposition       ED Disposition   Discharge    Condition   Stable    Comment   --               This medical record created  using voice recognition software.             Lorena Gamble APRN  11/20/23 1936       Lorena Gamble APRN  11/20/23 1937     appears normal and intact

## 2025-06-12 NOTE — ASU DISCHARGE PLAN (ADULT/PEDIATRIC) - FINANCIAL ASSISTANCE
[facility] provides services at a reduced cost to those who are determined to be eligible through [facility]’s financial assistance program. Information regarding [facility]’s financial assistance program can be found by going to [email or web address] or by calling [phone].

## 2025-06-12 NOTE — PRE PROCEDURE NOTE - PRE PROCEDURE EVALUATION
Attending Physician:  Tani                  Procedure: EUS    Indication for Procedure: dilated CBD  ________________________________________________________  PAST MEDICAL & SURGICAL HISTORY:  Bipolar disorder      Hypothyroidism      Chronic radicular low back pain      Arthritis of right hip      Migraines      History of Castro's esophagus      Osteoarthritis  right hip,      Arthritis      COVID-19  fatigue, fever, never hospitalized      History of hysteroscopy  s/p uterine ablation      H/O subarachnoid hemorrhage  1989: geri clip: no MRI's      History of lumbar puncture  1989, 1990, 1991, 1992      H/O colonoscopy  2018      S/P endoscopy  2018        ALLERGIES:  No Known Allergies    HOME MEDICATIONS:  baclofen 20 mg oral tablet: 1 tab(s) orally 2 times a day  Cymbalta 60 mg oral delayed release capsule: 1 cap(s) orally once a day (at bedtime)  magnesium oxide 500 mg oral tablet: 1 tab(s) orally once a day  oxyCODONE 30 mg oral tablet: 1 tab(s) orally every 4 hours, As needed, pain 4-10  Probiotic Formula oral capsule: 1 cap(s) orally once a day  sucralfate 1 g oral tablet: 1 tab(s) orally once a day  traZODone: 75 milligram(s) orally once a day (at bedtime)    AICD/PPM: [ ] yes   [x ] no    PERTINENT LAB DATA:                      PHYSICAL EXAMINATION:    vitals wnl    Constitutional: NAD  Neck:  supple  Respiratory: no respiratory stress, no audible wheezes  Cardiovascular: RR  Gastrointestinal: soft, NT/ND  Extremities: No peripheral edema  Neurological: Alert, no focal deficits  Psychiatric: Normal mood, normal affect  Skin: No rashes      COMMENTS:    The patient is a suitable candidate for the planned procedure unless box checked [ ]  No, explain:

## 2025-06-12 NOTE — ASU PATIENT PROFILE, ADULT - FALL HARM RISK - HARM RISK INTERVENTIONS

## 2025-06-12 NOTE — ASU PREOP CHECKLIST - ASSESSMENT, HISTORY & PHYSICAL COMPLETED AND ON MEDICAL RECORD
C/o left upper arm pain and left eyebrow laceration. Patient reports tripping on a rug at [employer] approximately 30 minutes ago. Denies loc. Positive left radial pulse noted.  
done

## 2025-06-16 LAB — SURGICAL PATHOLOGY STUDY: SIGNIFICANT CHANGE UP

## 2025-06-18 ENCOUNTER — NON-APPOINTMENT (OUTPATIENT)
Age: 65
End: 2025-06-18

## 2025-06-20 ENCOUNTER — APPOINTMENT (OUTPATIENT)
Dept: ORTHOPEDIC SURGERY | Facility: CLINIC | Age: 65
End: 2025-06-20
Payer: MEDICARE

## 2025-06-20 PROBLEM — S70.01XA: Status: ACTIVE | Noted: 2025-06-20

## 2025-06-20 PROCEDURE — 99214 OFFICE O/P EST MOD 30 MIN: CPT

## 2025-06-20 PROCEDURE — 73502 X-RAY EXAM HIP UNI 2-3 VIEWS: CPT

## 2025-07-18 ENCOUNTER — APPOINTMENT (OUTPATIENT)
Dept: ORTHOPEDIC SURGERY | Facility: CLINIC | Age: 65
End: 2025-07-18
Payer: MEDICARE

## 2025-07-18 PROCEDURE — 73010 X-RAY EXAM OF SHOULDER BLADE: CPT | Mod: RT

## 2025-07-18 PROCEDURE — 73030 X-RAY EXAM OF SHOULDER: CPT | Mod: RT

## 2025-07-18 PROCEDURE — 99213 OFFICE O/P EST LOW 20 MIN: CPT | Mod: 25

## 2025-07-18 PROCEDURE — 20611 DRAIN/INJ JOINT/BURSA W/US: CPT | Mod: RT

## 2025-07-25 ENCOUNTER — APPOINTMENT (OUTPATIENT)
Dept: ORTHOPEDIC SURGERY | Facility: CLINIC | Age: 65
End: 2025-07-25
Payer: MEDICARE

## 2025-07-25 PROCEDURE — 20611 DRAIN/INJ JOINT/BURSA W/US: CPT | Mod: RT

## 2025-08-01 ENCOUNTER — APPOINTMENT (OUTPATIENT)
Dept: ORTHOPEDIC SURGERY | Facility: CLINIC | Age: 65
End: 2025-08-01
Payer: MEDICARE

## 2025-08-01 VITALS — HEIGHT: 64 IN | WEIGHT: 147 LBS | BODY MASS INDEX: 25.1 KG/M2

## 2025-08-01 PROCEDURE — 20610 DRAIN/INJ JOINT/BURSA W/O US: CPT | Mod: RT

## 2025-08-08 ENCOUNTER — APPOINTMENT (OUTPATIENT)
Dept: ORTHOPEDIC SURGERY | Facility: CLINIC | Age: 65
End: 2025-08-08

## 2025-08-08 DIAGNOSIS — M12.811 OTHER SPECIFIC ARTHROPATHIES, NOT ELSEWHERE CLASSIFIED, RIGHT SHOULDER: ICD-10-CM

## 2025-08-08 DIAGNOSIS — M19.011 PRIMARY OSTEOARTHRITIS, RIGHT SHOULDER: ICD-10-CM

## 2025-08-08 PROCEDURE — 20611 DRAIN/INJ JOINT/BURSA W/US: CPT | Mod: RT

## (undated) DEVICE — FOLEY HOLDER STATLOCK 2 WAY ADULT

## (undated) DEVICE — SUCTION YANKAUER NO CONTROL VENT

## (undated) DEVICE — SYR ALLIANCE II INFLATION 60ML

## (undated) DEVICE — BALLOON US ENDO

## (undated) DEVICE — TUBING SUCTION 20FT

## (undated) DEVICE — TUBING IV SET GRAVITY 3Y 100" MACRO

## (undated) DEVICE — PACK IV START WITH CHG

## (undated) DEVICE — FRA-ESU BOVIE FORCE TRIAD T0E42286E: Type: DURABLE MEDICAL EQUIPMENT

## (undated) DEVICE — CATH IV SAFE BC 20G X 1.16" (PINK)

## (undated) DEVICE — SOL INJ NS 0.9% 500ML 2 PORT

## (undated) DEVICE — SENSOR O2 FINGER ADULT

## (undated) DEVICE — DRSG PICO NPWT 4X12"

## (undated) DEVICE — CATH IV SAFE BC 22G X 1" (BLUE)

## (undated) DEVICE — BITE BLOCK ADULT 20 X 27MM (GREEN)

## (undated) DEVICE — TUBING SUCTION CONN 6FT STERILE

## (undated) DEVICE — BIOPSY FORCEP RADIAL JAW 4 STANDARD WITH NEEDLE